# Patient Record
Sex: FEMALE | Race: WHITE | NOT HISPANIC OR LATINO | Employment: OTHER | URBAN - METROPOLITAN AREA
[De-identification: names, ages, dates, MRNs, and addresses within clinical notes are randomized per-mention and may not be internally consistent; named-entity substitution may affect disease eponyms.]

---

## 2021-04-26 ENCOUNTER — MEDICAL CORRESPONDENCE (OUTPATIENT)
Dept: HEALTH INFORMATION MANAGEMENT | Facility: CLINIC | Age: 71
End: 2021-04-26

## 2021-04-26 ENCOUNTER — TRANSFERRED RECORDS (OUTPATIENT)
Dept: HEALTH INFORMATION MANAGEMENT | Facility: CLINIC | Age: 71
End: 2021-04-26

## 2021-04-28 ENCOUNTER — TRANSCRIBE ORDERS (OUTPATIENT)
Dept: OTHER | Age: 71
End: 2021-04-28

## 2021-04-28 DIAGNOSIS — M62.81 GENERALIZED MUSCLE WEAKNESS: Primary | ICD-10-CM

## 2021-04-28 DIAGNOSIS — M33.20 POLYMYOSITIS (H): ICD-10-CM

## 2021-04-30 NOTE — TELEPHONE ENCOUNTER
RECORDS RECEIVED FROM: External   Date of Appt: 7/15/21   NOTES (FOR ALL VISITS) STATUS DETAILS   OFFICE NOTE from referring provider Care Everywhere Dr Rama Bradley @ [facility]:  4/27/21   OFFICE NOTE from other specialist Care Everywhere Dr Mackenzie De La Cruz @ [facility] Neurology:  3/10/21   DISCHARGE SUMMARY from hospital N/A    DISCHARGE REPORT from the ER N/A    OPERATIVE REPORT N/A    MEDICATION LIST Care Everywhere    IMAGING  (FOR ALL VISITS)     EMG Care Everywhere [facility]:  3/30/21   EEG N/A    LUMBAR PUNCTURE N/A    ALETHEA SCAN N/A    ULTRASOUND (CAROTID BILAT) *VASCULAR* N/A    MRI (HEAD, NECK, SPINE) Received [facility]:  MRI Thight Right 4/22/21     CT (HEAD, NECK, SPINE) N/A       Action 4/30/21 MV 1.42pm   Action Taken Imaging request faxed to [facility] for:  MRI Thigh R 4/22/21     Action 5/3/21 MV 6.39am   Action Taken Images received from [facility] and resolved in PACS

## 2021-05-03 ENCOUNTER — OFFICE VISIT (OUTPATIENT)
Dept: NEUROLOGY | Facility: CLINIC | Age: 71
End: 2021-05-03
Attending: INTERNAL MEDICINE
Payer: COMMERCIAL

## 2021-05-03 ENCOUNTER — OFFICE VISIT (OUTPATIENT)
Dept: NEUROLOGY | Facility: CLINIC | Age: 71
End: 2021-05-03
Payer: COMMERCIAL

## 2021-05-03 VITALS
DIASTOLIC BLOOD PRESSURE: 88 MMHG | OXYGEN SATURATION: 95 % | SYSTOLIC BLOOD PRESSURE: 132 MMHG | HEART RATE: 107 BPM | RESPIRATION RATE: 16 BRPM

## 2021-05-03 DIAGNOSIS — M33.20 POLYMYOSITIS (H): ICD-10-CM

## 2021-05-03 DIAGNOSIS — M33.20 POLYMYOSITIS (H): Primary | ICD-10-CM

## 2021-05-03 DIAGNOSIS — R74.8 HYPERCKEMIA: ICD-10-CM

## 2021-05-03 DIAGNOSIS — J96.10 CHRONIC RESPIRATORY FAILURE, UNSPECIFIED WHETHER WITH HYPOXIA OR HYPERCAPNIA (H): ICD-10-CM

## 2021-05-03 DIAGNOSIS — K29.70 GASTRITIS WITHOUT BLEEDING, UNSPECIFIED CHRONICITY, UNSPECIFIED GASTRITIS TYPE: ICD-10-CM

## 2021-05-03 DIAGNOSIS — Z29.89 NEED FOR PNEUMOCYSTIS PROPHYLAXIS: ICD-10-CM

## 2021-05-03 LAB
MISCELLANEOUS TEST: NORMAL
MISCELLANEOUS TEST: NORMAL

## 2021-05-03 PROCEDURE — 95887 MUSC TST DONE W/N TST NONEXT: CPT | Performed by: PSYCHIATRY & NEUROLOGY

## 2021-05-03 PROCEDURE — 83516 IMMUNOASSAY NONANTIBODY: CPT | Mod: 90 | Performed by: PATHOLOGY

## 2021-05-03 PROCEDURE — 95885 MUSC TST DONE W/NERV TST LIM: CPT | Mod: 59 | Performed by: PSYCHIATRY & NEUROLOGY

## 2021-05-03 PROCEDURE — 82657 ENZYME CELL ACTIVITY: CPT | Mod: 90 | Performed by: PATHOLOGY

## 2021-05-03 PROCEDURE — 86235 NUCLEAR ANTIGEN ANTIBODY: CPT | Mod: 90 | Performed by: PATHOLOGY

## 2021-05-03 PROCEDURE — 95886 MUSC TEST DONE W/N TEST COMP: CPT | Performed by: PSYCHIATRY & NEUROLOGY

## 2021-05-03 PROCEDURE — 36415 COLL VENOUS BLD VENIPUNCTURE: CPT | Performed by: PATHOLOGY

## 2021-05-03 PROCEDURE — 99204 OFFICE O/P NEW MOD 45 MIN: CPT | Mod: 25 | Performed by: PSYCHIATRY & NEUROLOGY

## 2021-05-03 PROCEDURE — 95910 NRV CNDJ TEST 7-8 STUDIES: CPT | Performed by: PSYCHIATRY & NEUROLOGY

## 2021-05-03 RX ORDER — CYCLOBENZAPRINE HCL 10 MG
5 TABLET ORAL
COMMUNITY
Start: 2020-07-13 | End: 2023-09-22

## 2021-05-03 RX ORDER — PRIMIDONE 50 MG/1
50 TABLET ORAL 2 TIMES DAILY
COMMUNITY
Start: 2021-04-05 | End: 2024-04-01

## 2021-05-03 RX ORDER — ALBUTEROL SULFATE 0.83 MG/ML
SOLUTION RESPIRATORY (INHALATION)
COMMUNITY
Start: 2020-05-28 | End: 2023-09-22

## 2021-05-03 RX ORDER — RIVAROXABAN 20 MG/1
TABLET, FILM COATED ORAL
COMMUNITY
Start: 2021-01-26 | End: 2023-09-22

## 2021-05-03 RX ORDER — FLUTICASONE PROPIONATE 50 MCG
1 SPRAY, SUSPENSION (ML) NASAL DAILY PRN
COMMUNITY
Start: 2020-01-10

## 2021-05-03 RX ORDER — ATENOLOL 50 MG/1
50 TABLET ORAL DAILY
COMMUNITY
Start: 2020-06-10 | End: 2023-09-22

## 2021-05-03 RX ORDER — ASPIRIN 81 MG/1
81 TABLET, CHEWABLE ORAL EVERY EVENING
COMMUNITY

## 2021-05-03 RX ORDER — PROPRANOLOL HCL 60 MG
60 CAPSULE, EXTENDED RELEASE 24HR ORAL
COMMUNITY
Start: 2020-09-18 | End: 2023-09-22

## 2021-05-03 RX ORDER — LORATADINE 10 MG/1
10 TABLET ORAL DAILY
COMMUNITY

## 2021-05-03 RX ORDER — PREDNISONE 20 MG/1
TABLET ORAL
COMMUNITY
Start: 2020-11-03 | End: 2023-09-22

## 2021-05-03 RX ORDER — IBUPROFEN 600 MG/1
600 TABLET, FILM COATED ORAL EVERY 8 HOURS PRN
COMMUNITY
Start: 2021-01-21

## 2021-05-03 RX ORDER — ATORVASTATIN CALCIUM 20 MG/1
20 TABLET, FILM COATED ORAL DAILY
COMMUNITY
Start: 2021-02-15 | End: 2023-09-22

## 2021-05-03 RX ORDER — AMLODIPINE BESYLATE 10 MG/1
10 TABLET ORAL DAILY
COMMUNITY
Start: 2021-04-05

## 2021-05-03 RX ORDER — IPRATROPIUM BROMIDE AND ALBUTEROL SULFATE 2.5; .5 MG/3ML; MG/3ML
3 SOLUTION RESPIRATORY (INHALATION) EVERY 4 HOURS PRN
COMMUNITY
Start: 2020-09-12

## 2021-05-03 RX ORDER — FLUOCINONIDE TOPICAL SOLUTION USP, 0.05% 0.5 MG/ML
SOLUTION TOPICAL DAILY PRN
COMMUNITY
Start: 2020-12-14

## 2021-05-03 NOTE — PROGRESS NOTES
[facility]  Electrodiagnostic Laboratory    Nerve Conduction & EMG Report          Patient:       Jenna Jhaveri  Patient ID:    [id number]  Gender:        Female  YOB: 1950  Age:           71 Years 3 Months        History & Examination:  71 year old woman with weakness in upper and lower limbs, progressive since fall . Evaluate for myopathy.     Techniques: Motor and sensory conduction studies were done with surface recording electrodes. EMG was done with a concentric needle electrode.      Results:  Nerve conduction studies:  1. Left median-D2, ulnar-D5, sural and SP sensory responses are normal.   2. Left peroneal-EDB and tibial-AH motor responses show normal DL and mildly reduced amplitudes.   3. Left median-APB and ulnar-ADM motor responses are normal.     Needle EM. Fibrillation potentials and positive sharp waves were seen in the left deltoid, triceps, biceps, PT, VL, TA, gastrocnemius, and thoracic paraspinal muscles. Pseudomyotonic discharges were also seen in the left PT muscle.   2. Motor unit potentials with short amplitudes and increased polyphasia were seen in the left biceps muscle.   3. Motor unit potentials with a mixture of normal and short amplitudes with increased polyphasia were seen in the left deltoid and triceps muscles.  4. Motor unit potentials with increased polyphasia were seen in the left PT, VL, and TA muscles.  5. Early recruitment was seen in the left deltoid, biceps, triceps, and PT muscles.     Interpretation:  This is an abnormal study. 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 Clinical correlation is recommended.       Deven Beckford MD  Department of Neurology        Sensory NCS      Nerve / Sites Rec. Site Onset Peak NP Amp Ref. PP Amp Dist Boo Ref. Temp     ms ms  V  V  V cm m/s m/s  C   L MEDIAN - Dig II Anti      Wrist Dig II 2.76 3.75 14.2 10.0 28.0  14 50.7 48.0 32.8   L ULNAR - Dig V Anti      Wrist Dig V 1.93 2.55 29.2 8.0 45.3 12.5 64.9 48.0 32.8   L SURAL - Lat Mall 60      Calf Ankle 2.29 2.97 6.1 5.0 5.8 14 61.1 38.0    L SUP PERONEAL      Lat Leg Sanches 2.76 3.65 11.3  5.8 12.5 45.3 38.0        Motor NCS      Nerve / Sites Rec. Site Lat Ref. Amp Ref. Rel Amp Dist Boo Ref. Dur. Area Temp.     ms ms mV mV % cm m/s m/s ms %  C   L MEDIAN - APB      Wrist APB 4.01 4.40 5.9 5.0 100 8   6.61 100 32.8      Elbow APB 7.86  5.1  87.2 20 51.9 48.0 6.82 87.9 32.8   L ULNAR - ADM      Wrist ADM 2.40 3.50 8.7 5.0 100 8   6.20 100 32.8      B.Elbow ADM 5.47  8.3  95 20 65.1 48.0 5.78 97.2 32.8      A.Elbow ADM 7.19  7.7  88.4 11 64.0 48.0 6.46 90 32.8   L DEEP PERONEAL - EDB 60      Ankle EDB 3.96 6.00 1.5 2.0 100 8   7.86 100 32.7      FibHead EDB 10.16  1.7  114 31 50.0 38.0 8.23 97.8 32.7      Pop Fos EDB 11.82  1.7  117 10 60.0 38.0 8.02 96.3 32.7   L TIBIAL - AH      Ankle AH 3.54 6.00 3.3 4.0 100 8   5.63 100        F  Wave      Nerve Min F Lat Max F Lat Mean FLat Temp.    ms ms ms  C   L TIBIAL 53.91 55.16 54.53 23.6       EMG Summary Table     Spontaneous Recruitment MUAP    IA Fib PSW Fasc H.F. Pattern Amp Dur. PPP   L. DELTOID Inc 3+ 3+ None None Early Mix N Inc   L. BICEPS Inc 3+ 3+ None None Early Small N Inc   L. TRICEPS Inc 3+ 3+ None None Early Mix N Inc   L. PRON TERES Inc 3+ None None Pseudomyotonic Early N N Inc   L. FIRST D INTEROSS N None None None None N N N N   L. VAST LATERALIS Inc 2+ 2+ None None N N N Inc   L. TIB ANTERIOR Inc 2+ 2+ None None N N N Inc   L. GASTROCN (MED) Inc 2+ 2+ None None N N N N   L. THOR PSP (M) Inc 2+ 2+ None None

## 2021-05-03 NOTE — PROGRESS NOTES
Service Date: 2021    Arsalan Blas MD  [facility]   [address]   [facility]  [id number]      RE:      Jenna Jhaveri  MRN:  [id number]  :   1950    Dear Doctors:      I had the pleasure to evaluate Ms. Jhaveri at the [facility] today.  She is a 71-year-old woman with progressive muscle weakness.  The story is as follows:  She notes as early as the spring of 2020 symptoms of pain and numbness in the buttocks, left more than right.  The pain and numbness were traveling from the left posterior hip and buttock all the way down to the knee.  She had similar but milder symptoms on the right buttock and thigh a few months later, sometimes radiating down to the calf.  Pain persisted despite taking naproxen.  She noticed slowing of her gait and difficulty walking stairs in the next few months.  Her weakness worsened significantly after 2020.  At that time, she was diagnosed with chronic pulmonary embolism causing her respiratory problems.  She was put on oxygen.  She was also using CPAP at night for at least 8-10 years for obstructive sleep apnea.  During the last week of 10/2020, she stepped in her 's truck and had no difficulty doing this but a week later, she went into her son's truck and could not flex her right hip to get in the truck. The right leg felt heavy and very weak and was giving out.  The weakness gradually worsened in the next 6 months. She now needs a walker to  Walk around. Her right leg is more affected than the left.  She also noticed trouble raising her arms overhead, grasping something from a shelf, indicating shoulder muscle weakness.  She does not have any new-onset hand weakness or clumsiness, although she has chronic essential tremor for which she used to be treated with primidone in the past.  Primidone was switched to propranolol in 2020 after the diagnosis of pulmonary embolism because it was felt that primidone would  "interact with the anticoagulation she was receiving, namely Xarelto.  She stopped the Xarelto after 6 months of treatment in 03/2021 and now, she is switched back to primidone.  Her hand tremor has not changed recently.   She denies dysphagia, dysarthria, sialorrhea, difficulty chewing, ptosis or double vision.  She does have dyspnea on exertion that was more notable since 09/2020 and at that time, she was started on oxygen.  She had pulmonary function tests last year indicating quite severe restriction with FVC of 48% and FEV1 of 45%. MIP was not measured.  Of note, she had a CT scan of the chest a few weeks ago that did not show any significant interstitial lung disease or infiltrates to explain those restrictive pulmonary function test abnormalities.  She also had an MRI of the shoulder on 04/22/2021 showing moderate volume loss with minor fatty infiltration throughout the deltoid. It was \"negative for myositis\".  There was minor insertional tendinosis, grade 1 fat infiltration of the rotator cuff musculature. Right thigh MRI done on the same day, without contrast, showed regions of T2 hyperintensity throughout the pelvic and thigh musculature bilaterally, more prominently involving the hip adductors, rather symmetric.  Myositis was not entirely excluded but \"felt to be less likely\".  There was also fatty infiltration of the pelvic musculature.     She had an EMG at [facility] a month ago 03/30/2021.  Interestingly, that EMG showed no evidence of myopathy.  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 Importantly, the patient was taking Lipitor until 3/2021; she stopped it then, but has not seen any improvement of her strength since.   She is also on phentermine, amlodipine, Advair Diskus, aspirin, loratadine and now back on primidone.     FAMILY HISTORY:  She has no " family history of muscle disease.  She has a cousin with multiple sclerosis.     SOCIAL HISTORY:  She does not smoke.  She drinks alcohol rarely, socially.  No illicit drug use reported.    PHYSICAL EXAMINATION:   /88   Pulse 107   Resp 16   SpO2 95%    NEURO: She is awake, alert, oriented x3.  She is able to provide a coherent history. She has no ptosis or ophthalmoparesis. Ductions and versions of the eyes are normal.  There is no weakness of orbicularis oculi or oris, uvula, jaw, palate or tongue.  There is no dysphonia or dysarthria. Neck flexion, however, is weak clearly at 4/5 and extension is also a little weak to the same extent.  She has 4-/5 strength for bilateral deltoids and biceps. Triceps is right 4, left 3- to 4-.  Wrist extensors, finger extensors, APB and hand  are bilaterally 5.  FDI is right 4, left 5, likely due to ulnar neuropathy.  Her hip flexion is very weak on the right, barely 2.  On the left, it is 3.  Knee extension/knee flexion bilaterally 5.  Foot dorsiflexion is asymmetric; 4 on the right, 5 on the left.  Great toe extension is similar.  Tone is normal.  Reflexes are absent at the ankles, 1+ at the knees, 2+ at brachioradialis, 2+ at the right triceps, 0 at the left and 0 at bilateral biceps.  Toes are neutral or downgoing bilaterally.  A sensory exam shows intact vibration at the left great toe, almost 9-10 seconds but a bit reduced at the right great toe at 5.  Vibration at the index fingers is normal.  Pinprick and light touch in the upper extremities are normal.  Pinprick is reduced on the right dorsum of the foot and great toe compared to the left.  Tone is normal.  She has a moderate postural and kinetic tremor of the hands.  I did not assess her gait.    LABORATORY DATA:  CK was markedly elevated at 2000.  Aldolase elevated at 35.  Sed rate and CRP were interestingly normal.  CELSO antibodies negative.  Liver function tests showed elevated ALT at 156.  AST was also  elevated but to a lesser extent at 76.  Hemoglobin A1c was 5.8%.  Lipid profile normal, except for mildly elevated triglycerides at 203.    We repeated an EMG today at the [facility]. It was markedly abnormal showing an irritable myopathy.    SUMMARY:  In summery, Ms. Jhaveri clearly has a progressive myopathy over the last 8-9 months. Repeat EMG today at the [facility] confirmed this and showed abundant spontaneous activity in multiple muscles. 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  We will get a polymyositis and dermatomyositis antibody panel and importantly, HMG-CoA Reductase, IgG antibodies.  The latter has been associated with statin exposure and could signify the presence of an autoimmune necrotizing myopathy that requires immunotherapy, not just stopping the statin.  It is very important to review those antibody results because they may allow us a definite diagnosis without having to do a muscle biopsy.    I also noted that her weakness is a little bit asymmetric and much worse on the right leg compared to the left which is a little atypical for polymyositis or necrotizing myopathy.  I wonder whether she has superimposed lumbar radiculopathies, especially at L5. This was not seen on EMG today but we will reassess in the future.     Of note, the patient had severely restrictive pulmonary function tests last year, without any striking findings on chest CT.  This behooves me to consider diaphragmatic weakness related to the muscle disease causing this problem.  This could be a result from polymyositis but another differential diagnosis is adult-onset Pompe disease, so I am going to check whole blood alpha glucosidase levels today.      I will let the patient know regarding the test results as soon as they are back, because if they confirm the presence of an autoimmune muscle disease, she will need treatment ASAP with multiple medications including steroids, IVIG and a steroid-sparing immunosuppressant drug like  methotrexate.      I am also little troubled by the development of pulmonary embolism without risk factors in an ambulatory person. This raises concerns about underlying malignancy.  In this situation, an abdominal/pelvic CT, mammogram and other age-appropriate cancer workup should be pursued.     Thank you for allowing me to participate in the care of Ms. Jhaveri. I will contact her once we have a definitive diagnosis to discuss treatment plan. TT spent on this encounter today 55 minutes- 30 face to face with patient. 10 in post-visit note dictation and editing, 15 in pre-visit chart review.     Sincerely,     cc:  MD Keron Dillon MD    ADDENDUM (5/11/2021): HMG CoA reductase IgG antibodies came back markedly positive (>200). 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 Polymyositis antibody panel pending. Patient contacted on 5/10/2021 by phone. Those results are diagnostic and muscle biopsy will not be necessary. Diagnosis is autoimmune necrotizing myopathy with HMGCR antibodies. This could be related to statin use which was stopped and should never be restarted. She will require aggressive immunotherapy. Many experts now recommend IVIG as first-line therapy for this disorder. I will prescribe IVIG 0.4 g/kg daily x5 and repeat the cycle every 28 days, x3. I will also start pt on high dose prednisone 60 mg daily, with plan to taper by 10 mg every 2 weeks. Will also prescribe Bactrim for PCP prophylaxis, PUD prophylaxis with omeprazole, and vitamin D. Down the way we will also start a steroid-sparing immunosuppressant drug like methotrexate or azathioprine. Side effects and precautions for IVIG and corticosteroids were extensively discussed with the patient and sent in a [employer] message. She should be reassessed in 1-2 months either virtually or in Clinic. She is in agreement and verbalized understanding to above recommendations and plan.         D: 05/03/2021    T: 2021   MT: [email or web address]    Name:     OSMANI SOLANO  MRN:      [id number]        Account:      [id number]   :      1950           Service Date: 2021       Document: [id number]

## 2021-05-03 NOTE — LETTER
5/3/2021       RE: Jenna Jhaveri  [address] [place] [id number]     Dear Colleague,    Thank you for referring your patient, Jenna Jhaveri, to the [facility] EMG CLINIC [place] at [facility]. Please see a copy of my visit note below.        [facility]  Electrodiagnostic Laboratory    Nerve Conduction & EMG Report          Patient:       Jenna Jhaveri  Patient ID:    [id number]  Gender:        Female  YOB: 1950  Age:           71 Years 3 Months        History & Examination:  71 year old woman with weakness in upper and lower limbs, progressive since fall . Evaluate for myopathy.     Techniques: Motor and sensory conduction studies were done with surface recording electrodes. EMG was done with a concentric needle electrode.      Results:  Nerve conduction studies:  1. Left median-D2, ulnar-D5, sural and SP sensory responses are normal.   2. Left peroneal-EDB and tibial-AH motor responses show normal DL and mildly reduced amplitudes.   3. Left median-APB and ulnar-ADM motor responses are normal.     Needle EM. Fibrillation potentials and positive sharp waves were seen in the left deltoid, triceps, biceps, PT, VL, TA, gastrocnemius, and thoracic paraspinal muscles. Pseudomyotonic discharges were also seen in the left PT muscle.   2. Motor unit potentials with short amplitudes and increased polyphasia were seen in the left biceps muscle.   3. Motor unit potentials with a mixture of normal and short amplitudes with increased polyphasia were seen in the left deltoid and triceps muscles.  4. Motor unit potentials with increased polyphasia were seen in the left PT, VL, and TA muscles.  5. Early recruitment was seen in the left deltoid, biceps, triceps, and PT muscles.     Interpretation:  This is an abnormal study. There is electrophysiologic evidence suggestive of a generalized irritable myopathy with widespread  muscle membrane instability. Clinical correlation is recommended.       Deven Beckford MD  Department of Neurology        Sensory NCS      Nerve / Sites Rec. Site Onset Peak NP Amp Ref. PP Amp Dist Boo Ref. Temp     ms ms  V  V  V cm m/s m/s  C   L MEDIAN - Dig II Anti      Wrist Dig II 2.76 3.75 14.2 10.0 28.0 14 50.7 48.0 32.8   L ULNAR - Dig V Anti      Wrist Dig V 1.93 2.55 29.2 8.0 45.3 12.5 64.9 48.0 32.8   L SURAL - Lat Mall 60      Calf Ankle 2.29 2.97 6.1 5.0 5.8 14 61.1 38.0    L SUP PERONEAL      Lat Leg Sanches 2.76 3.65 11.3  5.8 12.5 45.3 38.0        Motor NCS      Nerve / Sites Rec. Site Lat Ref. Amp Ref. Rel Amp Dist Boo Ref. Dur. Area Temp.     ms ms mV mV % cm m/s m/s ms %  C   L MEDIAN - APB      Wrist APB 4.01 4.40 5.9 5.0 100 8   6.61 100 32.8      Elbow APB 7.86  5.1  87.2 20 51.9 48.0 6.82 87.9 32.8   L ULNAR - ADM      Wrist ADM 2.40 3.50 8.7 5.0 100 8   6.20 100 32.8      B.Elbow ADM 5.47  8.3  95 20 65.1 48.0 5.78 97.2 32.8      A.Elbow ADM 7.19  7.7  88.4 11 64.0 48.0 6.46 90 32.8   L DEEP PERONEAL - EDB 60      Ankle EDB 3.96 6.00 1.5 2.0 100 8   7.86 100 32.7      FibHead EDB 10.16  1.7  114 31 50.0 38.0 8.23 97.8 32.7      Pop Fos EDB 11.82  1.7  117 10 60.0 38.0 8.02 96.3 32.7   L TIBIAL - AH      Ankle AH 3.54 6.00 3.3 4.0 100 8   5.63 100        F  Wave      Nerve Min F Lat Max F Lat Mean FLat Temp.    ms ms ms  C   L TIBIAL 53.91 55.16 54.53 23.6       EMG Summary Table     Spontaneous Recruitment MUAP    IA Fib PSW Fasc H.F. Pattern Amp Dur. PPP   L. DELTOID Inc 3+ 3+ None None Early Mix N Inc   L. BICEPS Inc 3+ 3+ None None Early Small N Inc   L. TRICEPS Inc 3+ 3+ None None Early Mix N Inc   L. PRON TERES Inc 3+ None None Pseudomyotonic Early N N Inc   L. FIRST D INTEROSS N None None None None N N N N   L. VAST LATERALIS Inc 2+ 2+ None None N N N Inc   L. TIB ANTERIOR Inc 2+ 2+ None None N N N Inc   L. GASTROCN (MED) Inc 2+ 2+ None None N N N N   L. THOR PSP (M) Inc 2+ 2+ None None                               Again, thank you for allowing me to participate in the care of your patient.      Sincerely,    Deven Beckford MD

## 2021-05-07 LAB
RESULT: ABNORMAL
RESULT: NORMAL
SEND OUTS MISC TEST CODE: ABNORMAL
SEND OUTS MISC TEST CODE: NORMAL
SEND OUTS MISC TEST SPECIMEN: ABNORMAL
SEND OUTS MISC TEST SPECIMEN: NORMAL
TEST NAME: ABNORMAL
TEST NAME: NORMAL

## 2021-05-10 ENCOUNTER — TELEPHONE (OUTPATIENT)
Dept: NEUROLOGY | Facility: CLINIC | Age: 71
End: 2021-05-10

## 2021-05-10 PROBLEM — M33.20 POLYMYOSITIS (H): Status: ACTIVE | Noted: 2021-05-10

## 2021-05-10 RX ORDER — HEPARIN SODIUM (PORCINE) LOCK FLUSH IV SOLN 100 UNIT/ML 100 UNIT/ML
5 SOLUTION INTRAVENOUS
Status: CANCELLED | OUTPATIENT
Start: 2021-05-17

## 2021-05-10 RX ORDER — ACETAMINOPHEN 325 MG/1
650 TABLET ORAL ONCE
Status: CANCELLED
Start: 2021-05-17

## 2021-05-10 RX ORDER — PREDNISONE 20 MG/1
TABLET ORAL
Qty: 90 TABLET | Refills: 2 | Status: SHIPPED | OUTPATIENT
Start: 2021-05-10 | End: 2021-08-30

## 2021-05-10 RX ORDER — DIPHENHYDRAMINE HCL 25 MG
50 CAPSULE ORAL ONCE
Status: CANCELLED | OUTPATIENT
Start: 2021-05-17

## 2021-05-10 RX ORDER — HEPARIN SODIUM,PORCINE 10 UNIT/ML
5 VIAL (ML) INTRAVENOUS
Status: CANCELLED | OUTPATIENT
Start: 2021-05-17

## 2021-05-10 RX ORDER — SULFAMETHOXAZOLE/TRIMETHOPRIM 800-160 MG
1 TABLET ORAL
Qty: 36 TABLET | Refills: 0 | Status: SHIPPED | OUTPATIENT
Start: 2021-05-11 | End: 2021-07-19

## 2021-05-10 NOTE — TELEPHONE ENCOUNTER
Per Dr. Valencia, patient to infuse IVIG as soon as possible.  Therapy plan and demographics faxed to [facility] (phone [phone], fax [phone]).  [place] called and updated on the plan and will follow up once scheduled for her infusions.    Fany Vincent RN

## 2021-05-13 NOTE — TELEPHONE ENCOUNTER
Called [facility] to get an update on patient's infusions.  Center had not received therapy plan and gave a new number to fax it to.  Plan faxed to [phone] as an urgent request.    Fany Vincent RN

## 2021-05-15 ENCOUNTER — HEALTH MAINTENANCE LETTER (OUTPATIENT)
Age: 71
End: 2021-05-15

## 2021-05-17 NOTE — TELEPHONE ENCOUNTER
[facility] Call Center    Phone Message    May a detailed message be left on voicemail: yes     Reason for Call: Other: Patria at [facility] returned missed call fron LEONARDA Soares.      Please call Patria back.    Action Taken: Message routed to:  [facility] ([facility] Neurology    Travel Screening: Not Applicable

## 2021-05-20 LAB
ANNOTATION COMMENT IMP: NORMAL
EJ AB SER QL: NEGATIVE
ENA JO1 AB TITR SER: 0 AU/ML (ref 0–40)
MDA5 (CADM 140) ABY: NEGATIVE
MI2 AB SER QL: NEGATIVE
NXP-2 (NUCLEAR MATRIX PROTEIN 2) ABY: NEGATIVE
OJ AB SER QL: NEGATIVE
P155/140 (TIF1-GAMMA) ANTIBODY: NEGATIVE
PL12 AB SER QL: NEGATIVE
PL7 AB SER QL: NEGATIVE
SAE1 (SUMO ACTIVATING ENZYME) ABY: NEGATIVE
SRP AB SERPL QL: NEGATIVE
TIF-1 GAMMA ANTIBODY: NEGATIVE

## 2021-07-15 ENCOUNTER — PRE VISIT (OUTPATIENT)
Dept: NEUROLOGY | Facility: CLINIC | Age: 71
End: 2021-07-15

## 2021-07-19 DIAGNOSIS — Z29.89 NEED FOR PNEUMOCYSTIS PROPHYLAXIS: ICD-10-CM

## 2021-07-19 RX ORDER — SULFAMETHOXAZOLE/TRIMETHOPRIM 800-160 MG
1 TABLET ORAL
Qty: 36 TABLET | Refills: 0 | Status: SHIPPED | OUTPATIENT
Start: 2021-07-20 | End: 2023-05-17

## 2021-07-27 DIAGNOSIS — Z29.89 NEED FOR PNEUMOCYSTIS PROPHYLAXIS: ICD-10-CM

## 2021-07-27 DIAGNOSIS — K29.70 GASTRITIS WITHOUT BLEEDING, UNSPECIFIED CHRONICITY, UNSPECIFIED GASTRITIS TYPE: ICD-10-CM

## 2021-07-27 RX ORDER — SULFAMETHOXAZOLE/TRIMETHOPRIM 800-160 MG
1 TABLET ORAL
Qty: 36 TABLET | Refills: 0 | OUTPATIENT
Start: 2021-07-27

## 2021-08-25 ASSESSMENT — ENCOUNTER SYMPTOMS
NAUSEA: 0
WEIGHT LOSS: 0
MEMORY LOSS: 0
BLOATING: 0
HALLUCINATIONS: 0
WEIGHT GAIN: 0
BACK PAIN: 1
INCREASED ENERGY: 1
JOINT SWELLING: 0
BLOOD IN STOOL: 0
BOWEL INCONTINENCE: 0
VOMITING: 0
HEARTBURN: 1
DIFFICULTY URINATING: 0
RECTAL PAIN: 0
WEAKNESS: 1
CHILLS: 0
POLYPHAGIA: 0
FATIGUE: 1
FEVER: 0
SPEECH CHANGE: 0
TINGLING: 1
ARTHRALGIAS: 0
ABDOMINAL PAIN: 0
TREMORS: 1
DIARRHEA: 1
NUMBNESS: 1
NIGHT SWEATS: 0
DISTURBANCES IN COORDINATION: 0
DIZZINESS: 1
HEMATURIA: 0
ALTERED TEMPERATURE REGULATION: 0
SEIZURES: 0
DYSURIA: 0
HEADACHES: 1
CONSTIPATION: 0
PARALYSIS: 0
POLYDIPSIA: 0
DECREASED APPETITE: 0
JAUNDICE: 0
NECK PAIN: 0
FLANK PAIN: 0
LOSS OF CONSCIOUSNESS: 0
MYALGIAS: 1

## 2021-08-26 ENCOUNTER — LAB (OUTPATIENT)
Dept: LAB | Facility: CLINIC | Age: 71
End: 2021-08-26
Payer: COMMERCIAL

## 2021-08-26 ENCOUNTER — OFFICE VISIT (OUTPATIENT)
Dept: NEUROLOGY | Facility: CLINIC | Age: 71
End: 2021-08-26
Payer: COMMERCIAL

## 2021-08-26 VITALS
OXYGEN SATURATION: 98 % | SYSTOLIC BLOOD PRESSURE: 150 MMHG | RESPIRATION RATE: 16 BRPM | HEART RATE: 81 BPM | DIASTOLIC BLOOD PRESSURE: 88 MMHG

## 2021-08-26 DIAGNOSIS — M81.8 STEROID-INDUCED OSTEOPOROSIS: ICD-10-CM

## 2021-08-26 DIAGNOSIS — G72.49 AUTOIMMUNE NECROTIZING MYOPATHY: ICD-10-CM

## 2021-08-26 DIAGNOSIS — G72.49 AUTOIMMUNE NECROTIZING MYOPATHY: Primary | ICD-10-CM

## 2021-08-26 DIAGNOSIS — Z79.60 LONG-TERM USE OF IMMUNOSUPPRESSANT MEDICATION: ICD-10-CM

## 2021-08-26 DIAGNOSIS — T38.0X5A STEROID-INDUCED OSTEOPOROSIS: ICD-10-CM

## 2021-08-26 LAB
ALT SERPL W P-5'-P-CCNC: 43 U/L (ref 0–50)
AST SERPL W P-5'-P-CCNC: 26 U/L (ref 0–45)
BASOPHILS # BLD AUTO: 0 10E3/UL (ref 0–0.2)
BASOPHILS NFR BLD AUTO: 0 %
CK SERPL-CCNC: 27 U/L (ref 30–225)
EOSINOPHIL # BLD AUTO: 0 10E3/UL (ref 0–0.7)
EOSINOPHIL NFR BLD AUTO: 0 %
ERYTHROCYTE [DISTWIDTH] IN BLOOD BY AUTOMATED COUNT: 15 % (ref 10–15)
HCT VFR BLD AUTO: 45 % (ref 35–47)
HGB BLD-MCNC: 14.3 G/DL (ref 11.7–15.7)
IMM GRANULOCYTES # BLD: 0 10E3/UL
IMM GRANULOCYTES NFR BLD: 0 %
LYMPHOCYTES # BLD AUTO: 1.5 10E3/UL (ref 0.8–5.3)
LYMPHOCYTES NFR BLD AUTO: 21 %
MCH RBC QN AUTO: 29.9 PG (ref 26.5–33)
MCHC RBC AUTO-ENTMCNC: 31.8 G/DL (ref 31.5–36.5)
MCV RBC AUTO: 94 FL (ref 78–100)
MONOCYTES # BLD AUTO: 0.2 10E3/UL (ref 0–1.3)
MONOCYTES NFR BLD AUTO: 3 %
NEUTROPHILS # BLD AUTO: 5.5 10E3/UL (ref 1.6–8.3)
NEUTROPHILS NFR BLD AUTO: 76 %
NRBC # BLD AUTO: 0 10E3/UL
NRBC BLD AUTO-RTO: 0 /100
PLATELET # BLD AUTO: 288 10E3/UL (ref 150–450)
RBC # BLD AUTO: 4.78 10E6/UL (ref 3.8–5.2)
WBC # BLD AUTO: 7.3 10E3/UL (ref 4–11)

## 2021-08-26 PROCEDURE — 99215 OFFICE O/P EST HI 40 MIN: CPT | Performed by: PSYCHIATRY & NEUROLOGY

## 2021-08-26 PROCEDURE — 85025 COMPLETE CBC W/AUTO DIFF WBC: CPT | Performed by: PATHOLOGY

## 2021-08-26 PROCEDURE — 94375 RESPIRATORY FLOW VOLUME LOOP: CPT | Performed by: INTERNAL MEDICINE

## 2021-08-26 PROCEDURE — 82550 ASSAY OF CK (CPK): CPT | Performed by: PATHOLOGY

## 2021-08-26 PROCEDURE — 84460 ALANINE AMINO (ALT) (SGPT): CPT | Performed by: PATHOLOGY

## 2021-08-26 PROCEDURE — 36415 COLL VENOUS BLD VENIPUNCTURE: CPT | Performed by: PATHOLOGY

## 2021-08-26 PROCEDURE — 84450 TRANSFERASE (AST) (SGOT): CPT | Performed by: PATHOLOGY

## 2021-08-26 RX ORDER — AZATHIOPRINE 50 MG/1
TABLET ORAL
Qty: 120 TABLET | Refills: 1 | Status: SHIPPED | OUTPATIENT
Start: 2021-08-26 | End: 2021-12-20

## 2021-08-26 RX ORDER — BACLOFEN 10 MG/1
TABLET ORAL
COMMUNITY
Start: 2021-08-05 | End: 2023-09-22

## 2021-08-26 RX ORDER — DULOXETINE 40 MG/1
CAPSULE, DELAYED RELEASE ORAL
COMMUNITY
Start: 2021-08-09 | End: 2023-09-22

## 2021-08-26 RX ORDER — HYDROCODONE BITARTRATE AND ACETAMINOPHEN 10; 325 MG/1; MG/1
TABLET ORAL
COMMUNITY
Start: 2021-08-09 | End: 2023-09-22

## 2021-08-26 RX ORDER — ALENDRONATE SODIUM 35 MG/1
35 TABLET ORAL
Qty: 12 TABLET | Refills: 1 | Status: SHIPPED | OUTPATIENT
Start: 2021-08-26 | End: 2023-09-22

## 2021-08-26 ASSESSMENT — PAIN SCALES - GENERAL: PAINLEVEL: NO PAIN (0)

## 2021-08-26 NOTE — LETTER
2021       RE: Jenna Jhaveri  [address] [facility] [address]     Dear Colleague,    Thank you for referring your patient, Jenna Jhaveri, to the [facility] NEUROLOGY CLINIC [place] at [facility]. Please see a copy of my visit note below.    Service Date: 2021    Arsalan Blas MD  [facility] - Internal Medicine  [address] [place] [address]    YEIMI Alanis, CNP  [facility] - Neurology   [address] [place] [address]    RE:     Jenna Jhaveri  MRN:  [id number]  :  1950    Dear Doctors:      I had the pleasure to see Jenna Jhaveri in followup at the [facility].  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  She had noticed symptoms in the spring of 2020 with pain and numbness in the buttocks, left more than right, but she developed slowing in her gait and difficulty walking stairs in the summer of , and weakness worsened significantly after 2020 when she was diagnosed with chronic pulmonary embolism and she had dyspnea and some orthopnea.  Weakness continued to inexorably deteriorate.  She had an EMG with us in 2021 that showed a marked irritable myopathy, in contrast to an EMG done in [place] in 2021 that was interestingly reported as no myopathic changes.  Her CK was elevated at 2000, and she had marked proximal weakness.  She was unable to get up from a chair without assistance and had trouble raising the arms overhead as well.  She was on statins in the past but stopped it earlier in  without any improvement.  HMG-CoA reductase IgG antibodies in serum were markedly elevated at over 200, polymyositis antibody panel was negative, and GAA enzyme activity in leukocytes was negative, ruling out  Pompe disease.  Once I saw those results, I immediately put her on IVIG 0.4 g/kg daily x5 and we repeated the cycle every 28 days for May, June and July.  I subsequently continued maintenance IVIG at 0.8 g/kg every 2 weeks, and she got her last infusion last week.  I also put her initially on prednisone 60 mg daily with a gradual taper to 0, but then on 07/28 she called me reporting persistent pains in her bilateral lower extremities.  The pain was starting at the back and buttocks and radiating down the legs, and because I was not sure that pain represented a lumbar spine problem or recurrence of her myopathy, I asked her to stay on prednisone 20 mg daily and the IVIG. Ms Mo saw her and increased her Cymbalta dose to 40 mg daily (from 20) and added baclofen in the evening. This helped and the pain is much better now. She is not on any other immunosuppressant yet.    Since the last time I saw her, she also got a mammogram that was normal, a repeat bone density scan that showed a T score of -2.1 on the hip and -2.2 on the lumbar spine, with 27% loss of bone density in the hip compared to 10 years ago and 18% loss at the lumbar spine.  CT chest, abdomen and pelvis showed no evidence of malignancy.  She feels much better now with the treatment, but she is still about 30% back to normal, not fully.  She still has some difficulty getting up from low-seated chairs, and her right leg still does not clear well when walking.  She has not had any falls.  She uses a walker at times.  She now, however, has no difficulty raising arms overhead or grasping heavy objects with her hands.  She does not have any trouble with fine hand movements, except for a tremor that has been there for a while.  The tremor was treated by Ms. Mo recently with extended-release propranolol.  She used to be on primidone until recently, but it was gradually discontinued.  For osteopenia she is on calcium and vitamin D, but she is not on any  antiresorptive treatment.  She is due to have repeat spirometry in 3 weeks.  Her chest CT done in the spring of 2021 did not show any significant interstitial lung disease or infiltrates to explain the restrictive pulmonary function tests.      Medications were reviewed and are as per Epic record.    BP (!) 150/88   Pulse 81   Resp 16   SpO2 98%      PFT Latest Ref Rng & Units 8/26/2021   FVC L 1.42   FEV1 L 1.10   FVC% % 52   FEV1% % 51     PHYSICAL EXAMINATION:  She has no dysphagia or other cranial nerve symptoms.  On exam, she is awake, alert, oriented x3.  Her neck flexion is still 4/5, a bit weak.  Extension is full.  There is no weakness of orbicularis oculi or oris.  There is no dysphonia or dysarthria.  She has marked improvement of her strength compared to the last visit.  Her deltoid strength is now 5 bilaterally and so is biceps.  Triceps is right 5, left 4+.  Wrist extensors, finger extensors are bilaterally 5.  FDI is right 4, left 5.  APB is bilaterally 4 to 4+.  Her hip flexion is now 4/5 with a marked improvement, was previously 2 on the right and 3 on the left.  Knee extension, knee flexion bilaterally 5, and foot dorsiflexion is now 5, previously 4 on the right.  Hip adduction is 5/5 bilaterally.  Hip abduction is right 4, left 5-.  She was able even to get up from a chair with arms crossed on the chest very slowly.    CK today was low (<27). CBC, AST, and ALT were normal.    In summary, Ms. Jhaveri has autoimmune necrotizing myopathy with high positive HMGCR IgG antibody titer.  This could relate to prior statin exposure.  She has improved substantially with immunotherapy, although she is not completely back to normal.  I told her that her recovery may be slow because she was untreated for 9-10 months when we first saw her. 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 Thus, I will taper her prednisone to 10 mg daily for 2  weeks, 10 mg every other day for two weeks, then stop it. I also believe we can reduce the frequency of her IVIG infusions to every 3 weeks for another two months, then every 4 weeks. I will also start her on nonsteroidal immunosuppressant drug like azathioprine.  Side effects of azathioprine were explained in detail.  She will start 50 mg b.i.d. and gradually titrate the dose to a goal of at least 200 mg daily after a month or two.  She will get CBC, AST and ALT checked today as well as at 2, 4, 8 and 12 weeks after starting the drug.  I also reviewed her bone density scan and she has osteoporosis.  Because she is on steroids, I think this needs to be treated more aggressively and I would recommend starting alendronate 35 mg weekly.  Side effects were explained including the fact that she should not recline for at least an hour after taking the drug due to the risk of reflux.  She will return to clinic for followup in 3 months or sooner if necessary. Her restrictive respiratory pattern could relate to diaphragmatic muscle weakness from the myopathy; overall values are stable from last visit.     [place] also asked me about the booster COVID-19 shots, and I told her that she likely qualifies because, based on the latest CDC recommendations, those apply for immunosuppressed patients including those on maintenance prednisone dose of 20 mg a day or more.  She should talk to her primary care doctor's office about scheduling it.     Total time spent on this encounter today 45 minutes including 25 with the patient, 10 on post-visit note dictation, editing and orders, and at least 10 in pre-visit chart review.    Sincerely,    Keron Valencia MD

## 2021-08-26 NOTE — PATIENT INSTRUCTIONS
You are doing much better and this is reassuring  Let's get labs today including CK\  Spirometry (breathing test) today if possible  Depending on your CK values I will contact you and let you know what we will do with the prednisone and IVIG doses  Start a new drug called azathioprine- this is a suppressant of the immune system. It can cause low white blood cell counts and platelets, and increase the risk of infection. Occasionally patients may develop liver injury, or a flu-like illness, or recurrence of shingles while on the drug.  Please let me know immediately if you develop any severe abdominal pain, vomiting, yellow color of eyes, dark red color of urine, skin rash, or high fever, while taking the azathioprine.    Please get blood tests 2,4,8 and 12 weeks after starting the new drug. I will give you a script today for those.  Follow up in 3 months  You will qualify for COVID19 shot now  You have some osteoporosis and I think we should start a medication for it (Fosamax). This should be taken once a week, sitting upright, on empty stomach. Do not recline for at least 1 hour after taking this drug.

## 2021-08-26 NOTE — PROGRESS NOTES
Service Date: 2021    Arsalan Blas MD  [facility] - Internal Medicine  [address] [place] [address]  [id number]    YEIMI Alanis, CNP  [facility] - Neurology   [address] [place]  [address]    RE:     Jenna Jhaveri  MRN:  [id number]  :  1950    Dear Doctors:      I had the pleasure to see Jenna Jhaveri in followup at the [facility].  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  She had noticed symptoms in the spring of 2020 with pain and numbness in the buttocks, left more than right, but she developed slowing in her gait and difficulty walking stairs in the summer of , and weakness worsened significantly after 2020 when she was diagnosed with chronic pulmonary embolism and she had dyspnea and some orthopnea.  Weakness continued to inexorably deteriorate.  She had an EMG with us in 2021 that showed a marked irritable myopathy, in contrast to an EMG done in [place] in 2021 that was interestingly reported as no myopathic changes.  Her CK was elevated at 2000, and she had marked proximal weakness.  She was unable to get up from a chair without assistance and had trouble raising the arms overhead as well.  She was on statins in the past but stopped it earlier in  without any improvement.  HMG-CoA reductase IgG antibodies in serum were markedly elevated at over 200, polymyositis antibody panel was negative, and GAA enzyme activity in leukocytes was negative, ruling out Pompe disease.  Once I saw those results, I immediately put her on IVIG 0.4 g/kg daily x5 and we repeated the cycle every 28 days for May,  and July.  I subsequently continued maintenance IVIG at 0.8 g/kg every 2 weeks, and she got her last infusion last week.  I also put her initially on prednisone 60 mg daily with  a gradual taper to 0, but then on 07/28 she called me reporting persistent pains in her bilateral lower extremities.  The pain was starting at the back and buttocks and radiating down the legs, and because I was not sure that pain represented a lumbar spine problem or recurrence of her myopathy, I asked her to stay on prednisone 20 mg daily and the IVIG. Ms Mo saw her and increased her Cymbalta dose to 40 mg daily (from 20) and added baclofen in the evening. This helped and the pain is much better now. She is not on any other immunosuppressant yet.    Since the last time I saw her, she also got a mammogram that was normal, a repeat bone density scan that showed a T score of -2.1 on the hip and -2.2 on the lumbar spine, with 27% loss of bone density in the hip compared to 10 years ago and 18% loss at the lumbar spine.  CT chest, abdomen and pelvis showed no evidence of malignancy.  She feels much better now with the treatment, but she is still about 30% back to normal, not fully.  She still has some difficulty getting up from low-seated chairs, and her right leg still does not clear well when walking.  She has not had any falls.  She uses a walker at times.  She now, however, has no difficulty raising arms overhead or grasping heavy objects with her hands.  She does not have any trouble with fine hand movements, except for a tremor that has been there for a while.  The tremor was treated by Ms. Mo recently with extended-release propranolol.  She used to be on primidone until recently, but it was gradually discontinued.  For osteopenia she is on calcium and vitamin D, but she is not on any antiresorptive treatment.  She is due to have repeat spirometry in 3 weeks.  Her chest CT done in the spring of 2021 did not show any significant interstitial lung disease or infiltrates to explain the restrictive pulmonary function tests.      Medications were reviewed and are as per Epic record.    BP (!) 150/88   Pulse  81   Resp 16   SpO2 98%      PFT Latest Ref Rng & Units 8/26/2021   FVC L 1.42   FEV1 L 1.10   FVC% % 52   FEV1% % 51     PHYSICAL EXAMINATION:  She has no dysphagia or other cranial nerve symptoms.  On exam, she is awake, alert, oriented x3.  Her neck flexion is still 4/5, a bit weak.  Extension is full.  There is no weakness of orbicularis oculi or oris.  There is no dysphonia or dysarthria.  She has marked improvement of her strength compared to the last visit.  Her deltoid strength is now 5 bilaterally and so is biceps.  Triceps is right 5, left 4+.  Wrist extensors, finger extensors are bilaterally 5.  FDI is right 4, left 5.  APB is bilaterally 4 to 4+.  Her hip flexion is now 4/5 with a marked improvement, was previously 2 on the right and 3 on the left.  Knee extension, knee flexion bilaterally 5, and foot dorsiflexion is now 5, previously 4 on the right.  Hip adduction is 5/5 bilaterally.  Hip abduction is right 4, left 5-.  She was able even to get up from a chair with arms crossed on the chest very slowly.    CK today was low (<27). CBC, AST, and ALT were normal.    In summary, Ms. Jhaveri has autoimmune necrotizing myopathy with high positive HMGCR IgG antibody titer.  This could relate to prior statin exposure.  She has improved substantially with immunotherapy, although she is not completely back to normal.  I told her that her recovery may be slow because she was untreated for 9-10 months when we first saw her. 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 Thus, I will taper her prednisone to 10 mg daily for 2 weeks, 10 mg every other day for two weeks, then stop it. I also believe we can reduce the frequency of her IVIG infusions to every 3 weeks for another two months, then every 4 weeks. I will also start her on nonsteroidal immunosuppressant drug like azathioprine.  Side effects of azathioprine were explained in detail.  She  will start 50 mg b.i.d. and gradually titrate the dose to a goal of at least 200 mg daily after a month or two.  She will get CBC, AST and ALT checked today as well as at 2, 4, 8 and 12 weeks after starting the drug.  I also reviewed her bone density scan and she has osteoporosis.  Because she is on steroids, I think this needs to be treated more aggressively and I would recommend starting alendronate 35 mg weekly.  Side effects were explained including the fact that she should not recline for at least an hour after taking the drug due to the risk of reflux.  She will return to clinic for followup in 3 months or sooner if necessary. Her restrictive respiratory pattern could relate to diaphragmatic muscle weakness from the myopathy; overall values are stable from last visit.     [place] also asked me about the booster COVID-19 shots, and I told her that she likely qualifies because, based on the latest CDC recommendations, those apply for immunosuppressed patients including those on maintenance prednisone dose of 20 mg a day or more.  She should talk to her primary care doctor's office about scheduling it.     Total time spent on this encounter today 45 minutes including 25 with the patient, 10 on post-visit note dictation, editing and orders, and at least 10 in pre-visit chart review.    Sincerely,    Keron Valencia MD        D: 2021   T: 2021   MT: [email or web address]    Name:     OSMANI SOLANO  MRN:      [id number]        Account:      [id number]   :      1950           Service Date: 2021       Document: [id number]

## 2021-08-27 LAB
EXPTIME-PRE: 5.66 SEC
FEF2575-%PRED-PRE: 49 %
FEF2575-PRE: 0.88 L/SEC
FEF2575-PRED: 1.8 L/SEC
FEFMAX-%PRED-PRE: 73 %
FEFMAX-PRE: 3.96 L/SEC
FEFMAX-PRED: 5.42 L/SEC
FEV1-%PRED-PRE: 51 %
FEV1-PRE: 1.1 L
FEV1FEV6-PRE: 77 %
FEV1FEV6-PRED: 79 %
FEV1FVC-PRE: 77 %
FEV1FVC-PRED: 78 %
FIFMAX-PRE: 3.14 L/SEC
FVC-%PRED-PRE: 52 %
FVC-PRE: 1.42 L
FVC-PRED: 2.72 L
MEP-PRE: 43 CMH2O
MIP-PRE: -50 CMH2O

## 2021-08-30 DIAGNOSIS — M33.20 POLYMYOSITIS (H): ICD-10-CM

## 2021-08-30 RX ORDER — PREDNISONE 10 MG/1
TABLET ORAL
Qty: 30 TABLET | Refills: 1 | Status: SHIPPED | OUTPATIENT
Start: 2021-08-30 | End: 2023-09-22

## 2021-08-30 NOTE — TELEPHONE ENCOUNTER
IVIG therapy plan dosing changed.  New orders faxed to [facility] (fax [phone]) and [place] updated via phone call. Also updated [place] on her prednisone taper (reduce her prednisone dose to 10 mg daily now for 2 weeks, then 10 mg every other day for 2 weeks, then stop.) [place] had the following questions for Dr. Valencia:  1. PFT results?  2. Should she stay on the bactrim and omeprazole?  3. Is Imuran ultimately going to take the place of IVIG?  She wants to know why she was started on it.    Routing to Dr. Watts, please advise.    Fany Vincent RN

## 2021-08-31 NOTE — TELEPHONE ENCOUNTER
Answers to her questions: 1) PFTs were abnormal- her vital capacity is about 52% of normal. It is slightly better than the previous measurement which was about 48% but the difference is not very significant. This result indicates a weak breathing muscle (diaphragm) which is likely a result of her muscle disease. 2) No, Bactrim and omeprazole should be stopped when she stops the steroids. 3) Yes, the reason Imuran was prescribed is to allow her disease to go into remission without having to give her IVIG infusions forever. Thanks

## 2021-09-04 ENCOUNTER — HEALTH MAINTENANCE LETTER (OUTPATIENT)
Age: 71
End: 2021-09-04

## 2021-09-14 ENCOUNTER — TRANSFERRED RECORDS (OUTPATIENT)
Dept: HEALTH INFORMATION MANAGEMENT | Facility: CLINIC | Age: 71
End: 2021-09-14

## 2021-09-28 ENCOUNTER — TRANSFERRED RECORDS (OUTPATIENT)
Dept: HEALTH INFORMATION MANAGEMENT | Facility: CLINIC | Age: 71
End: 2021-09-28

## 2021-10-12 ENCOUNTER — TELEPHONE (OUTPATIENT)
Dept: NEUROLOGY | Facility: CLINIC | Age: 71
End: 2021-10-12

## 2021-10-12 NOTE — TELEPHONE ENCOUNTER
[facility] Call Center    Phone Message    May a detailed message be left on voicemail: yes     Reason for Call: Other: Dr. Blas is requesting a call back to coordinate care for patient, please call Dr. Blas at [phone] to advise.     Action Taken: Message routed to:  [facility] ([facility]rology    Travel Screening: Not Applicable

## 2021-10-13 NOTE — TELEPHONE ENCOUNTER
Done- asked PCP to recheck [place]'s CBC and AST, ALT due to recently discovered thrombocytosis (plt count 612,000 on 9/28) for which no good explanation exists. If thrombocytosis persists she will need workup for infection or malignancy by PCP or Hem/Onc. Thanks

## 2021-10-15 ENCOUNTER — TRANSFERRED RECORDS (OUTPATIENT)
Dept: HEALTH INFORMATION MANAGEMENT | Facility: CLINIC | Age: 71
End: 2021-10-15
Payer: COMMERCIAL

## 2021-10-15 ENCOUNTER — MEDICAL CORRESPONDENCE (OUTPATIENT)
Dept: HEALTH INFORMATION MANAGEMENT | Facility: CLINIC | Age: 71
End: 2021-10-15
Payer: COMMERCIAL

## 2021-10-26 ENCOUNTER — TRANSFERRED RECORDS (OUTPATIENT)
Dept: HEALTH INFORMATION MANAGEMENT | Facility: CLINIC | Age: 71
End: 2021-10-26
Payer: COMMERCIAL

## 2021-11-17 ENCOUNTER — TRANSFERRED RECORDS (OUTPATIENT)
Dept: HEALTH INFORMATION MANAGEMENT | Facility: CLINIC | Age: 71
End: 2021-11-17
Payer: COMMERCIAL

## 2021-11-23 ENCOUNTER — TRANSFERRED RECORDS (OUTPATIENT)
Dept: HEALTH INFORMATION MANAGEMENT | Facility: CLINIC | Age: 71
End: 2021-11-23
Payer: COMMERCIAL

## 2021-11-26 ENCOUNTER — TRANSCRIBE ORDERS (OUTPATIENT)
Dept: OTHER | Age: 71
End: 2021-11-26
Payer: COMMERCIAL

## 2021-11-26 DIAGNOSIS — M79.2 NEUROPATHIC PAIN: ICD-10-CM

## 2021-11-26 DIAGNOSIS — M54.50 CHRONIC BILATERAL LOW BACK PAIN WITHOUT SCIATICA: Primary | ICD-10-CM

## 2021-11-26 DIAGNOSIS — G89.29 CHRONIC BILATERAL LOW BACK PAIN WITHOUT SCIATICA: Primary | ICD-10-CM

## 2021-11-26 ASSESSMENT — ENCOUNTER SYMPTOMS
DIZZINESS: 0
MUSCLE WEAKNESS: 0
HEMATURIA: 0
STIFFNESS: 0
JOINT SWELLING: 0
FLANK PAIN: 0
LOSS OF CONSCIOUSNESS: 0
ARTHRALGIAS: 0
NECK PAIN: 0
DIFFICULTY URINATING: 0
MEMORY LOSS: 0
TREMORS: 1
PARALYSIS: 0
NUMBNESS: 1
SEIZURES: 0
BACK PAIN: 1
DYSURIA: 0
HEADACHES: 0
SPEECH CHANGE: 0
MYALGIAS: 0
DISTURBANCES IN COORDINATION: 1
WEAKNESS: 0
TINGLING: 1
MUSCLE CRAMPS: 0

## 2021-11-29 ENCOUNTER — LAB (OUTPATIENT)
Dept: LAB | Facility: CLINIC | Age: 71
End: 2021-11-29
Payer: COMMERCIAL

## 2021-11-29 ENCOUNTER — OFFICE VISIT (OUTPATIENT)
Dept: NEUROLOGY | Facility: CLINIC | Age: 71
End: 2021-11-29
Payer: COMMERCIAL

## 2021-11-29 VITALS
SYSTOLIC BLOOD PRESSURE: 125 MMHG | HEART RATE: 80 BPM | OXYGEN SATURATION: 95 % | DIASTOLIC BLOOD PRESSURE: 85 MMHG | RESPIRATION RATE: 16 BRPM

## 2021-11-29 DIAGNOSIS — G72.49 AUTOIMMUNE NECROTIZING MYOPATHY: ICD-10-CM

## 2021-11-29 DIAGNOSIS — G72.49 AUTOIMMUNE NECROTIZING MYOPATHY: Primary | ICD-10-CM

## 2021-11-29 DIAGNOSIS — Z87.39 HISTORY OF SPINAL FUSION FOR SCOLIOSIS: ICD-10-CM

## 2021-11-29 DIAGNOSIS — Z98.1 HISTORY OF SPINAL FUSION FOR SCOLIOSIS: ICD-10-CM

## 2021-11-29 LAB — CK SERPL-CCNC: 31 U/L (ref 30–225)

## 2021-11-29 PROCEDURE — 99214 OFFICE O/P EST MOD 30 MIN: CPT | Performed by: PSYCHIATRY & NEUROLOGY

## 2021-11-29 PROCEDURE — 82550 ASSAY OF CK (CPK): CPT | Performed by: PATHOLOGY

## 2021-11-29 PROCEDURE — 36415 COLL VENOUS BLD VENIPUNCTURE: CPT | Performed by: PATHOLOGY

## 2021-11-29 RX ORDER — BACLOFEN 10 MG/1
10 TABLET ORAL 2 TIMES DAILY
COMMUNITY
Start: 2021-11-09 | End: 2023-09-22

## 2021-11-29 RX ORDER — PROPRANOLOL HYDROCHLORIDE 10 MG/1
10 TABLET ORAL
COMMUNITY
Start: 2021-11-09 | End: 2024-04-01

## 2021-11-29 RX ORDER — OMEPRAZOLE 40 MG/1
40 CAPSULE, DELAYED RELEASE ORAL DAILY
COMMUNITY
Start: 2021-10-15

## 2021-11-29 RX ORDER — AZATHIOPRINE 50 MG/1
50 TABLET ORAL 2 TIMES DAILY
COMMUNITY
End: 2023-09-18

## 2021-11-29 RX ORDER — DULOXETIN HYDROCHLORIDE 20 MG/1
CAPSULE, DELAYED RELEASE ORAL
COMMUNITY
Start: 2021-11-16 | End: 2023-09-22

## 2021-11-29 ASSESSMENT — PAIN SCALES - GENERAL: PAINLEVEL: SEVERE PAIN (6)

## 2021-11-29 NOTE — LETTER
2021       RE: Jenna Jhaveri  [address] [facility] [address]     Dear Colleague,    Thank you for referring your patient, Jenna Jhaveri, to the [facility] NEUROLOGY CLINIC [place] at [facility]. Please see a copy of my visit note below.    Service Date: 2021    MD Monet Zambrano, APRN, CNP  [facility]  [address] [place] [address]    RE:  Jenna Jhaveri  MRN: [id number]  : 1950    Dear Doctors:    I had the pleasure to see Jenna in followup at the [facility] for her HMG-CoA reductase IgA antibody positive autoimmune necrotizing myopathy.  She was diagnosed by us in 2021.  She was treated with IVIG initially and responded very well.  She was also treated initially with high dose of prednisone up to 50 mg daily that was gradually tapered.  She has stopped the prednisone now for more than 2 months.  We also started her on azathioprine in late 2021.  She is currently on 50 mg b.i.d.  The plan was to gradually escalate the AZA to 200 mg daily, but when she was on a dose of 150 mg daily, she was complaining of a lot of nausea and diarrhea and I asked her to taper it down to 100 daily.  She has had labs to monitor for azathioprine complications in the last few months, which were all normal including AST, ALT and CBC last done on 2021 that I personally reviewed.      Her strength has improved markedly. She continues to have some problems that are unrelated to the HMGCR IgG myopathy. She has chronic kyphoscoliosis and a remote history of spine fusion more than 20 years ago.  She had a recurrence of severe mid and lower back pain on 2021 leading to repeat x-ray that showed fractured fusion.  She was evaluated by one neurosurgeon and another spine specialists in [place], who declined to operate and she will be seen at [facility] in a couple of  weeks for another opinion.  She is experiencing quite severe pain in the left posterior hip and gluteal region towards the sacroiliac joint.  The pain is radiating to the proximal upper thigh, but not below there. Ms. Mo initially felt it was sciatica and recommended her to use local heat and Tylenol, but this is not enough.  The patient has tried ibuprofen in the past, which used to help. Her doctor recently increased her omeprazole dose from 20 mg to 40 mg daily.  She has not had recent prominent GI complaints after going down to 2 pills of azathioprine a day.  Her next IVIG infusion of 0.8 g/kg is due 12/09/2021. She is now on every 4 weeks IVIG.     She also has osteoporosis and I started her on alendronate 35 mg weekly on 8/2021.    She denies dysphagia, head drop, ptosis, orthopnea.  She has mild dyspnea on exertion, unchanged from last visit.  Her pulmonary function tests done on 08/26/2021 showed quite significant restriction with FVC of 50% and FEV1 similar.  I have discussed this with her pulmonologist, Dr. Maciel, on the phone and it is felt that a lot of this is related to kyphoscoliosis.  She is using CPAP at night.  We did not recommend making any changes to it.     Medications were reviewed and are as per Epic record.      /85   Pulse 80   Resp 16   SpO2 95%     NEUROLOGIC EXAMINATION:  On exam, she is in good spirits. There is no ptosis or ophthalmoparesis, no weakness of orbicularis oculi or oris.  No dysphonia or dysarthria.  Neck flexion and extension strength are full.  Strength is full in the upper extremities proximally and distally.  In the legs, hip flexion is 4 on the right 4+ left.    Hip adduction and abduction are fairly strong.  Knee extension, knee flexion, foot dorsiflexion are also 5.    CK today was low (31).    In summary, I think that Jenna is doing very well with regard to her necrotizing myopathy. Strength has improved a lot and CK is normal to low. At this  point I would recommend holding the IVIG after the 2021 infusion.  I will then see her in followup in 2 months.  She is off steroids too.  We will continue the azathioprine.  I was hoping that we could increase the dose a little bit because 50 mg b.i.d. will probably not provide adequate immunosuppression in a person of >150 lbs weight. She will increase to 150 mg daily after a week to 10 days (see below why).  Labs, including CBC, AST and ALT have so far been satisfactory in the last 3 months.    She has musculoskeletal pain in the left posterior hip, which could be due to sacroiliac joint involvement or less likely sciatica.  She also has recently identified fractured spine fusion for which she will get a second surgical opinion soon.  I asked her to try ibuprofen 600 mg b.i.d. to t.i.d. as needed for severe pain for about 5-6 days.  I discouraged her from prolonged oral ibuprofen used because this can increase cardiovascular risk, risk of renal failure, etc.  She understands those recommendations.  After she has finished her ibuprofen course, she can increase the azathioprine dose as above.     I will see her in followup in 2 months or sooner if needed. The next visit can be virtually or in person per patient preference.    Billing MDM level 4, moderate, based on 1) Problems assessed: One or more chronic illness with exacerbation, progression or side effects of treatment (necrotizing myopathy, some side effects reported from azathioprine) and 2) Risk: prescription drug management- on IVIG and azathioprine requiring monitoring for toxicity.    Sincerely,     Keron Valencia MD        D: 2021   T: 2021   MT: FRANKLYN    Name:     OSMANI SOLANO  MRN:      [id number]        Account:      [id number]   :      1950           Service Date: 2021       Document: [id number]

## 2021-11-29 NOTE — PATIENT INSTRUCTIONS
1) Re: pain in your hip/gluteal area: It may be due to the broken fusion or possibly something unrelated like sacroiliac joint pain, bursitis, etc. Either way ibuprofen may help. You can try 600 mg of ibuprofen two to three times a day, no more than 5-6 days please.  After you finish your ibuprofen course please consider increasing your azathioprine to 2 pills in the evening and 1 in the morning.  Blood draw today. If CK is normal, no further action needed. If it is very high we may need to restart the IVIG.    Follow up 2 months (virtual is ok). Thank you

## 2021-11-29 NOTE — PROGRESS NOTES
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  She was diagnosed by us in 2021.  She was treated with IVIG initially and responded very well.  She was also treated initially with high dose of prednisone up to 50 mg daily that was gradually tapered.  She has stopped the prednisone now for more than 2 months.  We also started her on azathioprine in late 2021.  She is currently on 50 mg b.i.d.  The plan was to gradually escalate the AZA to 200 mg daily, but when she was on a dose of 150 mg daily, she was complaining of a lot of nausea and diarrhea and I asked her to taper it down to 100 daily.  She has had labs to monitor for azathioprine complications in the last few months, which were all normal including AST, ALT and CBC last done on 2021 that I personally reviewed.      Her strength has improved markedly. She continues to have some problems that are unrelated to the HMGCR IgG myopathy. She has chronic kyphoscoliosis and a remote history of spine fusion more than 20 years ago.  She had a recurrence of severe mid and lower back pain on 2021 leading to repeat x-ray that showed fractured fusion.  She was evaluated by one neurosurgeon and another spine specialists in [place], who declined to operate and she will be seen at [facility] in a couple of weeks for another opinion.  She is experiencing quite severe pain in the left posterior hip and gluteal region towards the sacroiliac joint.  The pain is radiating to the proximal upper thigh, but not below there. Ms. Mo initially felt it was sciatica and recommended her to use local heat and Tylenol, but this is  not enough.  The patient has tried ibuprofen in the past, which used to help. Her doctor recently increased her omeprazole dose from 20 mg to 40 mg daily.  She has not had recent prominent GI complaints after going down to 2 pills of azathioprine a day.  Her next IVIG infusion of 0.8 g/kg is due 12/09/2021. She is now on every 4 weeks IVIG.     She also has osteoporosis and I started her on alendronate 35 mg weekly on 8/2021.    She denies dysphagia, head drop, ptosis, orthopnea.  She has mild dyspnea on exertion, unchanged from last visit.  Her pulmonary function tests done on 08/26/2021 showed quite significant restriction with FVC of 50% and FEV1 similar.  I have discussed this with her pulmonologist, Dr. Maciel, on the phone and it is felt that a lot of this is related to kyphoscoliosis.  She is using CPAP at night.  We did not recommend making any changes to it.     Medications were reviewed and are as per Epic record.      /85   Pulse 80   Resp 16   SpO2 95%     NEUROLOGIC EXAMINATION:  On exam, she is in good spirits. There is no ptosis or ophthalmoparesis, no weakness of orbicularis oculi or oris.  No dysphonia or dysarthria.  Neck flexion and extension strength are full.  Strength is full in the upper extremities proximally and distally.  In the legs, hip flexion is 4 on the right 4+ left.    Hip adduction and abduction are fairly strong.  Knee extension, knee flexion, foot dorsiflexion are also 5.    CK today was low (31).    In summary, I think that Jenna is doing very well with regard to her necrotizing myopathy. Strength has improved a lot and CK is normal to low. At this point I would recommend holding the IVIG after the 12/09/2021 infusion.  I will then see her in followup in 2 months.  She is off steroids too.  We will continue the azathioprine.  I was hoping that we could increase the dose a little bit because 50 mg b.i.d. will probably not provide adequate immunosuppression in a  person of >150 lbs weight. She will increase to 150 mg daily after a week to 10 days (see below why).  Labs, including CBC, AST and ALT have so far been satisfactory in the last 3 months.    She has musculoskeletal pain in the left posterior hip, which could be due to sacroiliac joint involvement or less likely sciatica.  She also has recently identified fractured spine fusion for which she will get a second surgical opinion soon.  I asked her to try ibuprofen 600 mg b.i.d. to t.i.d. as needed for severe pain for about 5-6 days.  I discouraged her from prolonged oral ibuprofen used because this can increase cardiovascular risk, risk of renal failure, etc.  She understands those recommendations.  After she has finished her ibuprofen course, she can increase the azathioprine dose as above.     I will see her in followup in 2 months or sooner if needed. The next visit can be virtually or in person per patient preference.    Billing MDM level 4, moderate, based on 1) Problems assessed: One or more chronic illness with exacerbation, progression or side effects of treatment (necrotizing myopathy, some side effects reported from azathioprine) and 2) Risk: prescription drug management- on IVIG and azathioprine requiring monitoring for toxicity.    Sincerely,     Keron Valencia MD        D: 2021   T: 2021   MT: FRANKLYN    Name:     OSMANI SOLANO  MRN:      [id number]        Account:      [id number]   :      1950           Service Date: 2021       Document: [id number]

## 2021-12-14 ENCOUNTER — TELEPHONE (OUTPATIENT)
Dept: NEUROLOGY | Facility: CLINIC | Age: 71
End: 2021-12-14
Payer: COMMERCIAL

## 2021-12-14 NOTE — TELEPHONE ENCOUNTER
[facility] Call Center    Phone Message    May a detailed message be left on voicemail: yes     Reason for Call: Tori from [facility] Infusion department called. They need clarification on pt's IVIG order on 11/29/21. Phone [phone]  fax [phone]    Action Taken: Other: csc    Travel Screening: Not Applicable

## 2021-12-15 NOTE — TELEPHONE ENCOUNTER
Spoke with pharmacist at [facility] and explained that Virginia will be stopping IVIG after her 12/8 infusion.  Will follow up with the [facility] after Virginia's next office visit in two months.    Fany Vincent RN

## 2021-12-20 DIAGNOSIS — G72.49 AUTOIMMUNE NECROTIZING MYOPATHY: ICD-10-CM

## 2021-12-20 RX ORDER — AZATHIOPRINE 50 MG/1
TABLET ORAL
Qty: 90 TABLET | Refills: 2 | Status: SHIPPED | OUTPATIENT
Start: 2021-12-20 | End: 2022-01-12

## 2022-01-12 DIAGNOSIS — G72.49 AUTOIMMUNE NECROTIZING MYOPATHY: ICD-10-CM

## 2022-01-12 RX ORDER — AZATHIOPRINE 50 MG/1
TABLET ORAL
Qty: 120 TABLET | Refills: 2 | Status: SHIPPED | OUTPATIENT
Start: 2022-01-12 | End: 2022-04-14

## 2022-01-26 ENCOUNTER — VIRTUAL VISIT (OUTPATIENT)
Dept: NEUROLOGY | Facility: CLINIC | Age: 72
End: 2022-01-26
Payer: COMMERCIAL

## 2022-01-26 ENCOUNTER — TELEPHONE (OUTPATIENT)
Dept: NEUROLOGY | Facility: CLINIC | Age: 72
End: 2022-01-26

## 2022-01-26 DIAGNOSIS — R13.10 DYSPHAGIA, UNSPECIFIED TYPE: ICD-10-CM

## 2022-01-26 DIAGNOSIS — M41.9 KYPHOSCOLIOSIS: ICD-10-CM

## 2022-01-26 DIAGNOSIS — M84.48XD PATHOLOGICAL FRACTURE OF VERTEBRA WITH ROUTINE HEALING, UNSPECIFIED PATHOLOGICAL CAUSE, SUBSEQUENT ENCOUNTER: ICD-10-CM

## 2022-01-26 DIAGNOSIS — N39.41 URGE INCONTINENCE OF URINE: ICD-10-CM

## 2022-01-26 DIAGNOSIS — G72.49 AUTOIMMUNE NECROTIZING MYOPATHY: Primary | ICD-10-CM

## 2022-01-26 PROCEDURE — 99214 OFFICE O/P EST MOD 30 MIN: CPT | Mod: 95 | Performed by: PSYCHIATRY & NEUROLOGY

## 2022-01-26 NOTE — PATIENT INSTRUCTIONS
Labs this week please, add a CK  Please talk to your Urologist  Please get the MRIs of the spine soon  If your CK is high this means that the muscle disease has relapsed. We will have to treat it again with IVIG before you do any back surgery  Will order speech therapy evaluation for your swallowing problem    Follow up 2 months

## 2022-01-26 NOTE — PROGRESS NOTES
Service Date: 2022    [place] [address]    RE:      Jenna Jhaveri  MRN:  [id number]  :   1950    Dear Dr. Bals:    I had the pleasure to evaluate Jenna Jhaveri via a billable video visit today.  Jenna is a 72-year-old woman with HMG-CoA reductase antibody-positive necrotizing myopathy diagnosed by us in 2021. She had a very good response to IVIG treatment, plus high doses of prednisone and azathioprine. Prednisone has been stopped.  Her last IVIG infusion was 2021.  In my opinion, her disease entered remission as her strength has improved markedly, and her CK normalized. In fact, the last CK we have on record was 31, which is low.  She is now on azathioprine 200 mg daily in 2 divided doses.  She last had labs checked in 2021 on a lower azathioprine dose.  AST, ALT and CBC at that time on  were normal.  She is due to get repeat blood tests in the next week.      What has been more problematic recently is Ira back.  She has a history of kyphoscoliosis, status post spinal fusion more than 20 years ago.  She was evaluated at the [facility] for worsening back pain radiating to the left posterior hip and gluteal region.  She was found to have a vertebral body fracture and an unstable fusion and will require repeat surgery.  However, because of weakness of her axial muscles, it was felt that surgery would not be ideal upfront, therefore she was placed on a spine brace for a total of 8 weeks.  She will be seen by the Spine surgeon again in mid 2022.  Concerningly enough, she reported to me a few weeks ago new onset urine incontinence with loss of control of bladder; she is not getting any warning that the bladder is full.  Urine leaks immediately when she stands.  [facility], but she has not done that yet. She had seen a urologist in [facility]  in November, but this was before she developed the new bladder symptoms.  A UA a few months ago was normal.      She also reports new dysphagia.  She did not have this symptom in November. She chokes, and often with liquids,  not only with solids.  It is frequent and a bit alarming.  She has not noticed any change in her voice or speech.  No ptosis or diplopia. Overall she finds herself leaning forward when walking, which may have to do with her vertebral fracture and her fusion.  She does not notice any clear loss of arm strength, although she is a little clumsy with hand movements due to tremor.    In summary, Osmani has autoimmune necrotizing myopathy. Her report of new dysphagia is concerning to me, and I will request a speech therapy evaluation locally, and repeat a CK.  If the CK is rising, this would signal that her disease has relapsed, and she should be treated again with IVIG.  In that case, any thoracic or lumbar spine surgery would need to be postponed.  She will need to repeat labs by the end of the week, including CBC, AST and ALT, due to being on azathioprine and also a CK as outlined above.  I  I am concerned with her worsening back pain and the vertebral fractures exactly because she is reporting incontinence.  I urged her again to get an MRI of the thoracic and lumbar spine as soon as possible and report the results to her Spine surgeon as well.  If there is cord or cauda equina compression, then surgery cannot wait.  I also asked her to talk to her urologist about the incontinence as well.  She will return to clinic for followup in 2 months or sooner if needed.    Total time spent on this encounter today was 30 minutes, including 15 on video call, 10 on post visit note dictation, editing and orders and 5 on pre-visit chart review.    Sincerely,    Keron Valencia MD        D: 01/26/2022   T: 01/26/2022   MT: nilay    Name:     OSMANI SOLANO  MRN:      [id number]        Account:       [id number]   :      1950           Service Date: 2022       Document: [id number]

## 2022-01-26 NOTE — PROGRESS NOTES
Virginia is a 72 year old who is being evaluated via a billable video visit.      How would you like to obtain your AVS? MyChart  If the video visit is dropped, the invitation should be resent by: Send to e-mail at: [email or web address]  Will anyone else be joining your video visit? No      Video Start Time: 10.00 am  Video-Visit Details    Type of service:  Video Visit    Video End Time:10.15 am    Originating Location (pt. Location): Home    Distant Location (provider location):  [facility]     Platform used for Video Visit: [facility]    Chief Complaint   Patient presents with     RECHECK     VIDEO VISIT RETURN      Panchito Mason

## 2022-01-26 NOTE — TELEPHONE ENCOUNTER
Lab order faxed to Eduardo Lawler (phone [phone], fax [phone]).  Speech therapy referral mailed to Wilber Vincent RN

## 2022-01-26 NOTE — LETTER
2022       RE: Jenna Jhaveri  [address] [address]     Dear Colleague,    Thank you for referring your patient, Jenna Jhaveri, to the [facility] [place] [facility]. Please see a copy of my visit note below.        Service Date: 2022    Arsalan Blas MD   [place] [id number]    RE:      Jenna Jhaveri  MRN:  [id number]  :   1950    Dear Dr. Blas:    I had the pleasure to evaluate Jenna Jhaveri via a billable video visit today.  Jenna is a 72-year-old woman with HMG-CoA reductase antibody-positive necrotizing myopathy diagnosed by us in 2021. She had a very good response to IVIG treatment, plus high doses of prednisone and azathioprine. Prednisone has been stopped.  Her last IVIG infusion was 2021.  In my opinion, her disease entered remission as her strength has improved markedly, and her CK normalized. In fact, the last CK we have on record was 31, which is low.  She is now on azathioprine 200 mg daily in 2 divided doses.  She last had labs checked in 2021 on a lower azathioprine dose.  AST, ALT and CBC at that time on  were normal.  She is due to get repeat blood tests in the next week.      What has been more problematic recently is Binhs back.  She has a history of kyphoscoliosis, status post spinal fusion more than 20 years ago.  She was evaluated at the [facility] for worsening back pain radiating to the left posterior hip and gluteal region.  She was found to have a vertebral body fracture and an unstable fusion and will require repeat surgery.  However, because of weakness of her axial muscles, it was felt that surgery would not be ideal upfront, therefore she was placed on a spine brace for a total of 8 weeks.  She will be seen by the Spine surgeon again in mid 2022.  Concerningly enough, she  reported to me a few weeks ago new onset urine incontinence with loss of control of bladder; she is not getting any warning that the bladder is full.  Urine leaks immediately when she stands.  I asked her to get ASAP MRI of the thoracic and lumbar spine, but she has not done that yet. She had seen a urologist in [facility] in November, but this was before she developed the new bladder symptoms.  A UA a few months ago was normal.      She also reports new dysphagia.  She did not have this symptom in November. She chokes, and often with liquids,  not only with solids.  It is frequent and a bit alarming.  She has not noticed any change in her voice or speech.  No ptosis or diplopia. Overall she finds herself leaning forward when walking, which may have to do with her vertebral fracture and her fusion.  She does not notice any clear loss of arm strength, although she is a little clumsy with hand movements due to tremor.    In summary, Jenna has autoimmune necrotizing myopathy. Her report of new dysphagia is concerning to me, and I will request a speech therapy evaluation locally, and repeat a CK.  If the CK is rising, this would signal that her disease has relapsed, and she should be treated again with IVIG.  In that case, any thoracic or lumbar spine surgery would need to be postponed.  She will need to repeat labs by the end of the week, including CBC, AST and ALT, due to being on azathioprine and also a CK as outlined above.  I  I am concerned with her worsening back pain and the vertebral fractures exactly because she is reporting incontinence.  I urged her again to get an MRI of the thoracic and lumbar spine as soon as possible and report the results to her Spine surgeon as well.  If there is cord or cauda equina compression, then surgery cannot wait.  I also asked her to talk to her urologist about the incontinence as well.  She will return to clinic for followup in 2 months or sooner if needed.    Total time  spent on this encounter today was 30 minutes, including 15 on video call, 10 on post visit note dictation, editing and orders and 5 on pre-visit chart review.    Sincerely,    Keron Valencia MD        D: 2022   T: 2022   MT: nilay    Name:     OSMANI SOLANO  MRN:      [id number]        Account:      [id number]   :      1950           Service Date: 2022       Document: [id number]

## 2022-01-28 ENCOUNTER — TRANSFERRED RECORDS (OUTPATIENT)
Dept: HEALTH INFORMATION MANAGEMENT | Facility: CLINIC | Age: 72
End: 2022-01-28
Payer: COMMERCIAL

## 2022-02-11 DIAGNOSIS — R13.10 DYSPHAGIA, UNSPECIFIED TYPE: Primary | ICD-10-CM

## 2022-02-14 ENCOUNTER — TELEPHONE (OUTPATIENT)
Dept: NEUROLOGY | Facility: CLINIC | Age: 72
End: 2022-02-14
Payer: COMMERCIAL

## 2022-02-14 NOTE — TELEPHONE ENCOUNTER
[facility] Call Center    Phone Message    May a detailed message be left on voicemail: yes     Reason for Call: Order(s): Other:   Reason for requested: Video Swallow    Date needed: asap  Provider name: Dr. Valencia    FAX# send order to: [phone]    Thank you.      Action Taken: Message routed to:  [facility] ([facility]    Travel Screening: Not Applicable

## 2022-02-17 ENCOUNTER — TRANSFERRED RECORDS (OUTPATIENT)
Dept: HEALTH INFORMATION MANAGEMENT | Facility: CLINIC | Age: 72
End: 2022-02-17
Payer: COMMERCIAL

## 2022-02-21 DIAGNOSIS — R13.10 DYSPHAGIA, UNSPECIFIED TYPE: Primary | ICD-10-CM

## 2022-03-30 ENCOUNTER — VIRTUAL VISIT (OUTPATIENT)
Dept: NEUROLOGY | Facility: CLINIC | Age: 72
End: 2022-03-30
Payer: COMMERCIAL

## 2022-03-30 DIAGNOSIS — G72.49 AUTOIMMUNE NECROTIZING MYOPATHY: Primary | ICD-10-CM

## 2022-03-30 DIAGNOSIS — M41.9 KYPHOSCOLIOSIS: ICD-10-CM

## 2022-03-30 DIAGNOSIS — G25.0 ESSENTIAL TREMOR: ICD-10-CM

## 2022-03-30 PROCEDURE — 99214 OFFICE O/P EST MOD 30 MIN: CPT | Mod: 95 | Performed by: PSYCHIATRY & NEUROLOGY

## 2022-03-30 RX ORDER — HYDROCODONE BITARTRATE AND ACETAMINOPHEN 5; 325 MG/1; MG/1
TABLET ORAL
COMMUNITY
End: 2023-09-22

## 2022-03-30 NOTE — LETTER
3/30/2022       RE: Jenna Jhaveri  [address] [address]     Dear Colleague,    Thank you for referring your patient, Jenna Jhaveri, to the [facility] [place] at [facility]. Please see a copy of my visit note below.      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  She was diagnosed by us in 2021.  She was treated with IVIG initially and responded very well. IVIG course was completed in 2021. She was also treated initially with high dose of prednisone up to 50 mg daily that was gradually tapered.  She has stopped the prednisone now. We also started her on azathioprine in late 2021.  She is currently on 100 mg b.i.d.  She has had labs to monitor for azathioprine complications in the last few months, which were all normal including AST, ALT (2022) and CBC last done on 2022, that I personally reviewed.  Last CK was done on 2022 and was low (44), indicating that her muscle disease is likely in remission.      Her strength is stable from the last visit. She can get up from a chair and walk around without assistance, and has no limitations raising arms overhead. However, she is limited walking because of chronic kyphoscoliosis and a remote history of spine fusion more than 20 years ago.  She had a recurrence of severe mid and lower back pain on 2021 leading to repeat x-ray that showed fractured fusion.  She was evaluated by one neurosurgeon and another spine specialists in [place]. She still experiences quiet severe pain in the right hip, groin and thigh that is sharp/electric  at time. Local heat and Tylenol were not enough and she was placed on hydrocodone/APAP that helps partially. Repeat MRIs of the thoracic and lumbar spine were ordered to assess for cauda equina/conus or thoracic cord compression but unfortunately visualization was poor due to metal artefact. CT myelogram was attempted but was unsuccessful due to bone-on-bone. She also developed urge incontinence of urination; she was referred to Urology but still has not seen them. The plan is for her to get a surgical revision of the fusion with removal of hardware in the next months. She had mild dysphagia in her last visit and we ordered a video swallow study, done 2/23/2022; fortunately it was normal.      She also has osteoporosis and I started her on alendronate 35 mg weekly on 8/2021.     She has mild dyspnea on exertion, unchanged from last visit.  Her pulmonary function tests done on 08/26/2021 showed quite significant restriction with FVC of 50% and FEV1 similar.  I have discussed this with her pulmonologist, Dr. Maciel, on the phone and it is felt that a lot of this is related to kyphoscoliosis.  She is using CPAP at night.  We did not recommend making any changes to it.     Virginia also reports bilateral hand tremor, right>left that causes difficulties with fine movements, writing, pouring coffee from a mug, etc. She is on propranolol. She has a new local Neurologist who is managing the problem.      Medications were reviewed and are as per [facility] record.       /85   Pulse 80   Resp 16   SpO2 95%      In summary, I think that Jenna is doing well with regard to her necrotizing myopathy. Strength has improved since treatment was started and CK  is normal to low. At this point I would recommend continuing on azathioprine monotherapy at 100 mg bid. We will check labs again in 3 months.    I concur with the plan for spine surgery and revision of the fractured fusion. She has a lot of neuropathic and back pain with  incomplete relief with hydrocodone. Higher doses of hydrocodone or stronger opioids may not be safe for her in the long term due to her restrictive respiratory dysfunction that likely is kyphoscoliosis related and requires oxygen during the day and PAP at night. Unfortunately preoperative imaging is limited for the reasons mentioned above.     I encouraged her to see Urology for her incontinence. I will see her in followup in 4 months or sooner if needed.     Essential tremor is outside my area of expertise. I would recommend following with her local neurologist for this issue. If management is not very successful I am happy to refer her for a second opinion with our Movement Disorder experts in the future.      Billing MDM level 4, moderate, based on 1) Problems assessed: Two stable chronic illnesses (autoimmune necrotizing myopathy, fractured spinal fusion, essential tremor) and 2) Risk: prescription drug management- on aazathioprine requiring monitoring for toxicity.       Keron Valencia MD

## 2022-03-30 NOTE — PROGRESS NOTES
Virginia is a 72 year old who is being evaluated via a billable video visit.      How would you like to obtain your AVS? MyChart  If the video visit is dropped, the invitation should be resent by: Send to e-mail at: [email or web address]  Will anyone else be joining your video visit? No      Video Start Time: 9.35 am  Video-Visit Details    Type of service:  Video Visit    Video End Time:9.48 am    Originating Location (pt. 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  She was diagnosed by us in 2021.  She was treated with IVIG initially and responded very well. IVIG course was completed in 2021. She was also treated initially with high dose of prednisone up to 50 mg daily that was gradually tapered.  She has stopped the prednisone now. We also started her on azathioprine in late 2021.  She is currently on 100 mg b.i.d.  She has had labs to monitor for azathioprine complications in the last few months, which were all normal including AST, ALT (2022) and CBC last done on 2022, that I personally reviewed.  Last CK was done on 2022 and was low (44), indicating that her muscle disease is likely in remission.      Her strength is stable from the last visit. She can get up from a chair and walk around without assistance, and has no limitations raising arms overhead. However, she is limited walking because of chronic kyphoscoliosis and a remote history of spine fusion more than 20 years ago.  She had a recurrence of severe mid and lower  back pain on 11/07/2021 leading to repeat x-ray that showed fractured fusion.  She was evaluated by one neurosurgeon and another spine specialists in [place]. She still experiences quiet severe pain in the right hip, groin and thigh that is sharp/electric at time. Local heat and Tylenol were not enough and she was placed on hydrocodone/APAP that helps partially. Repeat MRIs of the thoracic and lumbar spine were ordered to assess for cauda equina/conus or thoracic cord compression but unfortunately visualization was poor due to metal artefact. CT myelogram was attempted but was unsuccessful due to bone-on-bone. She also developed urge incontinence of urination; she was referred to Urology but still has not seen them. The plan is for her to get a surgical revision of the fusion with removal of hardware in the next months. She had mild dysphagia in her last visit and we ordered a video swallow study, done 2/23/2022; fortunately it was normal.      She also has osteoporosis and I started her on alendronate 35 mg weekly on 8/2021.     She has mild dyspnea on exertion, unchanged from last visit.  Her pulmonary function tests done on 08/26/2021 showed quite significant restriction with FVC of 50% and FEV1 similar.  I have discussed this with her pulmonologist, Dr. Maceil, on the phone and it is felt that a lot of this is related to kyphoscoliosis.  She is using CPAP at night.  We did not recommend making any changes to it.     Virginia also reports bilateral hand tremor, right>left that causes difficulties with fine movements, writing, pouring coffee from a mug, etc. She is on propranolol. She has a new local Neurologist who is managing the problem.      Medications were reviewed and are as per [facility] record.       /85   Pulse 80   Resp 16   SpO2 95%      In summary, I think that Jenna is doing well with regard to her necrotizing myopathy. Strength has improved since treatment was started and CK  is normal to  low. At this point I would recommend continuing on azathioprine monotherapy at 100 mg bid. We will check labs again in 3 months.    I concur with the plan for spine surgery and revision of the fractured fusion. She has a lot of neuropathic and back pain with incomplete relief with hydrocodone. Higher doses of hydrocodone or stronger opioids may not be safe for her in the long term due to her restrictive respiratory dysfunction that likely is kyphoscoliosis related and requires oxygen during the day and PAP at night. Unfortunately preoperative imaging is limited for the reasons mentioned above.     I encouraged her to see Urology for her incontinence. I will see her in followup in 4 months or sooner if needed.     Essential tremor is outside my area of expertise. I would recommend following with her local neurologist for this issue. If management is not very successful I am happy to refer her for a second opinion with our Movement Disorder experts in the future.      Billing MDM level 4, moderate, based on 1) Problems assessed: Two stable chronic illnesses (autoimmune necrotizing myopathy, fractured spinal fusion, essential tremor) and 2) Risk: prescription drug management- on aazathioprine requiring monitoring for toxicity.     Sincerely,      Keron Valencia MD

## 2022-04-13 DIAGNOSIS — G72.49 AUTOIMMUNE NECROTIZING MYOPATHY: ICD-10-CM

## 2022-04-14 RX ORDER — AZATHIOPRINE 50 MG/1
TABLET ORAL
Qty: 120 TABLET | Refills: 0 | Status: SHIPPED | OUTPATIENT
Start: 2022-04-14 | End: 2022-05-11

## 2022-05-11 DIAGNOSIS — G72.49 AUTOIMMUNE NECROTIZING MYOPATHY: ICD-10-CM

## 2022-05-11 RX ORDER — AZATHIOPRINE 50 MG/1
TABLET ORAL
Qty: 120 TABLET | Refills: 0 | Status: SHIPPED | OUTPATIENT
Start: 2022-05-11 | End: 2022-08-11

## 2022-06-11 ENCOUNTER — HEALTH MAINTENANCE LETTER (OUTPATIENT)
Age: 72
End: 2022-06-11

## 2022-06-28 ENCOUNTER — TRANSFERRED RECORDS (OUTPATIENT)
Dept: NEUROLOGY | Facility: CLINIC | Age: 72
End: 2022-06-28

## 2022-06-29 ENCOUNTER — TRANSFERRED RECORDS (OUTPATIENT)
Dept: NEUROLOGY | Facility: CLINIC | Age: 72
End: 2022-06-29

## 2022-07-06 LAB
CREATININE (EXTERNAL): 0.47 MG/DL (ref 0.57–1.11)
GFR ESTIMATED (EXTERNAL): >60 ML/MIN/1.73M(2)
GLUCOSE (EXTERNAL): 169 MG/DL (ref 70–100)
POTASSIUM (EXTERNAL): 4.1 MMOL/L (ref 3.5–5.1)

## 2022-07-07 LAB
CREATININE (EXTERNAL): 0.36 MG/DL (ref 0.57–1.11)
GFR ESTIMATED (EXTERNAL): >60 ML/MIN/1.73M(2)
GLUCOSE (EXTERNAL): 144 MG/DL (ref 70–100)
POTASSIUM (EXTERNAL): 4 MMOL/L (ref 3.5–5.1)
TRIGLYCERIDES (EXTERNAL): 179 MG/DL (ref 30–150)

## 2022-07-08 LAB
CREATININE (EXTERNAL): 0.45 MG/DL (ref 0.57–1.11)
GFR ESTIMATED (EXTERNAL): >60 ML/MIN/1.73M(2)
GLUCOSE (EXTERNAL): 104 MG/DL (ref 70–100)
POTASSIUM (EXTERNAL): 4.2 MMOL/L (ref 3.5–5.1)

## 2022-07-09 LAB
CREATININE (EXTERNAL): 0.44 MG/DL (ref 0.57–1.11)
GFR ESTIMATED (EXTERNAL): >60 ML/MIN/1.73M(2)
GLUCOSE (EXTERNAL): 141 MG/DL (ref 70–100)
POTASSIUM (EXTERNAL): 4.2 MMOL/L (ref 3.5–5.1)

## 2022-07-10 LAB
CREATININE (EXTERNAL): 0.44 MG/DL (ref 0.57–1.11)
GFR ESTIMATED (EXTERNAL): >60 ML/MIN/1.73M(2)
GLUCOSE (EXTERNAL): 107 MG/DL (ref 70–100)
POTASSIUM (EXTERNAL): 4.2 MMOL/L (ref 3.5–5.1)

## 2022-07-11 LAB
CREATININE (EXTERNAL): 0.45 MG/DL (ref 0.57–1.11)
GFR ESTIMATED (EXTERNAL): >60 ML/MIN/1.73M(2)
GLUCOSE (EXTERNAL): 102 MG/DL (ref 70–100)
POTASSIUM (EXTERNAL): 4.3 MMOL/L (ref 3.5–5.1)

## 2022-08-10 DIAGNOSIS — G72.49 AUTOIMMUNE NECROTIZING MYOPATHY: ICD-10-CM

## 2022-08-11 RX ORDER — AZATHIOPRINE 50 MG/1
TABLET ORAL
Qty: 120 TABLET | Refills: 2 | Status: SHIPPED | OUTPATIENT
Start: 2022-08-11 | End: 2022-11-11

## 2022-10-16 ENCOUNTER — HEALTH MAINTENANCE LETTER (OUTPATIENT)
Age: 72
End: 2022-10-16

## 2022-10-21 ENCOUNTER — TELEPHONE (OUTPATIENT)
Dept: NEUROLOGY | Facility: CLINIC | Age: 72
End: 2022-10-21

## 2022-10-21 DIAGNOSIS — G83.4 CAUDA EQUINA COMPRESSION (H): ICD-10-CM

## 2022-10-21 DIAGNOSIS — N39.46 MIXED INCONTINENCE: Primary | ICD-10-CM

## 2022-10-21 NOTE — TELEPHONE ENCOUNTER
Left message with Dr Saavedra's nurse regarding letter received. Dr Valencia will place a Urology referral-does not do bladder EMG's.  We can do regular EMG but would not be able to schedule in a timely fashion-Dr Saavedra may want to refer for EMG elsewhere. Dr Valencia does not believe the issues mentioned are related to muscle disease.  Contact number provided.

## 2022-10-25 NOTE — TELEPHONE ENCOUNTER
MEDICAL RECORDS REQUEST   [facility]  Urology Clinic  [address] [place] [address]  PHONE: [phone]  Fax: [phone]        FUTURE VISIT INFORMATION                                                   Jenna Jhaveri, : 1950 scheduled for future visit at [facility]    APPOINTMENT INFORMATION:    Date: 2022    Provider:  Charlotte Chu PA-C    Reason for Visit/Diagnosis: incontience    REFERRAL INFORMATION:    Referring provider:  Keron Valencia MD in [facility] NEUROLOGY    RECORDS REQUESTED FOR VISIT                                                     NOTES  STATUS/DETAILS   OFFICE NOTE from other specialist  yes, 2022 -- Staci Donahue MD @ [facility]   MEDICATION LIST  yes   LABS     URINALYSIS (UA)  yes     PRE-VISIT CHECKLIST      Record collection complete Yes   Appointment appropriately scheduled           (right time/right provider) Yes   Joint diagnostic appointment coordinated correctly          (ensure right order & amount of time) Yes   MyChart activation Yes   Questionnaire complete If no, please explain PENDING

## 2022-11-04 ENCOUNTER — PRE VISIT (OUTPATIENT)
Dept: UROLOGY | Facility: CLINIC | Age: 72
End: 2022-11-04

## 2022-11-10 DIAGNOSIS — G72.49 AUTOIMMUNE NECROTIZING MYOPATHY: ICD-10-CM

## 2022-11-11 RX ORDER — AZATHIOPRINE 50 MG/1
TABLET ORAL
Qty: 120 TABLET | Refills: 0 | Status: SHIPPED | OUTPATIENT
Start: 2022-11-11 | End: 2022-12-09

## 2022-11-14 ENCOUNTER — TELEPHONE (OUTPATIENT)
Dept: NEUROLOGY | Facility: CLINIC | Age: 72
End: 2022-11-14

## 2022-11-14 NOTE — TELEPHONE ENCOUNTER
[facility] Call Center    Phone Message    May a detailed message be left on voicemail: yes     Reason for Call:  Patient called to reschedule appt from 11/14 due to weather, patient asking if she should get labs done before her appointment in March.    Action Taken: Message routed to:  [facility] matthew neuro    Travel Screening: Not Applicable

## 2022-11-29 ENCOUNTER — PRE VISIT (OUTPATIENT)
Dept: UROLOGY | Facility: CLINIC | Age: 72
End: 2022-11-29

## 2022-11-29 NOTE — TELEPHONE ENCOUNTER
MEDICAL RECORDS REQUEST   [facility]  Urology Clinic  [address] [place] [address]  PHONE: [phone]  Fax: [phone]                                                    FUTURE VISIT INFORMATION                                                   Jenna Jhaveri, : 1950 scheduled for future visit at [facility]     APPOINTMENT INFORMATION:    Date: 2022    Provider:  Ivonne Shaikh MD    Reason for Visit/Diagnosis: incontience     REFERRAL INFORMATION:    Referring provider:  Keron Valencia MD in [facility]     RECORDS REQUESTED FOR VISIT                                                      NOTES   STATUS/DETAILS   OFFICE NOTE from other specialist   yes, 2022 -- Staci Donahue MD @ [facility]   MEDICATION LIST   yes   LABS       URINALYSIS (UA)   yes      PRE-VISIT CHECKLIST        Record collection complete Yes   Appointment appropriately scheduled           (right time/right provider) Yes   Joint diagnostic appointment coordinated correctly          (ensure right order & amount of time) Yes   MyChart activation Yes   Questionnaire complete If no, please explain PENDING          
no

## 2022-12-07 ENCOUNTER — PRE VISIT (OUTPATIENT)
Dept: UROLOGY | Facility: CLINIC | Age: 72
End: 2022-12-07

## 2022-12-07 NOTE — TELEPHONE ENCOUNTER
Reason for visit: Incontinence     Relevant information: neurogenic bladder, history of spinal surgery    Records/imaging/labs/orders: all records available    Pt called: no need for a call    At Rooming: collect a urine/pvr      Crystal Quiñones CMA  12/7/2022  9:23 AM

## 2022-12-08 ENCOUNTER — OFFICE VISIT (OUTPATIENT)
Dept: NEUROLOGY | Facility: CLINIC | Age: 72
End: 2022-12-08
Payer: COMMERCIAL

## 2022-12-08 ENCOUNTER — OFFICE VISIT (OUTPATIENT)
Dept: UROLOGY | Facility: CLINIC | Age: 72
End: 2022-12-08
Payer: COMMERCIAL

## 2022-12-08 ENCOUNTER — PRE VISIT (OUTPATIENT)
Dept: UROLOGY | Facility: CLINIC | Age: 72
End: 2022-12-08

## 2022-12-08 VITALS
HEIGHT: 64 IN | WEIGHT: 194 LBS | BODY MASS INDEX: 33.12 KG/M2 | DIASTOLIC BLOOD PRESSURE: 89 MMHG | SYSTOLIC BLOOD PRESSURE: 147 MMHG | HEART RATE: 105 BPM

## 2022-12-08 DIAGNOSIS — G83.4 CAUDA EQUINA COMPRESSION (H): ICD-10-CM

## 2022-12-08 DIAGNOSIS — Z87.828 HISTORY OF SPINAL CORD INJURY: ICD-10-CM

## 2022-12-08 DIAGNOSIS — M54.16 BILATERAL LUMBAR RADICULOPATHY: ICD-10-CM

## 2022-12-08 DIAGNOSIS — R94.131 MYOPATHIC CHANGES PRESENT ON ELECTROMYOGRAM (EMG): ICD-10-CM

## 2022-12-08 DIAGNOSIS — R15.9 BOWEL AND BLADDER INCONTINENCE: Primary | ICD-10-CM

## 2022-12-08 DIAGNOSIS — G72.49 AUTOIMMUNE NECROTIZING MYOPATHY: Primary | ICD-10-CM

## 2022-12-08 DIAGNOSIS — Z79.60 LONG-TERM USE OF IMMUNOSUPPRESSANT MEDICATION: ICD-10-CM

## 2022-12-08 DIAGNOSIS — R32 BOWEL AND BLADDER INCONTINENCE: Primary | ICD-10-CM

## 2022-12-08 DIAGNOSIS — Z98.890 S/P SPINAL SURGERY: ICD-10-CM

## 2022-12-08 DIAGNOSIS — N31.9 NEUROGENIC BLADDER: ICD-10-CM

## 2022-12-08 DIAGNOSIS — N39.46 MIXED INCONTINENCE: ICD-10-CM

## 2022-12-08 PROCEDURE — 95886 MUSC TEST DONE W/N TEST COMP: CPT | Performed by: PSYCHIATRY & NEUROLOGY

## 2022-12-08 PROCEDURE — 99203 OFFICE O/P NEW LOW 30 MIN: CPT | Mod: GC | Performed by: UROLOGY

## 2022-12-08 PROCEDURE — 95910 NRV CNDJ TEST 7-8 STUDIES: CPT | Performed by: PSYCHIATRY & NEUROLOGY

## 2022-12-08 PROCEDURE — 95885 MUSC TST DONE W/NERV TST LIM: CPT | Mod: 59 | Performed by: PSYCHIATRY & NEUROLOGY

## 2022-12-08 RX ORDER — GABAPENTIN 300 MG/1
300 CAPSULE ORAL 3 TIMES DAILY
COMMUNITY

## 2022-12-08 RX ORDER — BISACODYL 10 MG
10 SUPPOSITORY, RECTAL RECTAL DAILY PRN
COMMUNITY
End: 2023-09-22

## 2022-12-08 RX ORDER — ZINC SULFATE 50(220)MG
220 CAPSULE ORAL DAILY
COMMUNITY

## 2022-12-08 RX ORDER — ALBUTEROL SULFATE 90 UG/1
2 AEROSOL, METERED RESPIRATORY (INHALATION) EVERY 6 HOURS PRN
COMMUNITY

## 2022-12-08 RX ORDER — IPRATROPIUM BROMIDE 42 UG/1
2 SPRAY, METERED NASAL 3 TIMES DAILY PRN
Status: ON HOLD | COMMUNITY
End: 2023-10-09

## 2022-12-08 RX ORDER — ACETAMINOPHEN 500 MG
500-1000 TABLET ORAL EVERY 8 HOURS PRN
COMMUNITY
Start: 2022-05-31

## 2022-12-08 RX ORDER — FUROSEMIDE 20 MG
20 TABLET ORAL DAILY PRN
COMMUNITY

## 2022-12-08 ASSESSMENT — PAIN SCALES - GENERAL: PAINLEVEL: MODERATE PAIN (4)

## 2022-12-08 NOTE — LETTER
12/8/2022       RE: Jenna Jhaveri  [address] [address]     Dear Colleague,    Thank you for referring your patient, Jenna Jhaveri, to the [facility] UROLOGY CLINIC [place] at [facility]. Please see a copy of my visit note below.    December 8, 2022    Referring Provider: Keron Valencia MD  [id number]  [place] [address]    Primary Care Provider: Arsalan Blas    Assessment & Plan     Bowel and bladder incontinence  Mixed incontinence  Cauda equina compression (H)  Neurogenic bladder  History of spinal cord injury  Spinal cord injury s/p spinal surgery 5/2022 complicated by neurogenic bladder with complete loss of bowel and bladder sensation and incontinence. No sign of urinary retention at this time by PVR. She is about 7 months out from surgery, and would expect it to take years for nerve healing to see if there will be any long term improvement in function. Plan for urodynamic testing. Neurology to complete EMG later today per patient. Recommended stool bulking with fiber supplement OR morning enema rather than rectal suppository to help with constant fecal incontinence.    Follow up with urodynamic testing.     Galina Merritt MD   [facility] Resident, G[facility]      Addendum:    The patient was seen and evaluated with the resident.  The plan was formulated in conjunction with me and I agree with the note with changes made as necessary.    At this time we discussed that for her urinary incontinence in setting of recent back surgery that the only test I can offer to assess for bladder function is urodynamics testing.  We discussed how it is done and she is agreeable to returning to do this    For the bowel incontinence I discussed that I cannot do testing for this but suggested if the daily suppository is not working she could either try to bulk stools with fiber to see if it  helps OR given the lack of sensation try a daily enema that would clean out the stool in her colon for the day so that at least she wouldn't have to deal with as much fecal incontinence during the day    RTC for VUDS, sooner if needed    20 minutes were spent today on the date of the encounter in reviewing the EMR including her operative note and hospitalization from May, direct patient care including discussion of OTC treatments for her fecal incontinence, coordination of care, and documentation.    Ivonne Shaikh MD MPH  (she/her/hers)   of Urology  [facility]    HPI:  Jenna Jhaveri is a 72 year old female who presents for evaluation of her pelvic floor symptoms accompanied by her daughter.  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, scoliosis s/p multiple spinal surgeries, HTN, HLD, VICKI, restrictive lung disease complicated by chronic hypoxemic respiratory failure on home O2, psoriasis presenting for acute loss of urinary and bowel sensation and incontinence which started post-operatively s/p her most recent spinal surgery on 5/17/22 at [facility] (operative note from this date reviewed in CareEverywhere-osteotomy pedicle subtraction L4 instrumentation replacement T12-S1 pelvic fixation, posterior spine fusion L3-L5, instrumentation and bone graft that had a dural tear that required neurosurgery assistance for repair of complex CSF fistula with inclusion of a laminectomy and fat graft and placement of a subarachnoid drain).     She says that she has a history of scoliosis s/p spinal surgery 1997 and 1998. Then this year, she had broken bars and vertebra that rubbed off protective covering over spine. Surgeon told her that her nerves were being rubbed off. Prior to surgery, she would have urinary incontinence in morning only, but had no issues the rest of the day, and no fecal incontinence unless she had terrible diarrhea. After surgery, she  says she has no sensation of having to urinate or have bowel movement. Has lost sensation in her perineum and  buttock. Can still feel rectal pain. Oozes stool all day. There were times when her bladder wasn't emptying and had a catheter when she was in the nursing home post-operatively.     She says she has had a UTI from May to August.    Using layered diapers. Wants to go back to using regular panties. Has been trying rectal suppository in the morning, but has been having trouble placing them herself.  She has bowel and bladder incontinence without sensation    Has EMG scheduled later this day with neurology.     Lies down most of day. Mobility with walker. Lives with  and daughter.    Per the patient she is here today to assess her bladder function given the lack of sensation and urinary incontinence.      Health maintenance screening:  Colonoscopy: reports last one in 2019 or . Completes them q5 years    No past medical history on file.    No past surgical history on file.    Social History     Socioeconomic History     Marital status:      Spouse name: Not on file     Number of children: Not on file     Years of education: Not on file     Highest education level: Not on file   Occupational History     Not on file   Tobacco Use     Smoking status: Former     Types: Cigarettes     Quit date:      Years since quittin.9     Smokeless tobacco: Never   Substance and Sexual Activity     Alcohol use: Not on file     Drug use: Not on file     Sexual activity: Not on file   Other Topics Concern     Not on file   Social History Narrative     Not on file     Social Determinants of Health     Financial Resource Strain: Not on file   Food Insecurity: Not on file   Transportation Needs: Not on file   Physical Activity: Not on file   Stress: Not on file   Social Connections: Not on file   Intimate Partner Violence: Not on file   Housing Stability: Not on file       No family history on file.    ROS    Negative except as above.    Allergies   Allergen Reactions     Potassium Shortness Of Breath and Palpitations     IV Potassium     Statins [Hmg-Coa-R Inhibitors] Other (See Comments)     Statin induced myopathy     Latex Hives     NO REACTION BUT SCREENING SCORE OF 6       Current Outpatient Medications   Medication     albuterol (PROVENTIL) (2.5 MG/3ML) 0.083% neb solution     alendronate (FOSAMAX) 35 MG tablet     amLODIPine (NORVASC) 10 MG tablet     aspirin (ASA) 81 MG chewable tablet     atenolol (TENORMIN) 50 MG tablet     atorvastatin (LIPITOR) 20 MG tablet     azaTHIOprine (IMURAN) 50 MG tablet     azaTHIOprine (IMURAN) 50 MG tablet     baclofen (LIORESAL) 10 MG tablet     baclofen (LIORESAL) 10 MG tablet     Calcium-Vitamin D-Vitamin K 500-200-40 MG-UNT-MCG CHEW     cholecalciferol 50 MCG (2000 UT) tablet     cyclobenzaprine (FLEXERIL) 10 MG tablet     DULoxetine (CYMBALTA) 20 MG capsule     DULoxetine HCl 40 MG CPEP     fluocinonide (LIDEX) 0.05 % external solution     fluticasone (FLONASE) 50 MCG/ACT nasal spray     fluticasone-salmeterol (ADVAIR) 250-50 MCG/DOSE inhaler     HYDROcodone-acetaminophen (NORCO)  MG per tablet     HYDROcodone-acetaminophen (NORCO) 5-325 MG tablet     ibuprofen (ADVIL/MOTRIN) 600 MG tablet     Immune Globulin, Human, 30 GM/300ML SOLN     ipratropium - albuterol 0.5 mg/2.5 mg/3 mL (DUONEB) 0.5-2.5 (3) MG/3ML neb solution     loratadine (CLARITIN) 10 MG tablet     omeprazole (PRILOSEC) 20 MG DR capsule     omeprazole (PRILOSEC) 40 MG DR capsule     predniSONE (DELTASONE) 10 MG tablet     predniSONE (DELTASONE) 20 MG tablet     primidone (MYSOLINE) 50 MG tablet     Probiotic Product (PROBIOTIC-10 PO)     propranolol (INDERAL) 10 MG tablet     propranolol ER (INDERAL LA) 60 MG 24 hr capsule     sulfamethoxazole-trimethoprim (BACTRIM DS) 800-160 MG tablet     XARELTO ANTICOAGULANT 20 MG TABS tablet     No current facility-administered medications for this visit.   BP (!)  "147/89   Pulse 105   Ht 1.613 m (5' 3.5\")   Wt 88 kg (194 lb)   BMI 33.83 kg/m        Physical Exam  Constitutional:       General: She is not in acute distress.     Appearance: She is not ill-appearing.   HENT:      Head: Normocephalic.      Nose: No congestion or rhinorrhea.   Eyes:      Conjunctiva/sclera: Conjunctivae normal.   Pulmonary:      Effort: Pulmonary effort is normal.      Comments: On home O2  Skin:     General: Skin is dry.   Neurological:      Mental Status: She is alert.   Psychiatric:         Mood and Affect: Mood normal.         Behavior: Behavior normal.         Thought Content: Thought content normal.         Judgment: Judgment normal.       PVR: 59 ml    CC  Patient Care Team:  Arsalan Blas MD as PCP - General  Fili Dey MD as MD (Neurology)  Fili Dey MD as Assigned Neuroscience Provider  Charlotte Chu PA-C as Physician Assistant (Urology)  FILI DEY      "

## 2022-12-08 NOTE — PATIENT INSTRUCTIONS
Consider daily fiber supplement to bulk the stools or an enema to clean out the bowels to see if this helps the fecal incontinence    Please return for urodynamics testing (testing bladder function) and a follow-up with  to discuss results on the same day.    It was a pleasure meeting with you today.  Thank you for allowing me and my team the privilege of caring for you today.  YOU are the reason we are here, and I truly hope we provided you with the excellent service you deserve.  Please let us know if there is anything else we can do for you so that we can be sure you are leaving completely satisfied with your care experience.

## 2022-12-08 NOTE — PROGRESS NOTES
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 She underwent complex lumbar spine revision surgery due to broken previous fusion in 2022. She had a long postoperative course with sepsis, DVT, etc. She now has ongoing bilateral leg weakness, right>left and her urge incontinence of urination,which she had preoperatively to a mild degree, has gotten much worse to the point she needs depends. Examination shows hip flexion of 3- on the right, and 4- on left. Knee extension is fair on both sides but knee flexion is weak on the right (4-). Foot dorsiflexion is intact bilaterally. Ankle reflexes are absent. She also has mild weakness of neck flexion, deltoids and triceps bilaterally (4) but biceps strength is normal.   Query: lumbosacral radiculopathies/cauda equina syndrome, vs recurrent myopathy.     Techniques:    Motor and sensory conduction studies were done with surface recording electrodes. EMG was done with a concentric needle electrode.     Results:    Right fibular (TA) motor NCS was normal. Bilateral fibular (EDB) motor NCSs showed normal distal latencies and attenuated CMAP amplitudes, right>left. On the right, there was no conduction block with proximal stimulation, and conduction velocities were normal. Left tibial (AH) motor NCS showed normal distal latency, attenuated CMAP  amplitudes without segmental changes, and normal conduction velocity. Right tibial (AH) motor NCS showed no response. Bilateral sural and superficial peroneal antidromic sensory NCSs were normal. Left tibial F-wave latencies were prolonged.     Needle EMG showed 3+ fibs/PSWs at the right short head of biceps femoris and left medial gastrocnemius, 2+ at the right medial gastrocnemius and tibialis posterior, and 1+ at the right TA and gluteus medius. All other muscles sampled showed no abnormal spontaneous activity. Of note, the right medial gastrocnemius showed markedly decreased insertional activity suggestive of end stage muscle, and no voluntary MUPs could be recruited.   Severely reduced recruitment of large and/or long duration MUPs was noted at the right biceps femoris and tibialis posterior. Moderately reduced recruitment of large, long duration MUPs was noted at the right gluteus medius and left medial gastrocnemius. Mildly reduced recruitment of large, and polyphasic MUPs was noted at the right TA. All other muscles sampled showed normal recruitment patterns, but there were a few muscles with large, long duration, and occasionally polyphasic MUPs (right vastus lateralis, left vastus medius), others with excessively polyphasic MUPs of normal amplitude (right triceps and biceps) and others with small, short duration, polyphasic MUPs (right iliopsoas, possibly left TA). EMG of the right deltoid was normal.      Interpretation:    Abnormal study. There is electrodiagnostic evidence of 1) Asymmetric bilateral lumbosacral radiculopathies at L5 and S1. There is active denervation in right L5 and bilateral S1 levels, and the changes overall are more severe on the right (especially S1), in keeping with the clinical findings. There is also evidence of milder chronic bilateral L4 radiculopathies, based on motor unit remodeling seen in bilateral quadriceps muscles. 2) Mild myopathic changes in few proximal muscles, such  as the iliopsoas, biceps, and triceps, without irritability. The lack of membrane irritability in those muscles argues against active inflammatory/necrotizing myopathy. It is possible that those changes reflect deconditioning with disuse (type 2) atrophy of muscle. There is no electrodiagnostic evidence of a sensory polyneuropathy from this study. Clinical correlation is recommended.     ___________________________  Keron Valencia MD        Nerve Conduction Studies  Motor Sites      Latency Amplitude Neg. Amp Diff Segment Distance Velocity Neg. Dur Neg Area Diff Temperature Comment   Site (ms) Norm (mV) Norm %  cm m/s Norm ms %  C    Right Dp Branch Fibular (TA) Motor   Fib Head 3.7  < 6.0 2.1 -      8.5  29.2    Pop Fossa 5.7  < 5.7 1.66 - -21.0 Pop Fossa-Fib Head 10 50 - 8.5 -22.0 29.1    Left Fibular (EDB) Motor   Ankle 3.5  < 6.0 1.06  > 2.0  Ankle-EDB 8   5.7  31.3    Right Fibular (EDB) Motor   Ankle 3.8  < 6.0 0.76  > 2.0  Ankle-EDB 8   6.1  29.7    Bel Fib Head 12.2 - 0.47 - -38.2 Bel Fib Head-Ankle 38 45  > 38 4.9 -58.8 29.6    Pop Fossa 13.9 - 0.50 - 6.4 Pop Fossa-Bel Fib Head 8 47  > 38 5.4 0.76 29.6    Left Tibial (AHB) Motor   Ankle 3.3  < 6.5 2.8  > 4.4  Ankle-AHB 8   5.2  31.7    Knee 12.8 - 1.42 - -49.3 Knee-Ankle 42 44  > 38 5.8 -38.8 31.7    Right Tibial (AHB) Motor   Ankle NR  < 6.5 NR  > 4.4  Ankle-AHB 8   NR  29      Sensory Sites      Onset Lat Peak Lat Amp (O-P) Amp (P-P) Segment Distance Velocity Temperature Comment   Site ms ms  V Norm  V  cm m/s Norm  C    Left Superficial Fibular Sensory   14 cm-Ankle 2.6 3.6 4  > 3 3 14 cm-Ankle 12.5 48  > 38 31    Right Superficial Fibular Sensory   14 cm-Ankle 2.3 3.0 4  > 3 4 14 cm-Ankle 12.5 54  > 38 28.5    Left Sural Sensory   Calf-Lat Mall 2.6 3.4 9  > 5 12 Calf-Lat Mall 14 54  > 38 31    Right Sural Sensory   Calf-Lat Mall 2.2 2.9 11  > 5 11 Calf-Lat Mall 14 64  > 38 29.7      F Wave Studies     Min-F Max-F Dispersion Persistence Mean-F  F-Norm L-R Mean-F L-R Mean-F Norm F/M Ratio F-M Lat (ms)   Left Tibial (Abd Hallucis)  31.6  C   59.38 69.22 9.84 77.78 62.61 <61  <5.7 1.16 54.69       Electromyography     Side Muscle Ins Act Fibs/PSW Fasc HF Amp Dur Poly Recrt Int Pat   Right Tib Anterior Nml 1+ Nml 0 1+ Nml 2+ Della Nml   Right Gastroc MH Decr 2+ Nml 0    NO MUPs Nml   Right Tib Posterior Nml 2+ Nml 0 1+ 2+ 0 SevRed Nml   Right Vastus Lat Nml None Nml 0 1+ 1+ 2+ Nml Nml   Right Iliopsoas Nml None Nml 0 1- 1- 2+ Nml Nml   Left Tib Anterior Nml None Nml 0 Nml 1- 2+ Nml Nml   Left Gastroc MH Nml 3+ Nml 0 1+ 2+ 0 ModRed Nml   Left Vastus Med Nml None Nml 0 1+ 1+ 0 Nml Nml   Right Biceps Fem LH Nml 3+ Nml 0 Nml 1+ 0 SevRed Nml   Right Gluteus Med Nml 1+ Nml 0 1+ 2+ 0 ModRed Nml   Right Triceps Nml None Nml 0 Nml Nml 1+ Nml Nml   Right Deltoid Nml None Nml 0 Nml Nml 0 Nml Nml   Right Biceps Nml None Nml 0 Nml Nml 1+ Nml Nml         NCS Waveforms:    Motor                  Sensory                   Ultrasound Images:

## 2022-12-08 NOTE — PROGRESS NOTES
December 8, 2022    Referring Provider: Keron Valencia MD  [id number]  [place] [address]    Primary Care Provider: Arsalan Blas    Assessment & Plan     Bowel and bladder incontinence  Mixed incontinence  Cauda equina compression (H)  Neurogenic bladder  History of spinal cord injury  Spinal cord injury s/p spinal surgery 5/2022 complicated by neurogenic bladder with complete loss of bowel and bladder sensation and incontinence. No sign of urinary retention at this time by PVR. She is about 7 months out from surgery, and would expect it to take years for nerve healing to see if there will be any long term improvement in function. Plan for urodynamic testing. Neurology to complete EMG later today per patient. Recommended stool bulking with fiber supplement OR morning enema rather than rectal suppository to help with constant fecal incontinence.    Follow up with urodynamic testing.     Galina Merritt MD   [facility] Resident, G2      Addendum:    The patient was seen and evaluated with the resident.  The plan was formulated in conjunction with me and I agree with the note with changes made as necessary.    At this time we discussed that for her urinary incontinence in setting of recent back surgery that the only test I can offer to assess for bladder function is urodynamics testing.  We discussed how it is done and she is agreeable to returning to do this    For the bowel incontinence I discussed that I cannot do testing for this but suggested if the daily suppository is not working she could either try to bulk stools with fiber to see if it helps OR given the lack of sensation try a daily enema that would clean out the stool in her colon for the day so that at least she wouldn't have to deal with as much fecal incontinence during the day    RTC for VUDS, sooner if needed    20 minutes were spent today on the date of the encounter in reviewing the EMR  including her operative note and hospitalization from May, direct patient care including discussion of OTC treatments for her fecal incontinence, coordination of care, and documentation.    Ivonne Shaikh MD MPH  (she/her/hers)   of Urology  [facility]    HPI:  Jenna Jhaveri is a 72 year old female who presents for evaluation of her pelvic floor symptoms accompanied by her daughter.  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, scoliosis s/p multiple spinal surgeries, HTN, HLD, VICKI, restrictive lung disease complicated by chronic hypoxemic respiratory failure on home O2, psoriasis presenting for acute loss of urinary and bowel sensation and incontinence which started post-operatively s/p her most recent spinal surgery on 5/17/22 at [facility] (operative note from this date reviewed in CareEverywhere-osteotomy pedicle subtraction L4 instrumentation replacement T12-S1 pelvic fixation, posterior spine fusion L3-L5, instrumentation and bone graft that had a dural tear that required neurosurgery assistance for repair of complex CSF fistula with inclusion of a laminectomy and fat graft and placement of a subarachnoid drain).     She says that she has a history of scoliosis s/p spinal surgery 1997 and 1998. Then this year, she had broken bars and vertebra that rubbed off protective covering over spine. Surgeon told her that her nerves were being rubbed off. Prior to surgery, she would have urinary incontinence in morning only, but had no issues the rest of the day, and no fecal incontinence unless she had terrible diarrhea. After surgery, she says she has no sensation of having to urinate or have bowel movement. Has lost sensation in her perineum and  buttock. Can still feel rectal pain. Oozes stool all day. There were times when her bladder wasn't emptying and had a catheter when she was in the nursing home post-operatively.     She says she has had a  UTI from May to August.    Using layered diapers. Wants to go back to using regular panties. Has been trying rectal suppository in the morning, but has been having trouble placing them herself.  She has bowel and bladder incontinence without sensation    Has EMG scheduled later this day with neurology.     Lies down most of day. Mobility with walker. Lives with  and daughter.    Per the patient she is here today to assess her bladder function given the lack of sensation and urinary incontinence.      Health maintenance screening:  Colonoscopy: reports last one in 2019 or . Completes them q5 years    No past medical history on file.    No past surgical history on file.    Social History     Socioeconomic History     Marital status:      Spouse name: Not on file     Number of children: Not on file     Years of education: Not on file     Highest education level: Not on file   Occupational History     Not on file   Tobacco Use     Smoking status: Former     Types: Cigarettes     Quit date:      Years since quittin.9     Smokeless tobacco: Never   Substance and Sexual Activity     Alcohol use: Not on file     Drug use: Not on file     Sexual activity: Not on file   Other Topics Concern     Not on file   Social History Narrative     Not on file     Social Determinants of Health     Financial Resource Strain: Not on file   Food Insecurity: Not on file   Transportation Needs: Not on file   Physical Activity: Not on file   Stress: Not on file   Social Connections: Not on file   Intimate Partner Violence: Not on file   Housing Stability: Not on file       No family history on file.    ROS   Negative except as above.    Allergies   Allergen Reactions     Potassium Shortness Of Breath and Palpitations     IV Potassium     Statins [Hmg-Coa-R Inhibitors] Other (See Comments)     Statin induced myopathy     Latex Hives     NO REACTION BUT SCREENING SCORE OF 6       Current Outpatient Medications  "  Medication     albuterol (PROVENTIL) (2.5 MG/3ML) 0.083% neb solution     alendronate (FOSAMAX) 35 MG tablet     amLODIPine (NORVASC) 10 MG tablet     aspirin (ASA) 81 MG chewable tablet     atenolol (TENORMIN) 50 MG tablet     atorvastatin (LIPITOR) 20 MG tablet     azaTHIOprine (IMURAN) 50 MG tablet     azaTHIOprine (IMURAN) 50 MG tablet     baclofen (LIORESAL) 10 MG tablet     baclofen (LIORESAL) 10 MG tablet     Calcium-Vitamin D-Vitamin K 500-200-40 MG-UNT-MCG CHEW     cholecalciferol 50 MCG (2000 UT) tablet     cyclobenzaprine (FLEXERIL) 10 MG tablet     DULoxetine (CYMBALTA) 20 MG capsule     DULoxetine HCl 40 MG CPEP     fluocinonide (LIDEX) 0.05 % external solution     fluticasone (FLONASE) 50 MCG/ACT nasal spray     fluticasone-salmeterol (ADVAIR) 250-50 MCG/DOSE inhaler     HYDROcodone-acetaminophen (NORCO)  MG per tablet     HYDROcodone-acetaminophen (NORCO) 5-325 MG tablet     ibuprofen (ADVIL/MOTRIN) 600 MG tablet     Immune Globulin, Human, 30 GM/300ML SOLN     ipratropium - albuterol 0.5 mg/2.5 mg/3 mL (DUONEB) 0.5-2.5 (3) MG/3ML neb solution     loratadine (CLARITIN) 10 MG tablet     omeprazole (PRILOSEC) 20 MG DR capsule     omeprazole (PRILOSEC) 40 MG DR capsule     predniSONE (DELTASONE) 10 MG tablet     predniSONE (DELTASONE) 20 MG tablet     primidone (MYSOLINE) 50 MG tablet     Probiotic Product (PROBIOTIC-10 PO)     propranolol (INDERAL) 10 MG tablet     propranolol ER (INDERAL LA) 60 MG 24 hr capsule     sulfamethoxazole-trimethoprim (BACTRIM DS) 800-160 MG tablet     XARELTO ANTICOAGULANT 20 MG TABS tablet     No current facility-administered medications for this visit.   BP (!) 147/89   Pulse 105   Ht 1.613 m (5' 3.5\")   Wt 88 kg (194 lb)   BMI 33.83 kg/m        Physical Exam  Constitutional:       General: She is not in acute distress.     Appearance: She is not ill-appearing.   HENT:      Head: Normocephalic.      Nose: No congestion or rhinorrhea.   Eyes:      " Conjunctiva/sclera: Conjunctivae normal.   Pulmonary:      Effort: Pulmonary effort is normal.      Comments: On home O2  Skin:     General: Skin is dry.   Neurological:      Mental Status: She is alert.   Psychiatric:         Mood and Affect: Mood normal.         Behavior: Behavior normal.         Thought Content: Thought content normal.         Judgment: Judgment normal.       PVR: 59 ml    CC  Patient Care Team:  Arsalan Blas MD as PCP - General  Fili Dey MD as MD (Neurology)  Fili Dey MD as Assigned Neuroscience Provider  Charlotte Chu PA-C as Physician Assistant (Urology)  FILI DEY

## 2022-12-08 NOTE — LETTER
2022       RE: Jenna Jhaveri  [address] [place] [id number]     Dear Colleague,    Thank you for referring your patient, Jenna Jhaveri, to the [facility] EMG [facility] [place] at [facility]. Please see a copy of my visit note below.                        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 She underwent complex lumbar spine revision surgery due to broken previous fusion in 2022. She had a long postoperative course with sepsis, DVT, etc. She now has ongoing bilateral leg weakness, right>left and her urge incontinence of urination,which she had preoperatively to a mild degree, has gotten much worse to the point she needs depends. Examination shows hip flexion of 3- on the right, and 4- on left. Knee extension is fair on both sides but knee flexion is weak on the right (4-). Foot dorsiflexion is intact bilaterally. Ankle reflexes are absent. She also has mild weakness of neck flexion, deltoids and triceps bilaterally (4) but biceps strength is normal.   Query: lumbosacral radiculopathies/cauda equina syndrome, vs recurrent myopathy.     Techniques:    Motor and sensory conduction studies were done with surface recording electrodes. EMG was done with a concentric needle electrode.     Results:    Right fibular (TA) motor  NCS was normal. Bilateral fibular (EDB) motor NCSs showed normal distal latencies and attenuated CMAP amplitudes, right>left. On the right, there was no conduction block with proximal stimulation, and conduction velocities were normal. Left tibial (AH) motor NCS showed normal distal latency, attenuated CMAP amplitudes without segmental changes, and normal conduction velocity. Right tibial (AH) motor NCS showed no response. Bilateral sural and superficial peroneal antidromic sensory NCSs were normal. Left tibial F-wave latencies were prolonged.     Needle EMG showed 3+ fibs/PSWs at the right short head of biceps femoris and left medial gastrocnemius, 2+ at the right medial gastrocnemius and tibialis posterior, and 1+ at the right TA and gluteus medius. All other muscles sampled showed no abnormal spontaneous activity. Of note, the right medial gastrocnemius showed markedly decreased insertional activity suggestive of end stage muscle, and no voluntary MUPs could be recruited.   Severely reduced recruitment of large and/or long duration MUPs was noted at the right biceps femoris and tibialis posterior. Moderately reduced recruitment of large, long duration MUPs was noted at the right gluteus medius and left medial gastrocnemius. Mildly reduced recruitment of large, and polyphasic MUPs was noted at the right TA. All other muscles sampled showed normal recruitment patterns, but there were a few muscles with large, long duration, and occasionally polyphasic MUPs (right vastus lateralis, left vastus medius), others with excessively polyphasic MUPs of normal amplitude (right triceps and biceps) and others with small, short duration, polyphasic MUPs (right iliopsoas, possibly left TA). EMG of the right deltoid was normal.      Interpretation:    Abnormal study. There is electrodiagnostic evidence of 1) Asymmetric bilateral lumbosacral radiculopathies at L5 and S1. There is active denervation in right L5 and bilateral S1  levels, and the changes overall are more severe on the right (especially S1), in keeping with the clinical findings. There is also evidence of milder chronic bilateral L4 radiculopathies, based on motor unit remodeling seen in bilateral quadriceps muscles. 2) Mild myopathic changes in few proximal muscles, such as the iliopsoas, biceps, and triceps, without irritability. The lack of membrane irritability in those muscles argues against active inflammatory/necrotizing myopathy. It is possible that those changes reflect deconditioning with disuse (type 2) atrophy of muscle. There is no electrodiagnostic evidence of a sensory polyneuropathy from this study. Clinical correlation is recommended.     ___________________________  Keron Valencia MD        Nerve Conduction Studies  Motor Sites      Latency Amplitude Neg. Amp Diff Segment Distance Velocity Neg. Dur Neg Area Diff Temperature Comment   Site (ms) Norm (mV) Norm %  cm m/s Norm ms %  C    Right Dp Branch Fibular (TA) Motor   Fib Head 3.7  < 6.0 2.1 -      8.5  29.2    Pop Fossa 5.7  < 5.7 1.66 - -21.0 Pop Fossa-Fib Head 10 50 - 8.5 -22.0 29.1    Left Fibular (EDB) Motor   Ankle 3.5  < 6.0 1.06  > 2.0  Ankle-EDB 8   5.7  31.3    Right Fibular (EDB) Motor   Ankle 3.8  < 6.0 0.76  > 2.0  Ankle-EDB 8   6.1  29.7    Bel Fib Head 12.2 - 0.47 - -38.2 Bel Fib Head-Ankle 38 45  > 38 4.9 -58.8 29.6    Pop Fossa 13.9 - 0.50 - 6.4 Pop Fossa-Bel Fib Head 8 47  > 38 5.4 0.76 29.6    Left Tibial (AHB) Motor   Ankle 3.3  < 6.5 2.8  > 4.4  Ankle-AHB 8   5.2  31.7    Knee 12.8 - 1.42 - -49.3 Knee-Ankle 42 44  > 38 5.8 -38.8 31.7    Right Tibial (AHB) Motor   Ankle NR  < 6.5 NR  > 4.4  Ankle-AHB 8   NR  29      Sensory Sites      Onset Lat Peak Lat Amp (O-P) Amp (P-P) Segment Distance Velocity Temperature Comment   Site ms ms  V Norm  V  cm m/s Norm  C    Left Superficial Fibular Sensory   14 cm-Ankle 2.6 3.6 4  > 3 3 14 cm-Ankle 12.5 48  > 38 31    Right Superficial  Fibular Sensory   14 cm-Ankle 2.3 3.0 4  > 3 4 14 cm-Ankle 12.5 54  > 38 28.5    Left Sural Sensory   Calf-Lat Mall 2.6 3.4 9  > 5 12 Calf-Lat Mall 14 54  > 38 31    Right Sural Sensory   Calf-Lat Mall 2.2 2.9 11  > 5 11 Calf-Lat Mall 14 64  > 38 29.7      F Wave Studies     Min-F Max-F Dispersion Persistence Mean-F F-Norm L-R Mean-F L-R Mean-F Norm F/M Ratio F-M Lat (ms)   Left Tibial (Abd Hallucis)  31.6  C   59.38 69.22 9.84 77.78 62.61 <61  <5.7 1.16 54.69       Electromyography     Side Muscle Ins Act Fibs/PSW Fasc HF Amp Dur Poly Recrt Int Pat   Right Tib Anterior Nml 1+ Nml 0 1+ Nml 2+ Della Nml   Right Gastroc MH Decr 2+ Nml 0    NO MUPs Nml   Right Tib Posterior Nml 2+ Nml 0 1+ 2+ 0 SevRed Nml   Right Vastus Lat Nml None Nml 0 1+ 1+ 2+ Nml Nml   Right Iliopsoas Nml None Nml 0 1- 1- 2+ Nml Nml   Left Tib Anterior Nml None Nml 0 Nml 1- 2+ Nml Nml   Left Gastroc MH Nml 3+ Nml 0 1+ 2+ 0 ModRed Nml   Left Vastus Med Nml None Nml 0 1+ 1+ 0 Nml Nml   Right Biceps Fem LH Nml 3+ Nml 0 Nml 1+ 0 SevRed Nml   Right Gluteus Med Nml 1+ Nml 0 1+ 2+ 0 ModRed Nml   Right Triceps Nml None Nml 0 Nml Nml 1+ Nml Nml   Right Deltoid Nml None Nml 0 Nml Nml 0 Nml Nml   Right Biceps Nml None Nml 0 Nml Nml 1+ Nml Nml         NCS Waveforms:    Motor                  Sensory                   Ultrasound Images:        Sincerely,    Keron Valencia MD

## 2022-12-09 DIAGNOSIS — G72.49 AUTOIMMUNE NECROTIZING MYOPATHY: ICD-10-CM

## 2022-12-09 RX ORDER — AZATHIOPRINE 50 MG/1
TABLET ORAL
Qty: 120 TABLET | Refills: 1 | Status: SHIPPED | OUTPATIENT
Start: 2022-12-09 | End: 2023-02-13

## 2023-01-06 ENCOUNTER — PRE VISIT (OUTPATIENT)
Dept: UROLOGY | Facility: CLINIC | Age: 73
End: 2023-01-06

## 2023-01-25 ENCOUNTER — TELEPHONE (OUTPATIENT)
Dept: UROLOGY | Facility: CLINIC | Age: 73
End: 2023-01-25

## 2023-01-25 ENCOUNTER — TELEPHONE (OUTPATIENT)
Dept: UROLOGY | Facility: CLINIC | Age: 73
End: 2023-01-25
Payer: COMMERCIAL

## 2023-01-25 NOTE — TELEPHONE ENCOUNTER
Voicemail left for patient today.  Per Dr. Shaikh and Charlotte Chu PA-C, would be best to postpone Urodynamics testing until at least a month after spinal surgery.  Appointment for Urodynamics and follow up with Dr. Shaikh on 2/2 have been cancelled.  Patient is to call back with any questions or concerns.      Jessica Cleary, CMA

## 2023-01-25 NOTE — TELEPHONE ENCOUNTER
[facility] Call Center    Phone Message    May a detailed message be left on voicemail: yes     Reason for Call: Patient called to reschedule her urodynamics test for after she has surgery so anytime March 8th or after. She wants to see Dr. Shaikh right after the procedure to go over the findings. Writer wasn't able to see any appts for Dr. Shaikh until May. Please reach out to schedule and advise. Thank you.    Action Taken: Message routed to:  [facility] ([facility]): Urology    Travel Screening: Not Applicable

## 2023-02-11 DIAGNOSIS — G72.49 AUTOIMMUNE NECROTIZING MYOPATHY: ICD-10-CM

## 2023-02-13 RX ORDER — AZATHIOPRINE 50 MG/1
TABLET ORAL
Qty: 120 TABLET | Refills: 0 | Status: SHIPPED | OUTPATIENT
Start: 2023-02-13 | End: 2023-03-13

## 2023-03-06 ENCOUNTER — TELEPHONE (OUTPATIENT)
Dept: UROLOGY | Facility: CLINIC | Age: 73
End: 2023-03-06
Payer: COMMERCIAL

## 2023-03-06 DIAGNOSIS — R32 BOWEL AND BLADDER INCONTINENCE: Primary | ICD-10-CM

## 2023-03-06 DIAGNOSIS — N39.46 MIXED INCONTINENCE: ICD-10-CM

## 2023-03-06 DIAGNOSIS — R15.9 BOWEL AND BLADDER INCONTINENCE: Primary | ICD-10-CM

## 2023-03-06 NOTE — TELEPHONE ENCOUNTER
Detailed voicemail left for Tobias at [facility].  Patient is scheduled for Urodynamics testing with Charlotte Chu PA-C on 3/16.  Patient has a hx of UTI's, and currently is bowel and bladder incontinent and is unable to leave a urine sample.  I would like to have her [facility] nurse collect a catheterized urine specimen from patient by this Friday at the very latest to rule out UTI.  I asked Tobias to please call back and let me know how to get this arranged.  Phone number to the Urology department provided.    Jessica Cleary, [facility]

## 2023-03-06 NOTE — TELEPHONE ENCOUNTER
Tobias called back. Writer spoke to Tobias and informed to obtain urine collection by Friday for UDS next week. Writer faxed order to [phone], they will fax back results to us at [phone]

## 2023-03-10 ENCOUNTER — TRANSFERRED RECORDS (OUTPATIENT)
Dept: HEALTH INFORMATION MANAGEMENT | Facility: CLINIC | Age: 73
End: 2023-03-10
Payer: COMMERCIAL

## 2023-03-13 ENCOUNTER — TELEPHONE (OUTPATIENT)
Dept: UROLOGY | Facility: CLINIC | Age: 73
End: 2023-03-13
Payer: COMMERCIAL

## 2023-03-13 DIAGNOSIS — G72.49 AUTOIMMUNE NECROTIZING MYOPATHY: ICD-10-CM

## 2023-03-13 DIAGNOSIS — N39.0 URINARY TRACT INFECTION: Primary | ICD-10-CM

## 2023-03-13 RX ORDER — SULFAMETHOXAZOLE/TRIMETHOPRIM 800-160 MG
1 TABLET ORAL 2 TIMES DAILY
Qty: 14 TABLET | Refills: 0 | Status: SHIPPED | OUTPATIENT
Start: 2023-03-13 | End: 2023-03-20

## 2023-03-13 RX ORDER — AZATHIOPRINE 50 MG/1
TABLET ORAL
Qty: 120 TABLET | Refills: 0 | Status: SHIPPED | OUTPATIENT
Start: 2023-03-13 | End: 2023-04-14

## 2023-03-13 NOTE — TELEPHONE ENCOUNTER
Spoke with patient today.  Patients recent urine culture came back positive for UTI.  Per Kendy Camara CNP ok to Rx Bactrim DS BID x 7 days.  Orders placed and sent to pharmacy requested.    Jessica Cleary, CMA

## 2023-03-15 DIAGNOSIS — G72.49 AUTOIMMUNE NECROTIZING MYOPATHY: Primary | ICD-10-CM

## 2023-03-16 ENCOUNTER — TELEPHONE (OUTPATIENT)
Dept: UROLOGY | Facility: CLINIC | Age: 73
End: 2023-03-16

## 2023-03-16 NOTE — TELEPHONE ENCOUNTER
[facility] Call Center    Phone Message    May a detailed message be left on voicemail: yes     Reason for Call: Patient called to reschedule her 3/16/23 UDS and follow up with Dr. Shaikh due to weather. Writer able to reschedule UDS, but not able to find an appt with Dr. Shaikh until June. Patient wondering if a video visit could be done for results sooner than June. Please reach out. Thank you    Action Taken: Message routed to:  [facility] URO    Travel Screening: Not Applicable

## 2023-03-20 ENCOUNTER — OFFICE VISIT (OUTPATIENT)
Dept: NEUROLOGY | Facility: CLINIC | Age: 73
End: 2023-03-20
Payer: COMMERCIAL

## 2023-03-20 ENCOUNTER — LAB (OUTPATIENT)
Dept: LAB | Facility: CLINIC | Age: 73
End: 2023-03-20
Payer: COMMERCIAL

## 2023-03-20 VITALS
OXYGEN SATURATION: 95 % | DIASTOLIC BLOOD PRESSURE: 81 MMHG | RESPIRATION RATE: 16 BRPM | HEART RATE: 88 BPM | SYSTOLIC BLOOD PRESSURE: 124 MMHG

## 2023-03-20 DIAGNOSIS — Z79.60 LONG-TERM USE OF IMMUNOSUPPRESSANT MEDICATION: ICD-10-CM

## 2023-03-20 DIAGNOSIS — R29.898 LEFT ARM WEAKNESS: ICD-10-CM

## 2023-03-20 DIAGNOSIS — G83.4 CAUDA EQUINA SYNDROME (H): ICD-10-CM

## 2023-03-20 DIAGNOSIS — G72.49 AUTOIMMUNE NECROTIZING MYOPATHY: Primary | ICD-10-CM

## 2023-03-20 DIAGNOSIS — G72.49 AUTOIMMUNE NECROTIZING MYOPATHY: ICD-10-CM

## 2023-03-20 LAB
ALT SERPL W P-5'-P-CCNC: 14 U/L (ref 10–35)
AST SERPL W P-5'-P-CCNC: 19 U/L (ref 10–35)
BASOPHILS # BLD AUTO: 0 10E3/UL (ref 0–0.2)
BASOPHILS NFR BLD AUTO: 1 %
CK SERPL-CCNC: 29 U/L (ref 26–192)
EOSINOPHIL # BLD AUTO: 0.3 10E3/UL (ref 0–0.7)
EOSINOPHIL NFR BLD AUTO: 5 %
ERYTHROCYTE [DISTWIDTH] IN BLOOD BY AUTOMATED COUNT: 16.4 % (ref 10–15)
HCT VFR BLD AUTO: 37.2 % (ref 35–47)
HGB BLD-MCNC: 12.6 G/DL (ref 11.7–15.7)
IMM GRANULOCYTES # BLD: 0 10E3/UL
IMM GRANULOCYTES NFR BLD: 0 %
LYMPHOCYTES # BLD AUTO: 1 10E3/UL (ref 0.8–5.3)
LYMPHOCYTES NFR BLD AUTO: 19 %
MCH RBC QN AUTO: 35.5 PG (ref 26.5–33)
MCHC RBC AUTO-ENTMCNC: 33.9 G/DL (ref 31.5–36.5)
MCV RBC AUTO: 105 FL (ref 78–100)
MONOCYTES # BLD AUTO: 0.5 10E3/UL (ref 0–1.3)
MONOCYTES NFR BLD AUTO: 9 %
NEUTROPHILS # BLD AUTO: 3.3 10E3/UL (ref 1.6–8.3)
NEUTROPHILS NFR BLD AUTO: 66 %
NRBC # BLD AUTO: 0 10E3/UL
NRBC BLD AUTO-RTO: 0 /100
PLATELET # BLD AUTO: 276 10E3/UL (ref 150–450)
RBC # BLD AUTO: 3.55 10E6/UL (ref 3.8–5.2)
WBC # BLD AUTO: 5 10E3/UL (ref 4–11)

## 2023-03-20 PROCEDURE — 85025 COMPLETE CBC W/AUTO DIFF WBC: CPT | Performed by: PATHOLOGY

## 2023-03-20 PROCEDURE — 84460 ALANINE AMINO (ALT) (SGPT): CPT | Performed by: PATHOLOGY

## 2023-03-20 PROCEDURE — 36415 COLL VENOUS BLD VENIPUNCTURE: CPT | Performed by: PATHOLOGY

## 2023-03-20 PROCEDURE — 82550 ASSAY OF CK (CPK): CPT | Performed by: PATHOLOGY

## 2023-03-20 PROCEDURE — 99214 OFFICE O/P EST MOD 30 MIN: CPT | Performed by: PSYCHIATRY & NEUROLOGY

## 2023-03-20 PROCEDURE — 84450 TRANSFERASE (AST) (SGOT): CPT | Performed by: PATHOLOGY

## 2023-03-20 RX ORDER — METHOCARBAMOL 500 MG/1
500 TABLET, FILM COATED ORAL 3 TIMES DAILY PRN
COMMUNITY
Start: 2023-03-15

## 2023-03-20 RX ORDER — OXYCODONE HYDROCHLORIDE 5 MG/1
5-10 TABLET ORAL
COMMUNITY
Start: 2023-02-15 | End: 2023-09-22

## 2023-03-20 RX ORDER — KETOCONAZOLE 20 MG/ML
SHAMPOO TOPICAL
COMMUNITY
Start: 2023-01-11 | End: 2023-09-22

## 2023-03-20 ASSESSMENT — PAIN SCALES - GENERAL: PAINLEVEL: SEVERE PAIN (6)

## 2023-03-20 NOTE — LETTER
3/20/2023       RE: Jenna Jhaveri  [address] [address]     Dear Colleague,    Thank you for referring your patient, Jenna Jhaveri, to the [facility]UROLOG[facility] [place] at [facility]. Please see a copy of my visit note below.    Arsalan Blas MD should be cc'ed    [place], March 20, 2023    Dear Dr. Blas,    I had the pleasure to see [place] Jhaveri in follow-up at the [facility] neuromuscular clinic today.  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  She was treated in the past with IVIG and high-dose corticosteroids.  Since August 2021 she is on azathioprine, and now she is given monotherapy at 100 mg twice daily.  Last CBC was done on February 2023 and showed expected macrocytosis with .  However, white cell count was normal at 5.5 and platelet count was normal.  Last CK on record was in December 2022 and it was low at 22.     In addition to the myopathy, Virginia has a complex history of lumbar spine injury.  She had a previous fusion many years ago for scoliosis, which was complicated by pseudarthrosis and flatback syndrome. There was possibly bone fracture at the site of fusion at L2, which required revision surgery done last year in 5/17/2022. The surgery was an elective  OSTEOTOMY PEDICLE SUBTRACTION L4, INSTRUMENTATION REPLACEMENT T12-S1,PELVIC FIXATION, POSTERIOR SPINE FUSION L3-L5, INSTRUMENTATION AND BONE GRAFT, DURAL REPAIR AND DRAIN PLACEMENT. During the procedure a big dural tear was discovered with exposure of the arachnoid and cauda equina roots. Unfortunately, after the revision she was found with bilateral right more than left leg pain and weakness.  She still has numbness in the entire right leg from the buttock to the thigh and calf, and pain that feels like a toothache.  She is taking gabapentin and Cymbalta for the  pain, with incomplete relief.  She recently started a supplement for nerve pain that I was not aware of, which was recommended by her daughter.  I asked the patient to investigate the name of the supplement and let me know.  In February 2023 she had to undergo repeat surgery, for reinstrumentation at right L5-S1.     Compared to the last time I saw her in the EMG lab in December 2022, Virginia does not complain of any proximal arm weakness.  She has no trouble raising the arms overhead.  Her  strength remains normal.  She has no dysphagia dysarthria sialorrhea.  Due to her kyphoscoliosis, she had developed a restrictive respiratory dysfunction many years ago, and she is chronically on oxygen.  Her leg strength has not changed significantly compared to December 2022.     They report of the EMG I did on December 8, 2022 is attached below:    INTERPRETATION: Abnormal study. There is electrodiagnostic evidence of 1) Asymmetric bilateral lumbosacral radiculopathies at L5 and S1. There is active denervation in right L5 and bilateral S1 levels, and the changes overall are more severe on the right (especially S1), in keeping with the clinical findings. There is also evidence of milder chronic bilateral L4 radiculopathies, based on motor unit remodeling seen in bilateral quadriceps muscles. 2) Mild myopathic changes in few proximal muscles, such as the iliopsoas, biceps, and triceps, without irritability. The lack of membrane irritability in those muscles argues against active inflammatory/necrotizing myopathy. It is possible that those changes reflect deconditioning with disuse (type 2) atrophy of muscle. There is no electrodiagnostic evidence of a sensory polyneuropathy from this study. Clinical correlation is recommended.      /81   Pulse 88   Resp 16   SpO2 95%     On exam today, Jenna is in good spirits.  She has no facial weakness or ptosis or ophthalmoparesis.  Neck flexion strength is normal.  Her  strength is 5/5 for bilateral deltoids, biceps, wrist extensors, and hand .  Triceps are 5 on the right but 4 - on the left, notably asymmetric.  There is also mild weakness of left wrist flexors at 4/5, whereas they are normal in the right.  FDI and APB strength is 4 bilaterally.  Strength for hip flexion is 3 - on the right and 4+ on the left.  Knee extension is bilaterally 5.  Knee flexion is right 4 left 5, and foot dorsiflexion is right 4 left 5.  Reflex are absent at the ankles, trace to 1+ at the knees, and brisk 2-3+ and symmetric at the upper extremities.    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  We will recheck CK today as well as CBC, AST, ALT, given chronic azathioprine use.  If CK is normal or low, we will taper azathioprine to 150 mg daily for the next 3 months, then 100 mg daily.  She has been evaluated by dermatology already and she should continue annual screening.    The second problem is severe lumbosacral radiculopathies, right more than left, which is likely due to cauda equina nerve injury that occurred either shortly prior to or during her revision surgery last year.  She has loss of perineal sensation and incontinence, for which she will see urology in March 12.  I told the patient that nerve regrowth may occur after this type of events, but the degree of recovery is unpredictable, and it may take a very long time.  She should continue her current pain management and PT.  I do not have any additional recommendations to provide for that problem.    Thirdly, she has asymmetric triceps and wrist flexor weakness, affecting only the left side.  This is not likely to be myopathy related, but could represent left C7 radiculopathy.  Unfortunately, I did not test her left upper extremity in the last EMG.  This problem does require further investigation and I will order another EMG, and try to do it on April  12 when she will come again to [place], for her urology appointment.    I will see her in follow-up in 6 months or sooner if needed.  Billing MDM level 4 (moderate) due to: #1 problems assessed: 2 or more stable conditions (autoimmune necrotizing myopathy, cauda equina nerve injury, possible left C7 radiculopathy) and #2 amount/complexity: ordering 3 unique tests today, and #3 risk: Prescription drug management.    Sincerely,    Keron Valencia MD, FAAN

## 2023-03-20 NOTE — PATIENT INSTRUCTIONS
Labs today. If they look good we will start cutting down on the azathioprine dose.    Follow up 6 months    We will have to give it quite a lot of time for the nerve damage in your legs to recover.     We will try to get an EMG of the LEFT arm, scheduled on 4/12 when you come for Urology appt

## 2023-03-20 NOTE — PROGRESS NOTES
Arsalan Blas MD should be cc'ed    [place], March 20, 2023    Dear Dr. Blas,    I had the pleasure to see Virginia Jhaveri in follow-up at the [facility] neuromuscular clinic today.  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  She was treated in the past with IVIG and high-dose corticosteroids.  Since August 2021 she is on azathioprine, and now she is given monotherapy at 100 mg twice daily.  Last CBC was done on February 2023 and showed expected macrocytosis with .  However, white cell count was normal at 5.5 and platelet count was normal.  Last CK on record was in December 2022 and it was low at 22.     In addition to the myopathy, Virginia has a complex history of lumbar spine injury.  She had a previous fusion many years ago for scoliosis, which was complicated by pseudarthrosis and flatback syndrome. There was possibly bone fracture at the site of fusion at L2, which required revision surgery done last year in 5/17/2022. The surgery was an elective  OSTEOTOMY PEDICLE SUBTRACTION L4, INSTRUMENTATION REPLACEMENT T12-S1,PELVIC FIXATION, POSTERIOR SPINE FUSION L3-L5, INSTRUMENTATION AND BONE GRAFT, DURAL REPAIR AND DRAIN PLACEMENT. During the procedure a big dural tear was discovered with exposure of the arachnoid and cauda equina roots. Unfortunately, after the revision she was found with bilateral right more than left leg pain and weakness.  She still has numbness in the entire right leg from the buttock to the thigh and calf, and pain that feels like a toothache.  She is taking gabapentin and Cymbalta for the pain, with incomplete relief.  She recently started a supplement for nerve pain that I was not aware of, which was recommended by her daughter.  I asked the patient to investigate the name of the supplement and let me know.  In February 2023 she had to undergo repeat surgery, for reinstrumentation at right L5-S1.      Compared to the last time I saw her in the EMG lab in December 2022, Virginia does not complain of any proximal arm weakness.  She has no trouble raising the arms overhead.  Her  strength remains normal.  She has no dysphagia dysarthria sialorrhea.  Due to her kyphoscoliosis, she had developed a restrictive respiratory dysfunction many years ago, and she is chronically on oxygen.  Her leg strength has not changed significantly compared to December 2022.     They report of the EMG I did on December 8, 2022 is attached below:    INTERPRETATION: Abnormal study. There is electrodiagnostic evidence of 1) Asymmetric bilateral lumbosacral radiculopathies at L5 and S1. There is active denervation in right L5 and bilateral S1 levels, and the changes overall are more severe on the right (especially S1), in keeping with the clinical findings. There is also evidence of milder chronic bilateral L4 radiculopathies, based on motor unit remodeling seen in bilateral quadriceps muscles. 2) Mild myopathic changes in few proximal muscles, such as the iliopsoas, biceps, and triceps, without irritability. The lack of membrane irritability in those muscles argues against active inflammatory/necrotizing myopathy. It is possible that those changes reflect deconditioning with disuse (type 2) atrophy of muscle. There is no electrodiagnostic evidence of a sensory polyneuropathy from this study. Clinical correlation is recommended.      /81   Pulse 88   Resp 16   SpO2 95%     On exam today, Jenna is in good spirits.  She has no facial weakness or ptosis or ophthalmoparesis.  Neck flexion strength is normal.  Her strength is 5/5 for bilateral deltoids, biceps, wrist extensors, and hand .  Triceps are 5 on the right but 4 - on the left, notably asymmetric.  There is also mild weakness of left wrist flexors at 4/5, whereas they are normal in the right.  FDI and APB strength is 4 bilaterally.  Strength for hip flexion is 3 -  on the right and 4+ on the left.  Knee extension is bilaterally 5.  Knee flexion is right 4 left 5, and foot dorsiflexion is right 4 left 5.  Reflex are absent at the ankles, trace to 1+ at the knees, and brisk 2-3+ and symmetric at the upper extremities.    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  We will recheck CK today as well as CBC, AST, ALT, given chronic azathioprine use.  If CK is normal or low, we will taper azathioprine to 150 mg daily for the next 3 months, then 100 mg daily.  She has been evaluated by dermatology already and she should continue annual screening.    The second problem is severe lumbosacral radiculopathies, right more than left, which is likely due to cauda equina nerve injury that occurred either shortly prior to or during her revision surgery last year.  She has loss of perineal sensation and incontinence, for which she will see urology in March 12.  I told the patient that nerve regrowth may occur after this type of events, but the degree of recovery is unpredictable, and it may take a very long time.  She should continue her current pain management and PT.  I do not have any additional recommendations to provide for that problem.    Thirdly, she has asymmetric triceps and wrist flexor weakness, affecting only the left side.  This is not likely to be myopathy related, but could represent left C7 radiculopathy.  Unfortunately, I did not test her left upper extremity in the last EMG.  This problem does require further investigation and I will order another EMG, and try to do it on April 12 when she will come again to [place], for her urology appointment.    I will see her in follow-up in 6 months or sooner if needed.  Billing MDM level 4 (moderate) due to: #1 problems assessed: 2 or more stable conditions (autoimmune necrotizing myopathy, cauda equina nerve injury, possible left C7 radiculopathy)  and #2 amount/complexity: ordering 3 unique tests today, and #3 risk: Prescription drug management.    Sincerely,    Keron Valencia MD, FAAN

## 2023-03-31 ENCOUNTER — TELEPHONE (OUTPATIENT)
Dept: UROLOGY | Facility: CLINIC | Age: 73
End: 2023-03-31
Payer: COMMERCIAL

## 2023-03-31 DIAGNOSIS — N39.46 MIXED INCONTINENCE: Primary | ICD-10-CM

## 2023-03-31 NOTE — TELEPHONE ENCOUNTER
----- Message from Jessica Cleary CMA sent at 3/31/2023 10:28 AM CDT -----  Regarding: Fax orders  Girish Soares!    I am working from home today.  Patient will need UA/UC orders faxed to [facility] at [phone], attention to Tobias.  Would you be able to fax orders and my note from today?  Thank you very much!!!    [place]

## 2023-03-31 NOTE — TELEPHONE ENCOUNTER
Detailed voicemail left for Tobias at [facility].  Patient is scheduled for Urodynamics testing with Charlotte Chu PA-C on 4/12.  Patient has a hx of UTI's, and currently is bowel and bladder incontinent and is unable to leave a urine sample.  I would like to have her home health nurse collect a catheterized urine specimen from patient next week by Wednesday at the very latest to rule out UTI.  Orders placed and will have clinic staff fax orders to [phone], attention to Tobias.  Asking Tobias to fax results back to us at [phone] when available.  Will watch for results.    Jessica Cleary, CMA

## 2023-04-04 ENCOUNTER — TELEPHONE (OUTPATIENT)
Dept: UROLOGY | Facility: CLINIC | Age: 73
End: 2023-04-04
Payer: COMMERCIAL

## 2023-04-04 NOTE — TELEPHONE ENCOUNTER
Writer called and left VM with info below:    Labs would be best to be done ASAP due to UDS being next week and if positive we need time to treat.

## 2023-04-04 NOTE — TELEPHONE ENCOUNTER
[facility] Call Center    Phone Message    May a detailed message be left on voicemail: yes     Reason for Call: Other: Manjit who is a Home Care Nurse with [facility] calling in regards to patient having UA/UC lab order. States she thought the draw was placed for 04/10 or 04/11 but states patient was thinking it was suppose to be this week and Manjit states that's fine she can do it on 04/06 but wants to know if that's ok with provider. Please contact Manjit at [phone] in regards to this message. She states the order can be left over the phone if she doesn't answer. Thank you     Action Taken: Message routed to:  [facility] Urology    Travel Screening: Not Applicable

## 2023-04-10 ENCOUNTER — TELEPHONE (OUTPATIENT)
Dept: UROLOGY | Facility: CLINIC | Age: 73
End: 2023-04-10
Payer: COMMERCIAL

## 2023-04-10 DIAGNOSIS — N39.0 URINARY TRACT INFECTION: Primary | ICD-10-CM

## 2023-04-10 RX ORDER — DOXYCYCLINE 100 MG/1
100 CAPSULE ORAL 2 TIMES DAILY
Qty: 14 CAPSULE | Refills: 0 | Status: SHIPPED | OUTPATIENT
Start: 2023-04-10 | End: 2023-04-17

## 2023-04-10 NOTE — TELEPHONE ENCOUNTER
Lab records received from [facility]. Faxed to stat scanning and placed in folder.    [facility] Visit Facilitator- Surgical Specialties

## 2023-04-10 NOTE — PROGRESS NOTES
Spoke with patient today.  Patients recent urine culture was positive for UTI.  Per Kendy Camara CNP, OK to Rx Doxycycline 100 mg BID x 7 days.  Orders placed and sent to pharmacy requested.  OK to proceed with Urodynamics testing on 4/12, as long as patient is taking the prescribed antibiotics.    Jessica Cleary, CMA

## 2023-04-12 ENCOUNTER — OFFICE VISIT (OUTPATIENT)
Dept: NEUROLOGY | Facility: CLINIC | Age: 73
End: 2023-04-12
Payer: COMMERCIAL

## 2023-04-12 ENCOUNTER — ALLIED HEALTH/NURSE VISIT (OUTPATIENT)
Dept: UROLOGY | Facility: CLINIC | Age: 73
End: 2023-04-12
Payer: COMMERCIAL

## 2023-04-12 ENCOUNTER — ANCILLARY PROCEDURE (OUTPATIENT)
Dept: RADIOLOGY | Facility: AMBULATORY SURGERY CENTER | Age: 73
End: 2023-04-12
Attending: PHYSICIAN ASSISTANT
Payer: COMMERCIAL

## 2023-04-12 VITALS
WEIGHT: 194 LBS | HEART RATE: 105 BPM | BODY MASS INDEX: 33.12 KG/M2 | SYSTOLIC BLOOD PRESSURE: 133 MMHG | DIASTOLIC BLOOD PRESSURE: 85 MMHG | HEIGHT: 64 IN

## 2023-04-12 DIAGNOSIS — N31.9 NEUROGENIC BLADDER: ICD-10-CM

## 2023-04-12 DIAGNOSIS — R29.898 LEFT ARM WEAKNESS: ICD-10-CM

## 2023-04-12 DIAGNOSIS — R39.89 URINARY PROBLEM: ICD-10-CM

## 2023-04-12 DIAGNOSIS — N39.46 MIXED INCONTINENCE: Primary | ICD-10-CM

## 2023-04-12 DIAGNOSIS — Z98.890 S/P SPINAL SURGERY: ICD-10-CM

## 2023-04-12 PROCEDURE — 51600 INJECTION FOR BLADDER X-RAY: CPT | Performed by: PHYSICIAN ASSISTANT

## 2023-04-12 PROCEDURE — 51728 CYSTOMETROGRAM W/VP: CPT | Performed by: PHYSICIAN ASSISTANT

## 2023-04-12 PROCEDURE — 51784 ANAL/URINARY MUSCLE STUDY: CPT | Performed by: PHYSICIAN ASSISTANT

## 2023-04-12 PROCEDURE — 95886 MUSC TEST DONE W/N TEST COMP: CPT | Performed by: PSYCHIATRY & NEUROLOGY

## 2023-04-12 PROCEDURE — 74455 X-RAY URETHRA/BLADDER: CPT | Performed by: PHYSICIAN ASSISTANT

## 2023-04-12 PROCEDURE — 95909 NRV CNDJ TST 5-6 STUDIES: CPT | Performed by: PSYCHIATRY & NEUROLOGY

## 2023-04-12 PROCEDURE — 51797 INTRAABDOMINAL PRESSURE TEST: CPT | Performed by: PHYSICIAN ASSISTANT

## 2023-04-12 PROCEDURE — 51741 ELECTRO-UROFLOWMETRY FIRST: CPT | Performed by: PHYSICIAN ASSISTANT

## 2023-04-12 RX ORDER — IOPAMIDOL 510 MG/ML
100 INJECTION, SOLUTION INTRAVASCULAR ONCE
Status: COMPLETED | OUTPATIENT
Start: 2023-04-12 | End: 2023-04-12

## 2023-04-12 RX ORDER — ALENDRONATE SODIUM 70 MG/1
70 TABLET ORAL WEEKLY
COMMUNITY
Start: 2023-03-28

## 2023-04-12 RX ADMIN — IOPAMIDOL 100 ML: 510 INJECTION, SOLUTION INTRAVASCULAR at 11:31

## 2023-04-12 ASSESSMENT — PAIN SCALES - GENERAL: PAINLEVEL: NO PAIN (0)

## 2023-04-12 NOTE — PROGRESS NOTES
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 She was recently found to have weakness of wrist flexion and elbow extension, and is referred for further evaluation of these findings.     Techniques:  Motor conduction studies were done with surface recording electrodes. Sensory conduction studies were performed with surface electrodes, unless indicated otherwise by (n), designating the use of subdermal recording electrodes. Temperature was monitored and recorded throughout the study. Upper extremities were maintained at a temperature of 32 degrees Centigrade or higher.  EMG was done with a concentric needle electrode.     Results:  Left median, ulnar, and radial sensory conduction studies were normal. Left median and ulnar motor conduction studies were normal. Electromyography was normal.    Interpretation:  This is a normal study. In particular, there is no electrophysiologic evidence of cervical radiculopathy, polyneuropathy, or currently active myopathy in the left upper limb.      _____________________________  Kyle Syed MD  Board Certified in Clinical Neurophysiology and Neuromuscular Medicine        Nerve Conduction Studies  Motor Sites      Latency Amplitude Neg. Amp Diff Segment Distance Velocity Neg. Dur Neg Area Diff Temperature Comment   Site (ms) Norm (mV) Norm %  cm m/s Norm ms %  C    Left Median (APB) Motor   Wrist 4.1  < 4.4 6.6  > 5.0  Wrist-APB 8   5.2  32.2    Elbow 8.0 - 5.6  > 5.0 -15.2 Elbow-Wrist  21.5 55  > 48 5.3 -14.0 32.2    Left Ulnar (ADM) Motor   Wrist 2.4  < 3.5 7.8  > 5.0  Wrist-ADM 8   5.0  31.9    Bel Elbow 5.5 - 7.0 - -10.3 Bel Elbow-Wrist 18 58  > 48 5.4 -4.7 31.9    Abv Elbow 7.5 - 6.7 - -4.3 Abv Elbow-Bel Elbow 10 50  > 48 5.5 0 31.9      Sensory Sites      Onset Lat Peak Lat Amp (O-P) Amp (P-P) Segment Distance Velocity Temperature Comment   Site ms ms  V Norm  V  cm m/s Norm  C    Left Median Sensory   Wrist-Dig II 2.9 4.0 19  > 10 21 Wrist-Dig II 14 48  > 48 31.9    Left Radial Sensory   Forearm-Wrist 1.53 2.2 17  > 15 29 Forearm-Wrist 10 65 - 32    Left Ulnar Sensory   Wrist-Dig V 2.2 2.8 21  > 8 42 Wrist-Dig V 12.5 57  > 48 32        Electromyography     Side Muscle Ins Act Fibs/PSW Fasc HF Amp Dur Poly Recrt Int Pat   Left FCR Nml None Nml 0 Nml Nml 0 Nml Nml   Left Triceps Nml None Nml 0 Nml Nml 0 Nml Nml   Left Triceps  LatHd Nml None Nml 0 Nml Nml 0 Nml Nml   Left Triceps LongHd Nml None Nml 0 Nml Nml 0 Nml Nml   Left Deltoid Nml None Nml 0 Nml Nml 0 Nml Nml   Left Biceps Nml None Nml 0 Nml Nml 0 Nml Nml   Left EDC Nml None Nml 0 Nml Nml 0 Nml Nml   Left FDI Nml None Nml 0 Nml Nml 0 Nml Nml   Left C7 Parasp Nml None Nml 0              NCS Waveforms:    Motor         Sensory

## 2023-04-12 NOTE — PROGRESS NOTES
PREPROCEDURE DIAGNOSES:    1. Mixed urinary incontinence  2. History of spinal cord injury and cauda equina compression  3. Bowel incontinence    POSTPROCEDURE DIAGNOSES:  -Maximum cystometric capacity ~350 mL with absent filling sensations.  -Good bladder compliance without detrusor overactivity. There is a very minimal and gradual increase in detrusor pressure at volumes >200 mL. She leaks when PDet reaches ~6 cm H2O around 350 mL with near continuous urinary incontinence after this point.  -Multiple stress leaks at Pabd pressures ranging from 51-71 cm H2O starting at bladder volumes ~200 mL. Low ALPP suggestive for ISD.  -No appreciable detrusor contraction during voiding. She voids primarily by Valsalva effort. Voids 79 mL with slow (Qmax 8.4 ml/s), interrupted flow, some increased EMG activity, and incomplete bladder emptying ( mL).  -Difficult to evaluate for bladder outlet obstruction given no detrusor contraction during voiding, but would seem most likely that incomplete emptying is secondary to acontractile detrusor.  -Fluoroscopy reveals a mild/moderately trabeculated bladder wall without diverticuli or VUR. The bladder neck is open during filling and voiding.     PROCEDURE:    1. Sterile urethral catheterization for measurement of residual urine volume.  2. Complex filling cystometrogram with measurement of bladder and rectal pressures.  3. Complex voiding cystometrogram with measurement of bladder and rectal pressures.  4. Electromyography of the pelvic floor during urodynamics.  5. Fluoroscopic imaging of the bladder during urodynamics, at least 3 views.    6. Interpretation of urodynamics and flouroscopic imaging.      INDICATIONS FOR PROCEDURE:  Ms. Jenna Jahveri is a pleasant 73 year old female with a history of spinal cord injury and cauda equina compression complicated by neurogenic bladder and bowel with fecal and urinary incontinence. Baseline video urodynamic assessment is requested  today by Dr. Shaikh to better characterize Ms. Jenna Jhaveri's voiding dysfunction.      VOIDING DIARY:  Did not complete.    DESCRIPTION OF PROCEDURE:  Risks, benefits, and alternatives to urodynamics were discussed with the patient and she wished to proceed.  Urodynamics are planned to better assess the primary etiology for Ms. Jhaveri's urologic dysfunction.  The patient does not currently take anticholinergic or beta 3 agonist medications for her bladder.  After informed consent was obtained, the patient was taken to the procedure room where uroflowmetry was performed. Findings below.     PRE-STUDY UROFLOWMETRY:  Not performed as patient had no urge to void.  Postvoid residual by catheter: 20 mL.  Pretest urine dipstick was not performed per clinician discretion. Patient is currently taking appropriate antibiotics for a recent positive urine culture and denies UTI symptoms today.    Next a 7F double-lumen urodynamics catheter was inserted into the bladder under sterile technique via urethra.  A 7F abdominal manometry catheter was placed in the vagina.  EMG pads were placed on both sides of the anal verge.  The bladder was filled with 100 mL of Isovue-250 at 50 mL/minute and serial pressures were recorded.  With coughing there was an appropriate rise in vesical and abdominal pressures with no change in detrusor pressure, confirming good study catheter placement.    DURING THE FILLING PHASE:  First sensation: 79 mL.  First Desire: not reported  Strong Desire: not reported  Maximum Capacity: ~350 mL.    Uninhibited detrusor contractions: no obvious DO.  Compliance: good. There is a very minimal and gradual increase in detrusor pressure at volumes >200 mL. She leaks when PDet reaches ~6 cm H2O around 350 mL with near continuous urinary incontinence after this point.  Continence: multiple stress leaks at Pabd pressures ranging from 51-71 cm H2O starting at bladder volumes ~200 mL. Low ALPP suggestive for ISD.  EMG:  mostly concordant during filling.    DURING THE VOIDING PHASE:  Maximum detrusor contraction with void: no appreciable detrusor contraction. She primarily Valsalva voids.  Voided volume: 79 mL.  Maximum flow rate: 8.4 mL/sec.  Average flow rate: 2.1 mL/sec.  Postvoid Residual: 275 mL.  EMG activity: mildly increased.  Character of voiding curve: intermittent.  BOOI: -14.6 (suggesting no obstruction - see key below)  [obstructed (MASSEY index [BOOI] ? 40); equivocal (no definite   obstruction; BOOI 20-40); and no obstruction (BOOI ? 20)]    FLUOROSCOPIC IMAGING OF THE BLADDER DURING URODYNAMICS:  Please note, image numbers on UDS tracings correlate with iSite series numbers on PACS images. Fluoroscopy during today's procedure demonstrated a mild/moderately trabeculated bladder wall without diverticulae or cellules.  No vesicoureteral reflux was observed.  The bladder neck was open during filling. At the conclusion of today's study, all catheters were removed, and the patient was straight catheterized for residual volume.     ASSESSMENT/PLAN:  Ms. Jenna Jhaveri is a pleasant 73 year old female with neurogenic bladder who demonstrated the following findings today on urodynamic evaluation:    -Maximum cystometric capacity ~350 mL with absent filling sensations.  -Good bladder compliance without detrusor overactivity. There is a very minimal and gradual increase in detrusor pressure at volumes >200 mL. She leaks when PDet reaches ~6 cm H2O around 350 mL with near continuous urinary incontinence after this point.  -Multiple stress leaks at Pabd pressures ranging from 51-71 cm H2O starting at bladder volumes ~200 mL. Low ALPP suggestive for ISD.  -No appreciable detrusor contraction during voiding. She voids primarily by Valsalva effort. Voids 79 mL with slow (Qmax 8.4 ml/s), interrupted flow, some increased EMG activity, and incomplete bladder emptying ( mL).  -Difficult to evaluate for bladder outlet obstruction  given no detrusor contraction during voiding, but would seem most likely that incomplete emptying is secondary to acontractile detrusor.  -Fluoroscopy reveals a mild/moderately trabeculated bladder wall without diverticuli or VUR. The bladder neck is open during filling and voiding.     The patient will follow up as scheduled with Dr. Shaikh to further discuss today's study results and make plans for how best to proceed.      - The patient is currently taking Doxycycline for a recent positive urine culture. Therefore, additional antibiotic prophylaxis was not administered today. The risk of UTI with VUDS is low at ~2.5-3%.      Thank you for allowing me to participate in the care of Ms. Jenna Jhaveri and please don't hesitate to contact me with any questions or concerns.      Charlotte Chu PA-C  Urology Physician Assistant

## 2023-04-12 NOTE — NURSING NOTE
"  Chief Complaint   Patient presents with     Urodynamics Study     Urinary incontinence       Blood pressure 133/85, pulse 105, height 1.613 m (5' 3.5\"), weight 88 kg (194 lb). Body mass index is 33.83 kg/m .    Patient Active Problem List   Diagnosis     Polymyositis (H)       Allergies   Allergen Reactions     Potassium Shortness Of Breath and Palpitations     IV Potassium     Statins [Hmg-Coa-R Inhibitors] Other (See Comments)     Statin induced myopathy     Penicillin G Swelling     Latex Hives     NO REACTION BUT SCREENING SCORE OF 6     Other Drug Allergy (See Comments) Other (See Comments)     Statin induced myopathy       Current Outpatient Medications   Medication Sig Dispense Refill     acetaminophen (TYLENOL) 500 MG tablet Take 500 mg by mouth every 6 hours       albuterol (PROAIR HFA/PROVENTIL HFA/VENTOLIN HFA) 108 (90 Base) MCG/ACT inhaler Inhale 2 puffs into the lungs every 6 hours as needed for shortness of breath / dyspnea or wheezing       albuterol (PROVENTIL) (2.5 MG/3ML) 0.083% neb solution USE 1 VIAL IN NEBULIZER EVERY 4 HOURS AS NEEDED FOR SHORTNESS OF BREATH COUGH OR WITH EXERCISE       alendronate (FOSAMAX) 35 MG tablet Take 1 tablet (35 mg) by mouth every 7 days 12 tablet 1     alendronate (FOSAMAX) 70 MG tablet Take 70 mg by mouth once a week       amLODIPine (NORVASC) 10 MG tablet Take 10 mg by mouth       aspirin (ASA) 81 MG chewable tablet Take 81 mg by mouth       azaTHIOprine (IMURAN) 50 MG tablet TAKE 2 TABLETS BY MOUTH IN THE MORNING AND IN THE EVENING 120 tablet 0     azaTHIOprine (IMURAN) 50 MG tablet Take 50 mg by mouth 2 times daily       bisacodyl (DULCOLAX) 10 MG suppository Place 10 mg rectally daily as needed for constipation       Calcium-Vitamin D-Vitamin K 500-200-40 MG-UNT-MCG CHEW 650 Ca - 500 D - 40 K       cholecalciferol 50 MCG (2000 UT) tablet Take 2,000 Units by mouth        doxycycline hyclate (VIBRAMYCIN) 100 MG capsule Take 1 capsule (100 mg) by mouth 2 times " daily for 7 days 14 capsule 0     DULoxetine (CYMBALTA) 20 MG capsule        DULoxetine HCl 40 MG CPEP        fluocinonide (LIDEX) 0.05 % external solution        fluticasone (FLONASE) 50 MCG/ACT nasal spray Spray 1 spray in nostril       fluticasone-salmeterol (ADVAIR) 250-50 MCG/DOSE inhaler        gabapentin (NEURONTIN) 300 MG capsule Take 300 mg by mouth 3 times daily       ipratropium (ATROVENT) 0.06 % nasal spray Spray 2 sprays into both nostrils 4 times daily       ipratropium - albuterol 0.5 mg/2.5 mg/3 mL (DUONEB) 0.5-2.5 (3) MG/3ML neb solution Inhale 3 mLs into the lungs        ketoconazole (NIZORAL) 2 % external shampoo        loratadine (CLARITIN) 10 MG tablet Take 10 mg by mouth       melatonin 3 MG CAPS        methocarbamol (ROBAXIN) 500 MG tablet Take 500 mg by mouth       Omega-3 Fatty Acids (FISH OIL OMEGA-3 PO)        omeprazole (PRILOSEC) 20 MG DR capsule Take 1 capsule by mouth once daily 90 capsule 0     omeprazole (PRILOSEC) 40 MG DR capsule Take 40 mg by mouth       Probiotic Product (PROBIOTIC-10 PO) Take by mouth daily probio - 5       zinc sulfate (ZINCATE) 220 (50 Zn) MG capsule Take 220 mg by mouth daily       atenolol (TENORMIN) 50 MG tablet Take 50 mg by mouth daily (Patient not taking: Reported on 1/26/2022)       atorvastatin (LIPITOR) 20 MG tablet Take 20 mg by mouth daily (Patient not taking: Reported on 1/26/2022)       baclofen (LIORESAL) 10 MG tablet Take 10 mg by mouth 2 times daily around 3 pm and at bedtime. (Patient not taking: Reported on 3/20/2023)       baclofen (LIORESAL) 10 MG tablet 1/2 TAB (5MG) AT 6PM (Patient not taking: Reported on 3/20/2023)       cyclobenzaprine (FLEXERIL) 10 MG tablet Take 5 mg by mouth (Patient not taking: Reported on 8/26/2021)       furosemide (LASIX) 20 MG tablet Take 20 mg by mouth daily (Patient not taking: Reported on 3/20/2023)       HYDROcodone-acetaminophen (NORCO)  MG per tablet 3 X DAILY (Patient not taking: Reported on  2022)       HYDROcodone-acetaminophen (NORCO) 5-325 MG tablet  (Patient not taking: Reported on 3/20/2023)       ibuprofen (ADVIL/MOTRIN) 600 MG tablet limit use to 2 x per week (Patient not taking: Reported on 3/20/2023)       Immune Globulin, Human, 30 GM/300ML SOLN every 28 days (Patient not taking: Reported on 3/20/2023)       oxyCODONE (ROXICODONE) 5 MG tablet Take 5-10 mg by mouth (Patient not taking: Reported on 3/20/2023)       predniSONE (DELTASONE) 10 MG tablet Take 1 10 mg tablet daily for two weeks. Then take 1 10 mg tablet every other day for two weeks. Then stop. (Patient not taking: Reported on 2022) 30 tablet 1     predniSONE (DELTASONE) 20 MG tablet TAKE 1 TABLET BY MOUTH TWICE DAILY WITH MEALS FOR 5 DAYS (Patient not taking: No sig reported)       primidone (MYSOLINE) 50 MG tablet Take 50 mg by mouth       propranolol (INDERAL) 10 MG tablet Take 10 mg by mouth three times daily as needed (tremor).       propranolol ER (INDERAL LA) 60 MG 24 hr capsule Take 60 mg by mouth        sulfamethoxazole-trimethoprim (BACTRIM DS) 800-160 MG tablet Take 1 tablet by mouth three times a week (Patient not taking: Reported on 3/20/2023) 36 tablet 0     XARELTO ANTICOAGULANT 20 MG TABS tablet  (Patient not taking: Reported on 2021)         Social History     Tobacco Use     Smoking status: Former     Types: Cigarettes     Quit date:      Years since quittin.2     Smokeless tobacco: Never       Invasive Procedure Safety Checklist:    Procedure: Urodynamics    Action: Complete sections and checkboxes as appropriate.    Pre-procedure:    1. Patient ID Verified with 2 identifiers (Hiwot and  or MRN) : YES    2. Procedure and site verified with patient/designee (when able) : YES    3. Accurate consent documentation in medical record : YES    4. H&P (or appropriate assessment) documented in medical record : N/A    H&P must be up to 30 days prior to procedure an updated within 24 hours of  Procedure as applicable.     5. Relevant diagnostic and radiology test results appropriately labeled and displayed as applicable : YES    6. Blood products, implants, devices, and/or special equipment available for the procedure as applicable : YES    7. Procedure site(s) marked with provider initials [Exclusions: none] : NO    8. Marking not required. Reason : Yes  Procedure does not require site marking    Time Out:     Time-Out performed immediately prior to starting procedure, including verbal and active participation of all team members addressing: YES    1. Correct patient identity.  2. Confirmed that the correct side and site are marked.  3. An accurate procedure to be done.  4. Agreement on the procedure to be done.  5. Correct patient position.  6. Relevant images and results are properly labeled and appropriately displayed.  7. The need to administer antibiotics or fluids for irrigation purposes during the procedure as applicable.  8. Safety precautions based on patient history or medication use.    During Procedure: Verification of correct person, site, and procedure occurs any time the responsibility for care of the patient is transferred to another member of the care team.              The following medication was given:     MEDICATION:  Isovue-250  ROUTE:  Provider Administered  SITE: Provider Administered  DOSE: 100 mL  LOT #: [id number]  : [place]  EXPIRATION DATE: 9/30/2025  NDC#: 3186-2823-31  Was there drug waste? No            Jessica Cleary CMA  4/12/2023  9:18 AM

## 2023-04-13 DIAGNOSIS — G72.49 AUTOIMMUNE NECROTIZING MYOPATHY: ICD-10-CM

## 2023-04-14 RX ORDER — AZATHIOPRINE 50 MG/1
TABLET ORAL
Qty: 90 TABLET | Refills: 2 | Status: SHIPPED | OUTPATIENT
Start: 2023-04-14 | End: 2023-07-17

## 2023-04-25 ENCOUNTER — VIRTUAL VISIT (OUTPATIENT)
Dept: UROLOGY | Facility: CLINIC | Age: 73
End: 2023-04-25
Payer: COMMERCIAL

## 2023-04-25 VITALS — WEIGHT: 194 LBS | HEIGHT: 64 IN | BODY MASS INDEX: 33.12 KG/M2

## 2023-04-25 DIAGNOSIS — R15.9 INCONTINENCE OF FECES, UNSPECIFIED FECAL INCONTINENCE TYPE: ICD-10-CM

## 2023-04-25 DIAGNOSIS — Z98.890 S/P SPINAL SURGERY: ICD-10-CM

## 2023-04-25 DIAGNOSIS — N36.42 INTRINSIC SPHINCTER DEFICIENCY (ISD): ICD-10-CM

## 2023-04-25 DIAGNOSIS — N31.9 NEUROGENIC BLADDER: Primary | ICD-10-CM

## 2023-04-25 DIAGNOSIS — Z87.828 HISTORY OF SPINAL CORD INJURY: ICD-10-CM

## 2023-04-25 PROCEDURE — 99214 OFFICE O/P EST MOD 30 MIN: CPT | Mod: VID | Performed by: UROLOGY

## 2023-04-25 ASSESSMENT — PAIN SCALES - GENERAL: PAINLEVEL: MILD PAIN (2)

## 2023-04-25 NOTE — PATIENT INSTRUCTIONS
Websites with free information:    [employer] patient website: www.voicesforpfd.org    [employer] Program: www.totalcontrolprogram.com    [employer] is a cash pay only item that you can order from the [employer] directly.    Please go to [email or web address] and fill out an inquiry.    Jackie phone number: [phone]      1) Renate in [place] for pessary fitting  2) Pelvic floor therapy  3) Surgery if the above 2 dont work    Please see one of the dedicated pelvic floor physical therapist. If you have not heard from the scheduling office within 2 business days, please call [phone] for [employer]    It was a pleasure meeting with you today.  Thank you for allowing me and my team the privilege of caring for you today.  YOU are the reason we are here, and I truly hope we provided you with the excellent service you deserve.  Please let us know if there is anything else we can do for you so that we can be sure you are leaving completely satisfied with your care experience..

## 2023-04-25 NOTE — PROGRESS NOTES
"Pt will meet you in [place]    Pt has had rectum pain that wakes her up at nt.    Virginia is a 73 year old who is being evaluated via a billable video visit.      How would you like to obtain your AVS? [facility] [employer]  If the video visit is dropped, the invitation should be resent by: Text to cell phone: [phone]  Will anyone else be joining your video visit? No      Video-Visit Details    Type of service:  Video Visit   Video Start Time: 1:20 PM  Video End Time:1:39 PM    Originating Location (pt. Location): Home    Distant Location (provider location):  On-site  Platform used for Video Visit: [facility]    April 25, 2023    Jenna was seen today for other.    Diagnoses and all orders for this visit:    Neurogenic bladder  -     Physical Therapy Referral; Future    S/P spinal surgery  -     Physical Therapy Referral; Future    History of spinal cord injury  -     Physical Therapy Referral; Future    Incontinence of feces, unspecified fecal incontinence type  -     Physical Therapy Referral; Future       We discussed some different options today including pelvic floor physical therapy, pessary, or considering surgery for urethral bulking.  She wishes to try non operative options first.     She was also given the information about purewick to consider for night time    RTC to see Renate for pessary fitting    25 minutes were spent today on the day of the encounter in reviewing the EMR including interpretation of the VUDS, direct patient care.  coordination of care and documentation    Ivonne Shaikh MD MPH  (she/her/hers)   of Urology  [facility]      Subjective    Rectal pain and continues to have issues with bowel movements.    She denies any changes in health since last visit    Ht 1.613 m (5' 3.5\")   Wt 88 kg (194 lb)   BMI 33.83 kg/m    GENERAL: healthy, alert and no distress  EYES: Eyes grossly normal to inspection, conjunctivae and sclerae normal  HENT: normal cephalic/atraumatic.  " External ears, nose and mouth without ulcers or lesions.  RESP: no audible wheeze, cough, or visible cyanosis.  No visible retractions or increased work of breathing.  Able to speak fully in complete sentences.  NEURO: Cranial nerves grossly intact, mentation intact and speech normal  PSYCH: mentation appears normal, affect normal/bright, judgement and insight intact, normal speech and appearance well-groomed    VUDS reviewed.  On my interpretation her capacity is 350mL with good compliance and no detrusor overactivity.  She has minimal detrusor function and has USI starting at low bladder volumes.  On voiding her PVR is 275 mL today but I suspect it is because her bladder usually isn't that full (her PVR was 20mL pre procedure)       CC  Patient Care Team:  Arsalan Blas MD as PCP - General  Keron Valencia MD as MD (Neurology)  Keron Valencia MD as Assigned Neuroscience Provider  Charlotte Chu PA-C as Physician Assistant (Urology)  Ivonne Shaikh MD as Assigned Surgical Provider  Arlin Gutiérrez MD as MD (Physical Medicine and Rehabilitation)

## 2023-04-25 NOTE — LETTER
4/25/2023       RE: Jenna Jhaveri  [address] [place] [id number]     Dear Colleague,    Thank you for referring your patient, Jenna Jhaveri, to the [facility] at [facility]. Please see a copy of my visit note below.    Pt will meet you in [place]    Pt has had rectum pain that wakes her up at nt.    Virginia is a 73 year old who is being evaluated via a billable video visit.      How would you like to obtain your AVS? MyChart  If the video visit is dropped, the invitation should be resent by: Text to cell phone: [phone]  Will anyone else be joining your video visit? No      Video-Visit Details    Type of service:  Video Visit   Video Start Time: 1:20 PM  Video End Time:1:39 PM    Originating Location (pt. Location): Home    Distant Location (provider location):  On-site  Platform used for Video Visit: [facility]    April 25, 2023    Jenna was seen today for other.    Diagnoses and all orders for this visit:    Neurogenic bladder  -     Physical Therapy Referral; Future    S/P spinal surgery  -     Physical Therapy Referral; Future    History of spinal cord injury  -     Physical Therapy Referral; Future    Incontinence of feces, unspecified fecal incontinence type  -     Physical Therapy Referral; Future       We discussed some different options today including pelvic floor physical therapy, pessary, or considering surgery for urethral bulking.  She wishes to try non operative options first.     She was also given the information about purewick to consider for night time    RTC to see Renate for pessary fitting    25 minutes were spent today on the day of the encounter in reviewing the EMR including interpretation of the VUDS, direct patient care.  coordination of care and documentation    Ivonne Shaikh MD MPH  (she/her/hers)   of Urology  [facility]      Subjective    Rectal pain and continues to  "have issues with bowel movements.    She denies any changes in health since last visit    Ht 1.613 m (5' 3.5\")   Wt 88 kg (194 lb)   BMI 33.83 kg/m    GENERAL: healthy, alert and no distress  EYES: Eyes grossly normal to inspection, conjunctivae and sclerae normal  HENT: normal cephalic/atraumatic.  External ears, nose and mouth without ulcers or lesions.  RESP: no audible wheeze, cough, or visible cyanosis.  No visible retractions or increased work of breathing.  Able to speak fully in complete sentences.  NEURO: Cranial nerves grossly intact, mentation intact and speech normal  PSYCH: mentation appears normal, affect normal/bright, judgement and insight intact, normal speech and appearance well-groomed    VUDS reviewed.  On my interpretation her capacity is 350mL with good compliance and no detrusor overactivity.  She has minimal detrusor function and has USI starting at low bladder volumes.  On voiding her PVR is 275 mL today but I suspect it is because her bladder usually isn't that full (her PVR was 20mL pre procedure)       CC  Patient Care Team:  Araslan Blas MD as PCP - General  Keron Valencia MD as MD (Neurology)  Keron Valencia MD as Assigned Neuroscience Provider  Charlotte Chu PA-C as Physician Assistant (Urology)  Ivonne Shaikh MD as Assigned Surgical Provider  Arlin Gutiérrez MD as MD (Physical Medicine and Rehabilitation)      "

## 2023-04-28 ENCOUNTER — TELEPHONE (OUTPATIENT)
Dept: UROLOGY | Facility: CLINIC | Age: 73
End: 2023-04-28
Payer: COMMERCIAL

## 2023-04-28 NOTE — TELEPHONE ENCOUNTER
4/28 Called patient and left voicemail. Provided patient with [phone] to schedule with Massiel Berrios PA-C on 5/15 at 9:30 AM.     Krystal farfan Procedure   Dermatology, Surgery, Urology  [facility] [place]    ----- Message from Flor Ge RN sent at 4/28/2023  3:45 PM CDT -----  Regarding: FW: 2 week follow up  Hello,    Are you able to contact patient to schedule an in person visit with Massiel Berrios PA-C on 5/15/23 at 9:30am?    Thank you,    Flor Ge RN, BSN      ----- Message -----  From: Flor Ge RN  Sent: 4/28/2023   3:14 PM CDT  To: Massiel Berrios PA-C; #  Subject: FW: 2 week follow up                             Karen Dewitt,    You are booked up the next couple of weeks. Should we just have patient see you next available?    Thank you,    Flor Ge RN, BSN      ----- Message -----  From: Madalyn Chester  Sent: 4/26/2023   2:47 PM CDT  To: [facility] [place]  Subject: FW: 2 week follow up                             Dr. Shaikh has referred to Renate at the [facility] location, can you please schedule? Thanks!  ----- Message -----  From: Mackenzie Barth  Sent: 4/26/2023   8:18 AM CDT  To: [facility] Physicians - Scheduling Pool  Subject: 2 week follow up                                 Renate [facility] in the next couple weeks for a pessary    [facility]  4/25/23

## 2023-05-04 NOTE — TELEPHONE ENCOUNTER
Cathed UA/UC is fine    Massiel Berrios PA-C    Received message from Massiel Berrios PA-C will have clinic staff complete cathed UA/UC. Updated appointment desk notes.     Bailey Stuart LPN on 5/4/2023 at 3:26 PM

## 2023-05-04 NOTE — TELEPHONE ENCOUNTER
Called patient to confirm if she was having a UTI. Patient states that she never truly knows when she is having one but every time she is tested for a UTI it comes back positive. Staff offered for patient to come in for a nurse visit sooner than her 5/15/23 appointment. Patient stated they lived in [facility] and that is [place] far drive for them. Told patient that staff would talk to Massiel Berrios PA-C about adding a Cathed UA/UC on for that day.       Bailey Stuart LPN on 5/4/2023 at 2:36 PM

## 2023-05-10 PROBLEM — Z98.890 S/P SPINAL SURGERY: Status: ACTIVE | Noted: 2023-05-10

## 2023-05-10 PROBLEM — Z87.828 HISTORY OF SPINAL CORD INJURY: Status: ACTIVE | Noted: 2023-05-10

## 2023-05-10 PROBLEM — N31.9 NEUROGENIC BLADDER: Status: ACTIVE | Noted: 2023-05-10

## 2023-05-10 PROBLEM — R15.9 INCONTINENCE OF FECES, UNSPECIFIED FECAL INCONTINENCE TYPE: Status: ACTIVE | Noted: 2023-05-10

## 2023-05-10 PROBLEM — N36.42 INTRINSIC SPHINCTER DEFICIENCY (ISD): Status: ACTIVE | Noted: 2023-05-10

## 2023-05-15 ENCOUNTER — OFFICE VISIT (OUTPATIENT)
Dept: UROLOGY | Facility: CLINIC | Age: 73
End: 2023-05-15
Payer: COMMERCIAL

## 2023-05-15 DIAGNOSIS — N31.9 NEUROGENIC BLADDER: ICD-10-CM

## 2023-05-15 DIAGNOSIS — N39.46 MIXED INCONTINENCE: ICD-10-CM

## 2023-05-15 DIAGNOSIS — R39.89 URINARY PROBLEM: Primary | ICD-10-CM

## 2023-05-15 LAB
ALBUMIN UR-MCNC: 30 MG/DL
APPEARANCE UR: ABNORMAL
BACTERIA #/AREA URNS HPF: ABNORMAL /HPF
BILIRUB UR QL STRIP: NEGATIVE
COLOR UR AUTO: YELLOW
GLUCOSE UR STRIP-MCNC: NEGATIVE MG/DL
HGB UR QL STRIP: ABNORMAL
KETONES UR STRIP-MCNC: NEGATIVE MG/DL
LEUKOCYTE ESTERASE UR QL STRIP: ABNORMAL
NITRATE UR QL: POSITIVE
PH UR STRIP: 6.5 [PH] (ref 5–7)
RBC #/AREA URNS AUTO: ABNORMAL /HPF
SKIP: ABNORMAL
SP GR UR STRIP: 1.02 (ref 1–1.03)
SQUAMOUS #/AREA URNS AUTO: ABNORMAL /LPF
UROBILINOGEN UR STRIP-MCNC: NORMAL MG/DL
WBC #/AREA URNS AUTO: >100 /HPF

## 2023-05-15 PROCEDURE — A4562 PESSARY, NON RUBBER,ANY TYPE: HCPCS | Performed by: PHYSICIAN ASSISTANT

## 2023-05-15 PROCEDURE — 87088 URINE BACTERIA CULTURE: CPT | Performed by: PHYSICIAN ASSISTANT

## 2023-05-15 PROCEDURE — 99214 OFFICE O/P EST MOD 30 MIN: CPT | Mod: 25 | Performed by: PHYSICIAN ASSISTANT

## 2023-05-15 PROCEDURE — 87086 URINE CULTURE/COLONY COUNT: CPT | Performed by: PHYSICIAN ASSISTANT

## 2023-05-15 PROCEDURE — 57160 INSERT PESSARY/OTHER DEVICE: CPT | Performed by: PHYSICIAN ASSISTANT

## 2023-05-15 PROCEDURE — 87186 SC STD MICRODIL/AGAR DIL: CPT | Performed by: PHYSICIAN ASSISTANT

## 2023-05-15 PROCEDURE — 81001 URINALYSIS AUTO W/SCOPE: CPT | Performed by: PHYSICIAN ASSISTANT

## 2023-05-15 RX ORDER — CEPHALEXIN 500 MG/1
500 CAPSULE ORAL 2 TIMES DAILY
Qty: 14 CAPSULE | Refills: 0 | Status: SHIPPED | OUTPATIENT
Start: 2023-05-15 | End: 2023-09-22

## 2023-05-15 NOTE — PROGRESS NOTES
Urology Clinic      Name: Jenna Jhaveri    MRN: [id number]   YOB: 1950  Accompanied at today's visit by:self                 Chief Complaint:   Neurogenic bladder          History of Present Illness:   May 15, 2023    HISTORY:   We have been following 73 year old Jenna Jhaveri for neurogenic bladder with hx of spinal cord injury and spinal surgery, ISD and FI. VUDS showed capacity of 350mL, good compliance and no detrusor overactivity. She had minimal detrusor function and has USI starting at low bladder volumes. Last seen by Dr. Shaikh on 4/25/23 and was referred to PFPT and discussed pessary, PFPT, as well as consideration for urethral bulking. She wished to persue non-operative treatments first. Here today for pessary fitting. Denies any s/s of UTI today or having recent UTIs. Hx of benign essential tremor, VICKI, restrictive lung disease complicated by chronic hypoxemic respiratory failure on home O2, and psoriasis. Patient voices no other concerns at this time.            Allergies:     Allergies   Allergen Reactions     Potassium Shortness Of Breath and Palpitations     IV Potassium     Statins [Statins] Other (See Comments)     Statin induced myopathy     Penicillin G Swelling     Latex Hives     NO REACTION BUT SCREENING SCORE OF 6     Other Drug Allergy (See Comments) Other (See Comments)     Statin induced myopathy            Medications:     Current Outpatient Medications   Medication Sig     acetaminophen (TYLENOL) 500 MG tablet Take 500 mg by mouth every 6 hours     albuterol (PROAIR HFA/PROVENTIL HFA/VENTOLIN HFA) 108 (90 Base) MCG/ACT inhaler Inhale 2 puffs into the lungs every 6 hours as needed for shortness of breath / dyspnea or wheezing     albuterol (PROVENTIL) (2.5 MG/3ML) 0.083% neb solution USE 1 VIAL IN NEBULIZER EVERY 4 HOURS AS NEEDED FOR SHORTNESS OF BREATH COUGH OR WITH EXERCISE     alendronate (FOSAMAX) 35 MG tablet Take 1 tablet (35 mg) by mouth every 7 days      alendronate (FOSAMAX) 70 MG tablet Take 70 mg by mouth once a week     amLODIPine (NORVASC) 10 MG tablet Take 10 mg by mouth     aspirin (ASA) 81 MG chewable tablet Take 81 mg by mouth     atenolol (TENORMIN) 50 MG tablet Take 50 mg by mouth daily (Patient not taking: Reported on 1/26/2022)     atorvastatin (LIPITOR) 20 MG tablet Take 20 mg by mouth daily (Patient not taking: Reported on 1/26/2022)     azaTHIOprine (IMURAN) 50 MG tablet TAKE 2 TABLETS BY MOUTH IN THE MORNING AND 1 TABLET IN THE EVENING     azaTHIOprine (IMURAN) 50 MG tablet Take 50 mg by mouth 2 times daily     baclofen (LIORESAL) 10 MG tablet Take 10 mg by mouth 2 times daily around 3 pm and at bedtime. (Patient not taking: Reported on 3/20/2023)     baclofen (LIORESAL) 10 MG tablet 1/2 TAB (5MG) AT 6PM (Patient not taking: Reported on 3/20/2023)     bisacodyl (DULCOLAX) 10 MG suppository Place 10 mg rectally daily as needed for constipation     Calcium-Vitamin D-Vitamin K 500-200-40 MG-UNT-MCG CHEW 650 Ca - 500 D - 40 K     cholecalciferol 50 MCG (2000 UT) tablet Take 2,000 Units by mouth      cyclobenzaprine (FLEXERIL) 10 MG tablet Take 5 mg by mouth (Patient not taking: Reported on 8/26/2021)     DULoxetine (CYMBALTA) 20 MG capsule      DULoxetine HCl 40 MG CPEP      fluocinonide (LIDEX) 0.05 % external solution      fluticasone (FLONASE) 50 MCG/ACT nasal spray Spray 1 spray in nostril     fluticasone-salmeterol (ADVAIR) 250-50 MCG/DOSE inhaler      furosemide (LASIX) 20 MG tablet Take 20 mg by mouth daily (Patient not taking: Reported on 3/20/2023)     gabapentin (NEURONTIN) 300 MG capsule Take 300 mg by mouth 3 times daily     HYDROcodone-acetaminophen (NORCO)  MG per tablet 3 X DAILY (Patient not taking: Reported on 1/26/2022)     HYDROcodone-acetaminophen (NORCO) 5-325 MG tablet  (Patient not taking: Reported on 3/20/2023)     ibuprofen (ADVIL/MOTRIN) 600 MG tablet limit use to 2 x per week (Patient not taking: Reported on 3/20/2023)      Immune Globulin, Human, 30 GM/300ML SOLN every 28 days (Patient not taking: Reported on 3/20/2023)     ipratropium (ATROVENT) 0.06 % nasal spray Spray 2 sprays into both nostrils 4 times daily     ipratropium - albuterol 0.5 mg/2.5 mg/3 mL (DUONEB) 0.5-2.5 (3) MG/3ML neb solution Inhale 3 mLs into the lungs      ketoconazole (NIZORAL) 2 % external shampoo      loratadine (CLARITIN) 10 MG tablet Take 10 mg by mouth     melatonin 3 MG CAPS      methocarbamol (ROBAXIN) 500 MG tablet Take 500 mg by mouth     Omega-3 Fatty Acids (FISH OIL OMEGA-3 PO)      omeprazole (PRILOSEC) 20 MG DR capsule Take 1 capsule by mouth once daily     omeprazole (PRILOSEC) 40 MG DR capsule Take 40 mg by mouth     oxyCODONE (ROXICODONE) 5 MG tablet Take 5-10 mg by mouth (Patient not taking: Reported on 3/20/2023)     predniSONE (DELTASONE) 10 MG tablet Take 1 10 mg tablet daily for two weeks. Then take 1 10 mg tablet every other day for two weeks. Then stop. (Patient not taking: Reported on 1/26/2022)     predniSONE (DELTASONE) 20 MG tablet TAKE 1 TABLET BY MOUTH TWICE DAILY WITH MEALS FOR 5 DAYS (Patient not taking: No sig reported)     primidone (MYSOLINE) 50 MG tablet Take 50 mg by mouth     Probiotic Product (PROBIOTIC-10 PO) Take by mouth daily probio - 5     propranolol (INDERAL) 10 MG tablet Take 10 mg by mouth three times daily as needed (tremor).     propranolol ER (INDERAL LA) 60 MG 24 hr capsule Take 60 mg by mouth      sulfamethoxazole-trimethoprim (BACTRIM DS) 800-160 MG tablet Take 1 tablet by mouth three times a week (Patient not taking: Reported on 3/20/2023)     XARELTO ANTICOAGULANT 20 MG TABS tablet  (Patient not taking: Reported on 8/26/2021)     zinc sulfate (ZINCATE) 220 (50 Zn) MG capsule Take 220 mg by mouth daily     No current facility-administered medications for this visit.               Past  Surgical History:   No past surgical history on file.          Physical Exam:   There were no vitals filed for this  visit.  PSYCH: NAD  EYES: EOMI  NEURO: AAO x3  : Vulva is normal in appearance. Urethra normal. Vaginal mucosa atrophic. No pain to palpation on internal/external exam. No obvious masses, lesions, ulcers, bleeding noted on internal or external exam. Pessary ring #2 placed without issues. Appears to fit appropriately. Question if pessary ring #1 would be better fit however do not have this size as trial or for pessary to go home with.     UA RESULTS:  Recent Labs   Lab Test 05/15/23  0925   COLOR Yellow   APPEARANCE Cloudy*   URINEGLC Negative   URINEBILI Negative   URINEKETONE Negative   SG 1.018   UBLD Small*   URINEPH 6.5   PROTEIN 30*   NITRITE Positive*   LEUKEST Large*          Assessment and Plan:   73 year old is a pleasant female who has neurogenic bladder with hx of spinal cord injury and spinal surgery, ISD, NEFTALI and FI    Plan:  - UA positive for nitrites, leukocytes and RBC. Will send for culture. Has tolerated keflex without issues in the past. Advised to start empirical treatment with keflex 500mg BID x7 days and may adjust based on final UC results. Advise to stop medication and go to ER if develops hives, swelling of face/tongue, trouble breathing or swallowing, fevers/chills, gross hematuria, flank pain.  -  Will have patient trial pessary ring #2. Will try and order pessary ring #1 in case develops irritation/bleeding or other issues with pessary.   - Follow-up in 2 weeks for pessary check.   - After discussing the assessment and plan with patient, patient verbalizes understanding and agrees to the above plan. All questions answered.       Other orders as below:  No orders of the defined types were placed in this encounter.     36 minutes spent on the date of the encounter doing chart review, review of outside records, review of test results, interpretation of tests, patient visit and documentation.      Massiel Berrios PA-C  Urology  May 15, 2023      Patient Care Team:  Arsalan Blas,  MD as PCP - General  Keron Valencia MD as MD (Neurology)  Keron Valencia MD as Assigned Neuroscience Provider  Charlotte Chu PA-C as Physician Assistant (Urology)  Ivonne Shaikh MD as Assigned Surgical Provider  Arlin Gutiérrez MD as MD (Physical Medicine and Rehabilitation)

## 2023-05-15 NOTE — PATIENT INSTRUCTIONS
- follow-up in 2 weeks for pessary check.     - go to ER if develops hives, swelling of face/tongue, trouble breathing or swallowing, fevers/chills, blood in urine, flank pain.  _______________________________  Pessary management:    - A pessary is a flexible silicone ring that is placed in the vagina to help with vaginal prolapse.   - Remove pessary nightly and prior to sexual intercourse.   - If unable to remove the pessary or if you don't feel comfortable with removing the pessary on your own, we can remove/clean your pessary every 3 months for you in clinic.   - Clean pessary with unscented soap and water once removed.   - You can use an over the counter lubricant to help with placing pessary.  - It can be normal to have an increase in vaginal discharge with pessary in place. However, if you remove/clean your pessary nightly, this should help with having less discharge.   - Contact clinic if develop any vaginal pain, bleeding, or other issues with pessary.  - Sometimes when baring down, having a bowel movement, lifting heavy objects the pessary can shift. If this occurs simply use your fingers to push the pessary back up into place.    - If unable to void or have BM with pessary in place contact clinic right away or go to Urgent Care or ER to have pessary removed.

## 2023-05-15 NOTE — NURSING NOTE
Jenna Jhaveri's goals for this visit include:   Chief Complaint   Patient presents with     RECHECK     Pessary and cathed UA        She requests these members of her care team be copied on today's visit information:       PCP: Arsalan Blas    Referring Provider:  No referring provider defined for this encounter.    There were no vitals taken for this visit.    Do you need any medication refills at today's visit?     Bailey Stuart LPN on 5/15/2023 at 9:13 AM

## 2023-05-17 ENCOUNTER — MYC MEDICAL ADVICE (OUTPATIENT)
Dept: UROLOGY | Facility: CLINIC | Age: 73
End: 2023-05-17
Payer: COMMERCIAL

## 2023-05-17 ENCOUNTER — TELEPHONE (OUTPATIENT)
Dept: UROLOGY | Facility: CLINIC | Age: 73
End: 2023-05-17
Payer: COMMERCIAL

## 2023-05-17 DIAGNOSIS — N39.0 URINARY TRACT INFECTION WITHOUT HEMATURIA, SITE UNSPECIFIED: ICD-10-CM

## 2023-05-17 DIAGNOSIS — N39.0 URINARY TRACT INFECTION WITHOUT HEMATURIA, SITE UNSPECIFIED: Primary | ICD-10-CM

## 2023-05-17 LAB — BACTERIA UR CULT: ABNORMAL

## 2023-05-17 RX ORDER — SULFAMETHOXAZOLE/TRIMETHOPRIM 800-160 MG
1 TABLET ORAL 2 TIMES DAILY
Qty: 14 TABLET | Refills: 0 | Status: SHIPPED | OUTPATIENT
Start: 2023-05-17 | End: 2023-05-17

## 2023-05-17 RX ORDER — SULFAMETHOXAZOLE/TRIMETHOPRIM 800-160 MG
1 TABLET ORAL 2 TIMES DAILY
Qty: 14 TABLET | Refills: 0 | Status: SHIPPED | OUTPATIENT
Start: 2023-05-17 | End: 2023-09-22

## 2023-05-17 NOTE — TELEPHONE ENCOUNTER
Received message from call center that pt picked up medication.    Called pt to verify she will take for 7 days twice a day.    Pt confirmed.    Kiara Karimi RN

## 2023-05-17 NOTE — TELEPHONE ENCOUNTER
Massiel Berrios, SAM  P [facility] [place]  Patient has UTI. Rx for bactrim DS BID x7 days. Please notify patient of this.     chinmay       Called and left generic  for pt.  Also sending [employer] message.    Kiara Karimi RN

## 2023-06-05 ENCOUNTER — OFFICE VISIT (OUTPATIENT)
Dept: UROLOGY | Facility: CLINIC | Age: 73
End: 2023-06-05
Payer: COMMERCIAL

## 2023-06-05 DIAGNOSIS — N36.42 INTRINSIC SPHINCTER DEFICIENCY (ISD): Primary | ICD-10-CM

## 2023-06-05 DIAGNOSIS — R15.9 INCONTINENCE OF FECES, UNSPECIFIED FECAL INCONTINENCE TYPE: ICD-10-CM

## 2023-06-05 DIAGNOSIS — Z98.890 S/P SPINAL SURGERY: ICD-10-CM

## 2023-06-05 DIAGNOSIS — N31.9 NEUROGENIC BLADDER: ICD-10-CM

## 2023-06-05 DIAGNOSIS — N39.46 MIXED INCONTINENCE: ICD-10-CM

## 2023-06-05 PROCEDURE — 99213 OFFICE O/P EST LOW 20 MIN: CPT | Performed by: PHYSICIAN ASSISTANT

## 2023-06-05 NOTE — PROGRESS NOTES
Urology Clinic      Name: Jenna Jhaveri    MRN: [id number]   YOB: 1950  Accompanied at today's visit by:self                 Chief Complaint:   Pessary check.           History of Present Illness:   June 5, 2023    HISTORY:   We have been following 73 year old Jenna hJaveri for neurogenic bladder with hx of spinal cord injury and spinal surgery, ISD and FI. Have discussed PFPT, pessary management andurtheral bulking. Patient wanted to undergo conservative therapies first. Last seen on 5/15/23 for pessary ring #2 placement. Here today for follow-up. Denies vaginal pain, bleeding or issues with pessary. However, has noticed tailbone pain and rectal pressure at times. Symptoms will improve after large BM. Does not have feeling of defecation and will notice the stool in the toilet. Also reports no improvement of urinary incontinence with pessary in place. Taking metamucil and stool softer. Has not started PFPT yet. Denies any s/s of UTI today, but wanted to know if needs to have urine checked since she had a recent UTI and completed treatment for this. Hx of benign essential tremor, VICKI, restrictive lung disease complicated by chronic hypoxemic respiratory failure on home O2, and psoriasis. Patient voices no other concerns at this time           Allergies:     Allergies   Allergen Reactions     Potassium Shortness Of Breath and Palpitations     IV Potassium     Statins [Statins] Other (See Comments)     Statin induced myopathy     Penicillin G Swelling     Latex Hives     NO REACTION BUT SCREENING SCORE OF 6     Other Drug Allergy (See Comments) Other (See Comments)     Statin induced myopathy            Medications:     Current Outpatient Medications   Medication Sig     acetaminophen (TYLENOL) 500 MG tablet Take 500 mg by mouth every 6 hours     albuterol (PROAIR HFA/PROVENTIL HFA/VENTOLIN HFA) 108 (90 Base) MCG/ACT inhaler Inhale 2 puffs into the lungs every 6 hours as needed for shortness of  breath / dyspnea or wheezing     albuterol (PROVENTIL) (2.5 MG/3ML) 0.083% neb solution USE 1 VIAL IN NEBULIZER EVERY 4 HOURS AS NEEDED FOR SHORTNESS OF BREATH COUGH OR WITH EXERCISE     alendronate (FOSAMAX) 35 MG tablet Take 1 tablet (35 mg) by mouth every 7 days     alendronate (FOSAMAX) 70 MG tablet Take 70 mg by mouth once a week     amLODIPine (NORVASC) 10 MG tablet Take 10 mg by mouth     aspirin (ASA) 81 MG chewable tablet Take 81 mg by mouth     azaTHIOprine (IMURAN) 50 MG tablet TAKE 2 TABLETS BY MOUTH IN THE MORNING AND 1 TABLET IN THE EVENING     azaTHIOprine (IMURAN) 50 MG tablet Take 50 mg by mouth 2 times daily     bisacodyl (DULCOLAX) 10 MG suppository Place 10 mg rectally daily as needed for constipation     Calcium-Vitamin D-Vitamin K 500-200-40 MG-UNT-MCG CHEW 650 Ca - 500 D - 40 K     cephALEXin (KEFLEX) 500 MG capsule Take 1 capsule (500 mg) by mouth 2 times daily     cholecalciferol 50 MCG (2000 UT) tablet Take 2,000 Units by mouth      DULoxetine (CYMBALTA) 20 MG capsule      DULoxetine HCl 40 MG CPEP      fluocinonide (LIDEX) 0.05 % external solution      fluticasone (FLONASE) 50 MCG/ACT nasal spray Spray 1 spray in nostril     fluticasone-salmeterol (ADVAIR) 250-50 MCG/DOSE inhaler      gabapentin (NEURONTIN) 300 MG capsule Take 300 mg by mouth 3 times daily     ipratropium (ATROVENT) 0.06 % nasal spray Spray 2 sprays into both nostrils 4 times daily     ipratropium - albuterol 0.5 mg/2.5 mg/3 mL (DUONEB) 0.5-2.5 (3) MG/3ML neb solution Inhale 3 mLs into the lungs      ketoconazole (NIZORAL) 2 % external shampoo      loratadine (CLARITIN) 10 MG tablet Take 10 mg by mouth     melatonin 3 MG CAPS      methocarbamol (ROBAXIN) 500 MG tablet Take 500 mg by mouth     Omega-3 Fatty Acids (FISH OIL OMEGA-3 PO)      omeprazole (PRILOSEC) 20 MG DR capsule Take 1 capsule by mouth once daily     omeprazole (PRILOSEC) 40 MG DR capsule Take 40 mg by mouth     oxyCODONE (ROXICODONE) 5 MG tablet Take 5-10  mg by mouth     Probiotic Product (PROBIOTIC-10 PO) Take by mouth daily probio - 5     sulfamethoxazole-trimethoprim (BACTRIM DS) 800-160 MG tablet Take 1 tablet by mouth 2 times daily     XARELTO ANTICOAGULANT 20 MG TABS tablet      zinc sulfate (ZINCATE) 220 (50 Zn) MG capsule Take 220 mg by mouth daily     atenolol (TENORMIN) 50 MG tablet Take 50 mg by mouth daily (Patient not taking: Reported on 1/26/2022)     atorvastatin (LIPITOR) 20 MG tablet Take 20 mg by mouth daily (Patient not taking: Reported on 1/26/2022)     baclofen (LIORESAL) 10 MG tablet Take 10 mg by mouth 2 times daily around 3 pm and at bedtime. (Patient not taking: Reported on 3/20/2023)     baclofen (LIORESAL) 10 MG tablet 1/2 TAB (5MG) AT 6PM (Patient not taking: Reported on 3/20/2023)     cyclobenzaprine (FLEXERIL) 10 MG tablet Take 5 mg by mouth (Patient not taking: Reported on 8/26/2021)     furosemide (LASIX) 20 MG tablet Take 20 mg by mouth daily (Patient not taking: Reported on 3/20/2023)     HYDROcodone-acetaminophen (NORCO)  MG per tablet 3 X DAILY (Patient not taking: Reported on 1/26/2022)     HYDROcodone-acetaminophen (NORCO) 5-325 MG tablet  (Patient not taking: Reported on 3/20/2023)     ibuprofen (ADVIL/MOTRIN) 600 MG tablet limit use to 2 x per week (Patient not taking: Reported on 3/20/2023)     Immune Globulin, Human, 30 GM/300ML SOLN every 28 days (Patient not taking: Reported on 3/20/2023)     predniSONE (DELTASONE) 10 MG tablet Take 1 10 mg tablet daily for two weeks. Then take 1 10 mg tablet every other day for two weeks. Then stop. (Patient not taking: Reported on 1/26/2022)     predniSONE (DELTASONE) 20 MG tablet TAKE 1 TABLET BY MOUTH TWICE DAILY WITH MEALS FOR 5 DAYS (Patient not taking: No sig reported)     primidone (MYSOLINE) 50 MG tablet Take 50 mg by mouth     propranolol (INDERAL) 10 MG tablet Take 10 mg by mouth three times daily as needed (tremor).     propranolol ER (INDERAL LA) 60 MG 24 hr capsule Take  60 mg by mouth      No current facility-administered medications for this visit.             Past  Surgical History:   No past surgical history on file.          Physical Exam:   There were no vitals filed for this visit.  PSYCH: NAD  EYES: EOMI  NEURO: AAO x3  : pessary removed without issues. No obvious lesions,ulcers, bleeding, abnormal discharge on speculum exam. Pessary kept out.         Assessment and Plan:   73 year old is a pleasant female who has neurogenic bladder with hx of spinal cord injury and spinal surgery, ISD and FI.    Plan:  - Think pessary size may have affected bowels. However this pessary size did not help her with incontinence, and do not think a smaller size would help her.   - Start PFPT.  - Plan to follow-up in 3-4 months  - Contact clinic if develops s/s of UTI in the future. Do not recommend test of cure urine samples.   - Discussed bulking agent; patient wants to continue with PFPT for now.   - Continue with metamucil and stool softener for now.   - After discussing the assessment and plan with patient, patient verbalizes understanding and agrees to the above plan. All questions answered.     21 minutes spent on the date of the encounter doing chart review, review of outside records, review of test results, interpretation of tests, patient visit and documentation.      Massiel Berrios PA-C  Urology  June 5, 2023      Patient Care Team:  Arsalan Blas MD as PCP - General  Keron Valencia MD as MD (Neurology)  Keron Valencia MD as Assigned Neuroscience Provider  Charlotte Chu PA-C as Physician Assistant (Urology)  Ivonne Shaikh MD as Assigned Surgical Provider  Arlin Gutiérrez MD as MD (Physical Medicine and Rehabilitation)

## 2023-06-05 NOTE — NURSING NOTE
Jenna Jhaveri's chief complaint for this visit includes:    Chief Complaint   Patient presents with     RECHECK     2 week pessary check       PCP: Arsalan Blas         Referring Provider:    No referring provider defined for this encounter.         There were no vitals taken for this visit.    Data Unavailable            Allergies   Allergen Reactions     Potassium Shortness Of Breath and Palpitations     IV Potassium     Statins [Statins] Other (See Comments)     Statin induced myopathy     Penicillin G Swelling     Latex Hives     NO REACTION BUT SCREENING SCORE OF 6     Other Drug Allergy (See Comments) Other (See Comments)     Statin induced myopathy                 Do you need any medication refills at today's visit? No    Carrie Razo LPN on 6/5/2023 at 11:08 AM

## 2023-06-15 ENCOUNTER — THERAPY VISIT (OUTPATIENT)
Dept: PHYSICAL THERAPY | Facility: CLINIC | Age: 73
End: 2023-06-15
Attending: UROLOGY
Payer: COMMERCIAL

## 2023-06-15 DIAGNOSIS — Z87.828 HISTORY OF SPINAL CORD INJURY: ICD-10-CM

## 2023-06-15 DIAGNOSIS — N31.9 NEUROGENIC BLADDER: ICD-10-CM

## 2023-06-15 DIAGNOSIS — Z98.890 S/P SPINAL SURGERY: ICD-10-CM

## 2023-06-15 DIAGNOSIS — R15.9 INCONTINENCE OF FECES, UNSPECIFIED FECAL INCONTINENCE TYPE: ICD-10-CM

## 2023-06-15 PROCEDURE — 97110 THERAPEUTIC EXERCISES: CPT | Mod: GP | Performed by: PHYSICAL THERAPIST

## 2023-06-15 PROCEDURE — 97163 PT EVAL HIGH COMPLEX 45 MIN: CPT | Mod: GP | Performed by: PHYSICAL THERAPIST

## 2023-06-15 NOTE — PROGRESS NOTES
PHYSICAL THERAPY EVALUATION  Type of Visit: Evaluation    See electronic medical record for Abuse and Falls Screening details.    Subjective      Presenting condition or subjective complaint: Patient had surgery in 1997 for her back for scoliosis and had bars placed and multiple fusions. Then bent over about a year later broke some bars surgery 1998 and good for about 20 years. Then 2020 noticed weakness in her leg and arms. 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 But hard for mm's to hold her up and bad pain in the left buttock. broke bars and broken bone in the low back and dural injury. Then May 5/17/22 removed hardware and added new titanium hardware. After this surgery does not have the urge to pee, no urge for poop. And cannot feel when the feces comes out. But has severe rectal pain that is random, however feels the rectal pain first then has a sensation of bubbles in the lower abdomen but the bubble sensation does NOT always happen when the rectal pain comes on,  and is about 2-4x per wk. This pain can last the entire day or hours, never goes away quickly. Patient takes Oxycodone for LBP, right hip pain and right groin pain. Then had severe right hip pain and found that one of the screws was pushing into a nerve so on 2/14/23 removed a screw. (after this surgery minimal relief in the right hip pain and no change in all of the bowel/bladder complaints). Bladder: no urge, constant leaking all day, sitting on the toilet does not help, wears large diapers and goes through 10 per day. Multiple UTI's. Bowels: no urge, but when gets the bubble sensation pt goes to the bathroom and uses squatty potty and places pressure on her heals and sometimes feces comes out and full toilet of poop [place] #4, this can take about 30 min. But the bubble feeling does not always produce a BM. It will produce a good BM every 4th day. No poops between there. [place]22 and PT had her do solid suppository in the am but it did not stimulate an urge and she would just have a slimmy mess of poop in the diaper. Presently for the leaking of urine Dr ana rodriguez and this did not help. Patient wants to control bowel and urine to be able to go places. Patient is on 3L of O2 when she is up and around.  Patient has exhausted a [employer] program for months with no change in sx's or incontinence.   Date of onset: 05/17/22    Relevant medical history:    HX of pulmonary emboli and on O2 when up and around.   Dates & types of surgery:      Prior diagnostic imaging/testing results:      See Epic multiple testing    Prior Level of Function   Transfers: Independent, Assistive equipment  Ambulation: Assistive equipment      Living Environment  Type of home:   house  Equipment owned:    4ww walker           Objective      PELVIC EVALUATION    Bladder History: see subjective    Bowel History:  Frequency of bowel movement:   see subjective    Pelvic pain:     see subjective         Discussed reason for referral regarding pelvic health needs and external/internal pelvic floor muscle examination with patient/guardian.  Opportunity provided to ask questions and verbal consent for assessment and intervention was given.        PELVIC EXAM  External Visual Inspection:  With visual assessment of the PF no activation of the pelvic floor mm's, compensating with gluts and posterior pelvic tilt        External Digital Palpation per Perineum:   Patient has no sensation to touch in either buttocks or the entire perineum. With vaginal palpation again no sensation noted. Only slight lift deep vaginal with a TA contraction.     Anal [place]dig[place]:  Absent       Pelvic Organ Prolapse:   none noted but pt is unable to bear down at the PF effectively    ABDOMINAL ASSESSMENT      Abdominal Activation/Strength: Slight activation of the TA with cueing      BIOFEEDBACK:  Will do next session    Assessment & Plan   CLINICAL  IMPRESSIONS   Medical Diagnosis: Neurogenic bladder (N31.9)     S/P spinal surgery (Z98.890)     History of spinal cord injury (Z87.828)     Incontinence of feces, unspecified fecal incontinence type (R15.9)    Treatment Diagnosis: urinary incontinence, fecal incontinence, neurogenic bladder, pelvic floor dysfunction   Impression/Assessment: Patient has no sensation of the pelvic floor or buttock region. No sensation vaginally. No anal wink present. No pelvic floor activation. However pt does have slight lift deep perineal with TA activation.     Clinical Decision Making (Complexity):   Clinical Presentation: Evolving/Changing  Clinical Presentation Rationale: based on medical and personal factors listed in PT evaluation  Clinical Decision Making (Complexity): High complexity    PLAN OF CARE  Treatment Interventions:  Modalities: Biofeedback  Interventions: Manual Therapy, Neuromuscular Re-education, Therapeutic Activity, Therapeutic Exercise, Self-Care/Home Management    Long Term Goals     PT Goal 1  Goal Identifier: 1  Goal Description: Patient has the urge for urination and is able to urinate on the toilet at the time on the urge, and 50% less urine leaking. 50% less pad use.  Target Date: 09/12/23  PT Goal 2  Goal Identifier: 2  Goal Description: Patient has urge for BM, can have BM on the toilet every other day [place] #4 and no fecal incontinence in pad  Target Date: 09/12/23  PT Goal 3  Goal Identifier: 3  Goal Description: Patient no longer has rectal pain  Target Date: 09/12/23      Frequency of Treatment: 1x every 1-3 wks  Duration of Treatment: 90 days      Education Assessment:   Learner/Method: Patient;Family;Listening;Reading;Demonstration    Risks and benefits of evaluation/treatment have been explained.   Patient/Family/caregiver agrees with Plan of Care.     Evaluation Time:     PT Eval, High Complexity Minutes (83348): 45      Signing Clinician: Jessica Stover, PT      [facility]  Rehabilitation Services                                                                                   OUTPATIENT PHYSICAL THERAPY      PLAN OF TREATMENT FOR OUTPATIENT REHABILITATION   Patient's Last Name, First Name, Jenna Salazar YOB: 1950   Provider's Name   [facility]   Medical Record No.  [id number]     Onset Date: 05/17/22  Start of Care Date: 06/15/23     Medical Diagnosis:  Neurogenic bladder (N31.9)     S/P spinal surgery (Z98.890)     History of spinal cord injury (Z87.828)     Incontinence of feces, unspecified fecal incontinence type (R15.9)      PT Treatment Diagnosis:  urinary incontinence, fecal incontinence, neurogenic bladder, pelvic floor dysfunction Plan of Treatment  Frequency/Duration: 1x every 1-3 wks/ 90 days    Certification date from 06/15/23 to 09/12/23         See note for plan of treatment details and functional goals     Jessica Stoevr, PT                         I CERTIFY THE NEED FOR THESE SERVICES FURNISHED UNDER        THIS PLAN OF TREATMENT AND WHILE UNDER MY CARE     (Physician attestation of this document indicates review and certification of the therapy plan).                  Referring Provider:  Ivonne Shaikh      Initial Assessment  See Epic Evaluation- Start of Care Date: 06/15/23

## 2023-06-29 ENCOUNTER — THERAPY VISIT (OUTPATIENT)
Dept: PHYSICAL THERAPY | Facility: CLINIC | Age: 73
End: 2023-06-29
Attending: UROLOGY
Payer: COMMERCIAL

## 2023-06-29 DIAGNOSIS — R15.9 INCONTINENCE OF FECES, UNSPECIFIED FECAL INCONTINENCE TYPE: Primary | ICD-10-CM

## 2023-06-29 DIAGNOSIS — N31.9 NEUROGENIC BLADDER: ICD-10-CM

## 2023-06-29 PROCEDURE — 97110 THERAPEUTIC EXERCISES: CPT | Mod: GP | Performed by: PHYSICAL THERAPIST

## 2023-06-29 PROCEDURE — 97530 THERAPEUTIC ACTIVITIES: CPT | Mod: GP,59 | Performed by: PHYSICAL THERAPIST

## 2023-06-29 PROCEDURE — 90912 BFB TRAINING 1ST 15 MIN: CPT | Mod: GP | Performed by: PHYSICAL THERAPIST

## 2023-06-29 PROCEDURE — 90913 BFB TRAINING EA ADDL 15 MIN: CPT | Mod: GP | Performed by: PHYSICAL THERAPIST

## 2023-07-06 ENCOUNTER — THERAPY VISIT (OUTPATIENT)
Dept: PHYSICAL THERAPY | Facility: CLINIC | Age: 73
End: 2023-07-06
Attending: UROLOGY
Payer: COMMERCIAL

## 2023-07-06 DIAGNOSIS — N31.9 NEUROGENIC BLADDER: ICD-10-CM

## 2023-07-06 DIAGNOSIS — R15.9 INCONTINENCE OF FECES, UNSPECIFIED FECAL INCONTINENCE TYPE: Primary | ICD-10-CM

## 2023-07-06 PROCEDURE — 97530 THERAPEUTIC ACTIVITIES: CPT | Mod: GP | Performed by: PHYSICAL THERAPIST

## 2023-07-17 DIAGNOSIS — G72.49 AUTOIMMUNE NECROTIZING MYOPATHY: ICD-10-CM

## 2023-07-17 RX ORDER — AZATHIOPRINE 50 MG/1
TABLET ORAL
Qty: 90 TABLET | Refills: 0 | Status: SHIPPED | OUTPATIENT
Start: 2023-07-17 | End: 2023-09-05

## 2023-07-25 ENCOUNTER — THERAPY VISIT (OUTPATIENT)
Dept: PHYSICAL THERAPY | Facility: CLINIC | Age: 73
End: 2023-07-25
Attending: UROLOGY
Payer: COMMERCIAL

## 2023-07-25 DIAGNOSIS — N31.9 NEUROGENIC BLADDER: ICD-10-CM

## 2023-07-25 DIAGNOSIS — R15.9 INCONTINENCE OF FECES, UNSPECIFIED FECAL INCONTINENCE TYPE: Primary | ICD-10-CM

## 2023-07-25 PROCEDURE — 97530 THERAPEUTIC ACTIVITIES: CPT | Mod: GP,XU | Performed by: PHYSICAL THERAPIST

## 2023-07-25 PROCEDURE — 97110 THERAPEUTIC EXERCISES: CPT | Mod: GP | Performed by: PHYSICAL THERAPIST

## 2023-07-25 PROCEDURE — 90912 BFB TRAINING 1ST 15 MIN: CPT | Mod: GP | Performed by: PHYSICAL THERAPIST

## 2023-08-16 ENCOUNTER — THERAPY VISIT (OUTPATIENT)
Dept: PHYSICAL THERAPY | Facility: CLINIC | Age: 73
End: 2023-08-16
Attending: UROLOGY
Payer: COMMERCIAL

## 2023-08-16 DIAGNOSIS — N31.9 NEUROGENIC BLADDER: ICD-10-CM

## 2023-08-16 DIAGNOSIS — R15.9 INCONTINENCE OF FECES, UNSPECIFIED FECAL INCONTINENCE TYPE: Primary | ICD-10-CM

## 2023-08-16 PROCEDURE — 97110 THERAPEUTIC EXERCISES: CPT | Mod: GP | Performed by: PHYSICAL THERAPIST

## 2023-08-22 ENCOUNTER — VIRTUAL VISIT (OUTPATIENT)
Dept: UROLOGY | Facility: CLINIC | Age: 73
End: 2023-08-22
Payer: COMMERCIAL

## 2023-08-22 VITALS — WEIGHT: 199 LBS | BODY MASS INDEX: 33.97 KG/M2 | HEIGHT: 64 IN

## 2023-08-22 DIAGNOSIS — N31.9 NEUROGENIC BLADDER: ICD-10-CM

## 2023-08-22 DIAGNOSIS — G83.4 CAUDA EQUINA COMPRESSION (H): ICD-10-CM

## 2023-08-22 DIAGNOSIS — Z98.890 S/P SPINAL SURGERY: ICD-10-CM

## 2023-08-22 DIAGNOSIS — N36.42 INTRINSIC SPHINCTER DEFICIENCY (ISD): Primary | ICD-10-CM

## 2023-08-22 DIAGNOSIS — R32 BOWEL AND BLADDER INCONTINENCE: ICD-10-CM

## 2023-08-22 DIAGNOSIS — R15.9 BOWEL AND BLADDER INCONTINENCE: ICD-10-CM

## 2023-08-22 PROCEDURE — 99214 OFFICE O/P EST MOD 30 MIN: CPT | Mod: VID | Performed by: PHYSICIAN ASSISTANT

## 2023-08-22 RX ORDER — MULTIPLE VITAMINS W/ MINERALS TAB 9MG-400MCG
1 TAB ORAL DAILY
COMMUNITY

## 2023-08-22 ASSESSMENT — PAIN SCALES - GENERAL: PAINLEVEL: NO PAIN (0)

## 2023-08-22 NOTE — PROGRESS NOTES
Pt will meet you in [employer]    [place] is a 73 year old who is being evaluated via a billable video visit.      How would you like to obtain your AVS? [facility]  If the video visit is dropped, the invitation should be resent by: Text to cell phone: [phone]  Will anyone else be joining your video visit? No        Video-Visit Details    Type of service:  Video Visit   Video Start Time:  10:28am  Video End Time:10:46 AM    Originating Location (pt. Location): Home in [place]    Distant Location (provider location):  On-site  Platform used for Video Visit: [facility]     Urology Clinic    Name: Jenna Jhaveri    MRN: [id number]   YOB: 1950  Accompanied at today's visit by:self              Assessment and Plan:   73 year old female with neurogenic bladder s/p spinal surgery from spinal cord injury, ISD, NEFTALI, FI    - Failed pessary and PFPT did not seem to improve her incontinence. Most bothersome bladder symptoms think secondary to ISD. Discussed possible periurethral bulking.  - Will have patient follow-up with Dr. Shaikh and PVR check.   - Continues to have issues with defecation and fecal incontinence. Referral to colorectal surgery placed.   - Question if may benefit from SNS device for neurogenic bladder and FI issues.   - Continue PT exercises.   - Continue bowel regimen for now.   - After discussing the assessment and plan with patient, patient verbalizes understanding and agrees to the above plan. All questions answered.     Orders Placed This Encounter   Procedures    Adult Colorectal Surgery  Referral     After discussing the assessment and plan with patient, patient verbalized understanding and agreed to the above plan. All questions answered.     37 minutes were spent today on the date of the encounter in reviewing the EMR, direct patient care, coordination of care and documentation, reviewing VUDS resutls from 4/12/23.     Massiel Berrios PA-C  August 22, 2023    Patient Care Team:  [place]  Arsalan Castelan MD as PCP - General  Keron Valencia MD as MD (Neurology)  Keron Valencia MD as Assigned Neuroscience Provider  Charlotte Chu PA-C as Physician Assistant (Urology)  Ivonne Shaikh MD as Assigned Surgical Provider  Arlin Gutiérrez MD as MD (Physical Medicine and Rehabilitation)            Chief Complaint:   Neurogenic bladder with hx of spinal cord injury/surgery, ISD and FI          History of Present Illness:   August 22, 2023    HISTORY: Jenna Jhaveri is a 73 year old female is here for follow-up. We have been following her for neurogenic bladder with hx of spinal cord injury and spinal surgery, ISD and FI. Had VUDS on 4/12/23, showing bladder capacity of 350mL, good compliance and no detrusor overactivity. She had minimal detrusor function and has USI starting at low bladder volumes. She then saw Dr. Shaikh on 4/25/23 and was advised to follow-up for pessary placement and referred to PFPT. Seen on 5/15/23 and was fitted with pessary ring #2. Had follow-up on 6/5/23 and did not think the pessary was helping with her bladder symptoms and affecting her bowels, so pessary was removed and advised to start PFPT. Here today for follow-up. States PFPT has helped some with her bladder and bowel symptoms, however continues to have FI without feeling rectal urgency prior to FI. Taking metamucil. Has not seen GI. Is mostly bothered by her FI symptoms and does not leave the house due to concern of having FI. Most bothersome bladder symptoms are incontinence with sitting to standing and when walking. Thinks she is able to void better since starting PT. Hx of benign essential tremor, VICKI, restrictive lung disease complicated by chronic hypoxemic respiratory failure on home O2, and psoriasis. Patient voices no other concerns at this time.           Past Medical History:   History reviewed. No pertinent past medical history.         Past Surgical History:   History  reviewed. No pertinent surgical history.         Social History:     Social History     Tobacco Use    Smoking status: Former     Types: Cigarettes     Quit date:      Years since quittin.6    Smokeless tobacco: Never   Substance Use Topics    Alcohol use: Not on file            Family History:   No family history on file.           Allergies:     Allergies   Allergen Reactions    Potassium Shortness Of Breath and Palpitations     IV Potassium    Statins [Statins] Other (See Comments)     Statin induced myopathy    Penicillin G Swelling    Latex Hives     NO REACTION BUT SCREENING SCORE OF 6    Other Drug Allergy (See Comments) Other (See Comments) and Muscle Pain (Myalgia)     Statin induced myopathy            Medications:     Current Outpatient Medications   Medication Sig    acetaminophen (TYLENOL) 500 MG tablet Take 500 mg by mouth every 6 hours    albuterol (PROAIR HFA/PROVENTIL HFA/VENTOLIN HFA) 108 (90 Base) MCG/ACT inhaler Inhale 2 puffs into the lungs every 6 hours as needed for shortness of breath / dyspnea or wheezing    albuterol (PROVENTIL) (2.5 MG/3ML) 0.083% neb solution USE 1 VIAL IN NEBULIZER EVERY 4 HOURS AS NEEDED FOR SHORTNESS OF BREATH COUGH OR WITH EXERCISE    alendronate (FOSAMAX) 35 MG tablet Take 1 tablet (35 mg) by mouth every 7 days    alendronate (FOSAMAX) 70 MG tablet Take 70 mg by mouth once a week    amLODIPine (NORVASC) 10 MG tablet Take 10 mg by mouth    aspirin (ASA) 81 MG chewable tablet Take 81 mg by mouth    azaTHIOprine (IMURAN) 50 MG tablet TAKE 2 TABLETS BY MOUTH IN THE MORNING AND 1 IN THE EVENING    azaTHIOprine (IMURAN) 50 MG tablet Take 50 mg by mouth 2 times daily    bisacodyl (DULCOLAX) 10 MG suppository Place 10 mg rectally daily as needed for constipation    Calcium-Vitamin D-Vitamin K 500-200-40 MG-UNT-MCG CHEW 650 Ca - 500 D - 40 K    cholecalciferol 50 MCG (2000 UT) tablet Take 2,000 Units by mouth     cyclobenzaprine (FLEXERIL) 10 MG tablet Take 5 mg by  mouth    DULoxetine (CYMBALTA) 20 MG capsule     DULoxetine HCl 40 MG CPEP     fluocinonide (LIDEX) 0.05 % external solution     fluticasone (FLONASE) 50 MCG/ACT nasal spray Spray 1 spray in nostril    fluticasone-salmeterol (ADVAIR) 250-50 MCG/DOSE inhaler     furosemide (LASIX) 20 MG tablet Take 20 mg by mouth daily    gabapentin (NEURONTIN) 300 MG capsule Take 300 mg by mouth 3 times daily    HYDROcodone-acetaminophen (NORCO)  MG per tablet 3 X DAILY    HYDROcodone-acetaminophen (NORCO) 5-325 MG tablet     ibuprofen (ADVIL/MOTRIN) 600 MG tablet     Immune Globulin, Human, 30 GM/300ML SOLN every 28 days    ipratropium (ATROVENT) 0.06 % nasal spray Spray 2 sprays into both nostrils 4 times daily    ipratropium - albuterol 0.5 mg/2.5 mg/3 mL (DUONEB) 0.5-2.5 (3) MG/3ML neb solution Inhale 3 mLs into the lungs     ketoconazole (NIZORAL) 2 % external shampoo     loratadine (CLARITIN) 10 MG tablet Take 10 mg by mouth    melatonin 3 MG CAPS     methocarbamol (ROBAXIN) 500 MG tablet Take 500 mg by mouth    Omega-3 Fatty Acids (FISH OIL OMEGA-3 PO)     omeprazole (PRILOSEC) 20 MG DR capsule Take 1 capsule by mouth once daily    omeprazole (PRILOSEC) 40 MG DR capsule Take 40 mg by mouth    Probiotic Product (PROBIOTIC-10 PO) Take by mouth daily probio - 5    sulfamethoxazole-trimethoprim (BACTRIM DS) 800-160 MG tablet Take 1 tablet by mouth 2 times daily    XARELTO ANTICOAGULANT 20 MG TABS tablet     zinc sulfate (ZINCATE) 220 (50 Zn) MG capsule Take 220 mg by mouth daily    atenolol (TENORMIN) 50 MG tablet Take 50 mg by mouth daily (Patient not taking: Reported on 1/26/2022)    atorvastatin (LIPITOR) 20 MG tablet Take 20 mg by mouth daily (Patient not taking: Reported on 1/26/2022)    baclofen (LIORESAL) 10 MG tablet Take 10 mg by mouth 2 times daily around 3 pm and at bedtime. (Patient not taking: Reported on 3/20/2023)    baclofen (LIORESAL) 10 MG tablet 1/2 TAB (5MG) AT 6PM (Patient not taking: Reported on  "3/20/2023)    cephALEXin (KEFLEX) 500 MG capsule Take 1 capsule (500 mg) by mouth 2 times daily (Patient not taking: Reported on 8/22/2023)    multivitamin w/minerals (MULTI-VITAMIN) tablet     nitrous oxide/oxygen 50/50 blend 1 application. by inhalation route as directed. 3 lpm continuous    oxyCODONE (ROXICODONE) 5 MG tablet Take 5-10 mg by mouth (Patient not taking: Reported on 8/22/2023)    predniSONE (DELTASONE) 10 MG tablet Take 1 10 mg tablet daily for two weeks. Then take 1 10 mg tablet every other day for two weeks. Then stop. (Patient not taking: Reported on 1/26/2022)    predniSONE (DELTASONE) 20 MG tablet TAKE 1 TABLET BY MOUTH TWICE DAILY WITH MEALS FOR 5 DAYS (Patient not taking: No sig reported)    primidone (MYSOLINE) 50 MG tablet Take 50 mg by mouth    propranolol (INDERAL) 10 MG tablet Take 10 mg by mouth three times daily as needed (tremor).    propranolol ER (INDERAL LA) 60 MG 24 hr capsule Take 60 mg by mouth      No current facility-administered medications for this visit.             Review of Systems:    ROS: 14 point ROS neg other than the symptoms noted above in the HPI.          Physical Exam:   Height 1.619 m (5' 3.75\"), weight 90.3 kg (199 lb).  5' 3.75\", Body mass index is 34.43 kg/m ., 199 lbs 0 oz  Gen appearance: Age-appropriate appearing female in NAD.   HEENT:  EOMI. Normal ROM of neck for age.   Psych:  alert , In no acute distress.  Neuro:  A&Ox3  Resp:  Normal respiratory effort; not in acute respiratory distress.     VUDS 4/12/23  POSTPROCEDURE DIAGNOSES:  -Maximum cystometric capacity ~350 mL with absent filling sensations.  -Good bladder compliance without detrusor overactivity. There is a very minimal and gradual increase in detrusor pressure at volumes >200 mL. She leaks when PDet reaches ~6 cm H2O around 350 mL with near continuous urinary incontinence after this point.  -Multiple stress leaks at Pabd pressures ranging from 51-71 cm H2O starting at bladder volumes ~200 " mL. Low ALPP suggestive for ISD.  -No appreciable detrusor contraction during voiding. She voids primarily by Valsalva effort. Voids 79 mL with slow (Qmax 8.4 ml/s), interrupted flow, some increased EMG activity, and incomplete bladder emptying ( mL).  -Difficult to evaluate for bladder outlet obstruction given no detrusor contraction during voiding, but would seem most likely that incomplete emptying is secondary to acontractile detrusor.  -Fluoroscopy reveals a mild/moderately trabeculated bladder wall without diverticuli or VUR. The bladder neck is open during filling and voiding.

## 2023-08-22 NOTE — LETTER
8/22/2023       RE: Jenna Jhaveri  [address] [facility] [address]     Dear Colleague,    Thank you for referring your patient, Jenna Jhaveri, to the [facility] UROLOGY CLINIC [facility] at [facility]. Please see a copy of my visit note below.    Pt will meet you in [facility]    Virginia is a 73 year old who is being evaluated via a billable video visit.      How would you like to obtain your AVS? [facility] [place]  If the video visit is dropped, the invitation should be resent by: Text to cell phone: [phone]  Will anyone else be joining your video visit? No        Video-Visit Details    Type of service:  Video Visit   Video Start Time:  10:28am  Video End Time:10:46 AM    Originating Location (pt. Location): Home in [place]    Distant Location (provider location):  On-site  Platform used for Video Visit: [facility]     Urology Clinic    Name: Jenna Jhaveri    MRN: [id number]   YOB: 1950  Accompanied at today's visit by:self              Assessment and Plan:   73 year old female with neurogenic bladder s/p spinal surgery from spinal cord injury, ISD, NEFTALI, FI    - Failed pessary and PFPT did not seem to improve her incontinence. Most bothersome bladder symptoms think secondary to ISD. Discussed possible periurethral bulking.  - Will have patient follow-up with Dr. Shaikh and PVR check.   - Continues to have issues with defecation and fecal incontinence. Referral to colorectal surgery placed.   - Question if may benefit from SNS device for neurogenic bladder and FI issues.   - Continue PT exercises.   - Continue bowel regimen for now.   - After discussing the assessment and plan with patient, patient verbalizes understanding and agrees to the above plan. All questions answered.     Orders Placed This Encounter   Procedures    Adult Colorectal Surgery  Referral     After discussing the assessment and plan with patient, patient verbalized understanding  and agreed to the above plan. All questions answered.     37 minutes were spent today on the date of the encounter in reviewing the EMR, direct patient care, coordination of care and documentation, reviewing VUDS resutls from 4/12/23.     Massiel Berrios PA-C  August 22, 2023    Patient Care Team:  Arsalan Blas MD as PCP - General  Keron Valencia MD as MD (Neurology)  Keron Valencia MD as Assigned Neuroscience Provider  Charlotte Chu PA-C as Physician Assistant (Urology)  Ivonne Shaikh MD as Assigned Surgical Provider  Arlin Gutiérrez MD as MD (Physical Medicine and Rehabilitation)            Chief Complaint:   Neurogenic bladder with hx of spinal cord injury/surgery, ISD and FI          History of Present Illness:   August 22, 2023    HISTORY: Jenna Jhaveri is a 73 year old female is here for follow-up. We have been following her for neurogenic bladder with hx of spinal cord injury and spinal surgery, ISD and FI. Had VUDS on 4/12/23, showing bladder capacity of 350mL, good compliance and no detrusor overactivity. She had minimal detrusor function and has USI starting at low bladder volumes. She then saw Dr. Shaikh on 4/25/23 and was advised to follow-up for pessary placement and referred to PFPT. Seen on 5/15/23 and was fitted with pessary ring #2. Had follow-up on 6/5/23 and did not think the pessary was helping with her bladder symptoms and affecting her bowels, so pessary was removed and advised to start PFPT. Here today for follow-up. States PFPT has helped some with her bladder and bowel symptoms, however continues to have FI without feeling rectal urgency prior to FI. Taking metamucil. Has not seen GI. Is mostly bothered by her FI symptoms and does not leave the house due to concern of having FI. Most bothersome bladder symptoms are incontinence with sitting to standing and when walking. Thinks she is able to void better since starting PT. Hx of  benign essential tremor, VICKI, restrictive lung disease complicated by chronic hypoxemic respiratory failure on home O2, and psoriasis. Patient voices no other concerns at this time.           Past Medical History:   History reviewed. No pertinent past medical history.         Past Surgical History:   History reviewed. No pertinent surgical history.         Social History:     Social History     Tobacco Use    Smoking status: Former     Types: Cigarettes     Quit date:      Years since quittin.6    Smokeless tobacco: Never   Substance Use Topics    Alcohol use: Not on file            Family History:   No family history on file.           Allergies:     Allergies   Allergen Reactions    Potassium Shortness Of Breath and Palpitations     IV Potassium    Statins [Statins] Other (See Comments)     Statin induced myopathy    Penicillin G Swelling    Latex Hives     NO REACTION BUT SCREENING SCORE OF 6    Other Drug Allergy (See Comments) Other (See Comments) and Muscle Pain (Myalgia)     Statin induced myopathy            Medications:     Current Outpatient Medications   Medication Sig    acetaminophen (TYLENOL) 500 MG tablet Take 500 mg by mouth every 6 hours    albuterol (PROAIR HFA/PROVENTIL HFA/VENTOLIN HFA) 108 (90 Base) MCG/ACT inhaler Inhale 2 puffs into the lungs every 6 hours as needed for shortness of breath / dyspnea or wheezing    albuterol (PROVENTIL) (2.5 MG/3ML) 0.083% neb solution USE 1 VIAL IN NEBULIZER EVERY 4 HOURS AS NEEDED FOR SHORTNESS OF BREATH COUGH OR WITH EXERCISE    alendronate (FOSAMAX) 35 MG tablet Take 1 tablet (35 mg) by mouth every 7 days    alendronate (FOSAMAX) 70 MG tablet Take 70 mg by mouth once a week    amLODIPine (NORVASC) 10 MG tablet Take 10 mg by mouth    aspirin (ASA) 81 MG chewable tablet Take 81 mg by mouth    azaTHIOprine (IMURAN) 50 MG tablet TAKE 2 TABLETS BY MOUTH IN THE MORNING AND 1 IN THE EVENING    azaTHIOprine (IMURAN) 50 MG tablet Take 50 mg by mouth 2 times  daily    bisacodyl (DULCOLAX) 10 MG suppository Place 10 mg rectally daily as needed for constipation    Calcium-Vitamin D-Vitamin K 500-200-40 MG-UNT-MCG CHEW 650 Ca - 500 D - 40 K    cholecalciferol 50 MCG (2000 UT) tablet Take 2,000 Units by mouth     cyclobenzaprine (FLEXERIL) 10 MG tablet Take 5 mg by mouth    DULoxetine (CYMBALTA) 20 MG capsule     DULoxetine HCl 40 MG CPEP     fluocinonide (LIDEX) 0.05 % external solution     fluticasone (FLONASE) 50 MCG/ACT nasal spray Spray 1 spray in nostril    fluticasone-salmeterol (ADVAIR) 250-50 MCG/DOSE inhaler     furosemide (LASIX) 20 MG tablet Take 20 mg by mouth daily    gabapentin (NEURONTIN) 300 MG capsule Take 300 mg by mouth 3 times daily    HYDROcodone-acetaminophen (NORCO)  MG per tablet 3 X DAILY    HYDROcodone-acetaminophen (NORCO) 5-325 MG tablet     ibuprofen (ADVIL/MOTRIN) 600 MG tablet     Immune Globulin, Human, 30 GM/300ML SOLN every 28 days    ipratropium (ATROVENT) 0.06 % nasal spray Spray 2 sprays into both nostrils 4 times daily    ipratropium - albuterol 0.5 mg/2.5 mg/3 mL (DUONEB) 0.5-2.5 (3) MG/3ML neb solution Inhale 3 mLs into the lungs     ketoconazole (NIZORAL) 2 % external shampoo     loratadine (CLARITIN) 10 MG tablet Take 10 mg by mouth    melatonin 3 MG CAPS     methocarbamol (ROBAXIN) 500 MG tablet Take 500 mg by mouth    Omega-3 Fatty Acids (FISH OIL OMEGA-3 PO)     omeprazole (PRILOSEC) 20 MG DR capsule Take 1 capsule by mouth once daily    omeprazole (PRILOSEC) 40 MG DR capsule Take 40 mg by mouth    Probiotic Product (PROBIOTIC-10 PO) Take by mouth daily probio - 5    sulfamethoxazole-trimethoprim (BACTRIM DS) 800-160 MG tablet Take 1 tablet by mouth 2 times daily    XARELTO ANTICOAGULANT 20 MG TABS tablet     zinc sulfate (ZINCATE) 220 (50 Zn) MG capsule Take 220 mg by mouth daily    atenolol (TENORMIN) 50 MG tablet Take 50 mg by mouth daily (Patient not taking: Reported on 1/26/2022)    atorvastatin (LIPITOR) 20 MG tablet  "Take 20 mg by mouth daily (Patient not taking: Reported on 1/26/2022)    baclofen (LIORESAL) 10 MG tablet Take 10 mg by mouth 2 times daily around 3 pm and at bedtime. (Patient not taking: Reported on 3/20/2023)    baclofen (LIORESAL) 10 MG tablet 1/2 TAB (5MG) AT 6PM (Patient not taking: Reported on 3/20/2023)    cephALEXin (KEFLEX) 500 MG capsule Take 1 capsule (500 mg) by mouth 2 times daily (Patient not taking: Reported on 8/22/2023)    multivitamin w/minerals (MULTI-VITAMIN) tablet     nitrous oxide/oxygen 50/50 blend 1 application. by inhalation route as directed. 3 lpm continuous    oxyCODONE (ROXICODONE) 5 MG tablet Take 5-10 mg by mouth (Patient not taking: Reported on 8/22/2023)    predniSONE (DELTASONE) 10 MG tablet Take 1 10 mg tablet daily for two weeks. Then take 1 10 mg tablet every other day for two weeks. Then stop. (Patient not taking: Reported on 1/26/2022)    predniSONE (DELTASONE) 20 MG tablet TAKE 1 TABLET BY MOUTH TWICE DAILY WITH MEALS FOR 5 DAYS (Patient not taking: No sig reported)    primidone (MYSOLINE) 50 MG tablet Take 50 mg by mouth    propranolol (INDERAL) 10 MG tablet Take 10 mg by mouth three times daily as needed (tremor).    propranolol ER (INDERAL LA) 60 MG 24 hr capsule Take 60 mg by mouth      No current facility-administered medications for this visit.             Review of Systems:    ROS: 14 point ROS neg other than the symptoms noted above in the HPI.          Physical Exam:   Height 1.619 m (5' 3.75\"), weight 90.3 kg (199 lb).  5' 3.75\", Body mass index is 34.43 kg/m ., 199 lbs 0 oz  Gen appearance: Age-appropriate appearing female in NAD.   HEENT:  EOMI. Normal ROM of neck for age.   Psych:  alert , In no acute distress.  Neuro:  A&Ox3  Resp:  Normal respiratory effort; not in acute respiratory distress.     VUDS 4/12/23  POSTPROCEDURE DIAGNOSES:  -Maximum cystometric capacity ~350 mL with absent filling sensations.  -Good bladder compliance without detrusor overactivity. " There is a very minimal and gradual increase in detrusor pressure at volumes >200 mL. She leaks when PDet reaches ~6 cm H2O around 350 mL with near continuous urinary incontinence after this point.  -Multiple stress leaks at Pabd pressures ranging from 51-71 cm H2O starting at bladder volumes ~200 mL. Low ALPP suggestive for ISD.  -No appreciable detrusor contraction during voiding. She voids primarily by Valsalva effort. Voids 79 mL with slow (Qmax 8.4 ml/s), interrupted flow, some increased EMG activity, and incomplete bladder emptying ( mL).  -Difficult to evaluate for bladder outlet obstruction given no detrusor contraction during voiding, but would seem most likely that incomplete emptying is secondary to acontractile detrusor.  -Fluoroscopy reveals a mild/moderately trabeculated bladder wall without diverticuli or VUR. The bladder neck is open during filling and voiding.

## 2023-08-23 ENCOUNTER — TELEPHONE (OUTPATIENT)
Dept: UROLOGY | Facility: CLINIC | Age: 73
End: 2023-08-23

## 2023-08-23 NOTE — TELEPHONE ENCOUNTER
----- Message from Massiel Berrios PA-C sent at 8/22/2023 11:01 AM CDT -----  MG team:  Please obtain a PVR on patient prior to her follow-up visit with Dr. Shaikh.    [facility] scheduling team:  Please contact patient to schedule next available appointment with Dr. Shaihk; can be virtual. I did place the referral for colorectal team and they should be reaching out to her incase patient asks about this.     chinmay

## 2023-08-24 ENCOUNTER — ALLIED HEALTH/NURSE VISIT (OUTPATIENT)
Dept: NURSING | Facility: CLINIC | Age: 73
End: 2023-08-24
Payer: COMMERCIAL

## 2023-08-24 DIAGNOSIS — N31.9 NEUROGENIC BLADDER: ICD-10-CM

## 2023-08-24 DIAGNOSIS — N39.46 MIXED INCONTINENCE: ICD-10-CM

## 2023-08-24 DIAGNOSIS — R39.89 URINARY PROBLEM: Primary | ICD-10-CM

## 2023-08-24 PROCEDURE — 51798 US URINE CAPACITY MEASURE: CPT | Performed by: PHYSICIAN ASSISTANT

## 2023-08-24 PROCEDURE — 99207 PR NO BILLABLE SERVICE THIS VISIT: CPT | Performed by: UROLOGY

## 2023-08-24 NOTE — NURSING NOTE
Jenna L Emilio comes into clinic today at the request of Massiel Berrios PA-C Ordering Provider for PVR for the diagnosis of urinary problem, neurogenic bladder, and mixed incontinence  .    post void residual 21 ml    This service provided today was under the supervising provider of the day , who was available if needed.    Bailey Stuart LPN

## 2023-08-28 NOTE — TELEPHONE ENCOUNTER
Diagnosis, Referred by & from: Bowel and Bladder Incontinence   Appt date: 11/16/2023   NOTES STATUS DETAILS   OFFICE NOTE from referring provider Internal [facility]:  ([facility]) 11/13/23, 8/22/23 - URO OV with DIMAS Lopez   OFFICE NOTE from other specialist Care Everywhere / Internal [facility]:  8/30/23, 4/25/23 - URO OV with Dr. Shaikh    [facility]:  8/16/23 - PT OV with Jessica Stover PT    [facility]:  8/9/22, 10/27/21 - URO OV with Dr. Donahue  11/17/21 - PCC OV with Lacey Boyce NP   DISCHARGE SUMMARY from hospital Care Everywhere [facility]:  2/14/23 - Admission with Dr. Saavedra  5/17/22 - Admission with Dr. Saavedra    [facility]:  6/29/22 - Admission with Dr. Smith   DISCHARGE REPORT from the ER N/A    OPERATIVE REPORT Internal [facility]:  10/9/23 - OP Note for CYSTOSCOPY WITH PERIURETHRAL BULKING AGENT INJECTION with Dr. Shaikh   MEDICATION LIST Internal    LABS     BIOPSIES/PATHOLOGY RELATED TO DIAGNOSIS Care Everywhere [facility]:  12/19/18 - Colon Biopsy (Case: [id number])   DIAGNOSTIC PROCEDURES     COLONOSCOPY Care Everywhere [facility]:  12/19/18 - Colonoscopuy   IMAGING (DISC & REPORT)      CT Received [facility]:  3/9/22 - CT Abd/Pelvis  10/16/20 - CT Abd/Pelvis   XRAY Received [facility]:  7/4/22 - XR Abdomen     Records Requested  08/28/23    Facility  [facility]  Fax: [phone]   Outcome * 8/28/23 10:54 AM Faxed req to  for images to be pushed to [place] PACs. - Karina    * 9/18/23 8:18 AM Images received from  and attached to the patient in PACs. - Karina

## 2023-08-30 ENCOUNTER — VIRTUAL VISIT (OUTPATIENT)
Dept: UROLOGY | Facility: CLINIC | Age: 73
End: 2023-08-30
Payer: COMMERCIAL

## 2023-08-30 VITALS — HEIGHT: 64 IN | WEIGHT: 199 LBS | BODY MASS INDEX: 33.97 KG/M2

## 2023-08-30 DIAGNOSIS — R32 BOWEL AND BLADDER INCONTINENCE: ICD-10-CM

## 2023-08-30 DIAGNOSIS — N31.9 NEUROGENIC BLADDER: ICD-10-CM

## 2023-08-30 DIAGNOSIS — N36.42 INTRINSIC SPHINCTER DEFICIENCY (ISD): Primary | ICD-10-CM

## 2023-08-30 DIAGNOSIS — R15.9 BOWEL AND BLADDER INCONTINENCE: ICD-10-CM

## 2023-08-30 DIAGNOSIS — Z87.828 HISTORY OF SPINAL CORD INJURY: ICD-10-CM

## 2023-08-30 DIAGNOSIS — R15.9 INCONTINENCE OF FECES, UNSPECIFIED FECAL INCONTINENCE TYPE: ICD-10-CM

## 2023-08-30 PROCEDURE — 99213 OFFICE O/P EST LOW 20 MIN: CPT | Mod: VID | Performed by: UROLOGY

## 2023-08-30 ASSESSMENT — PAIN SCALES - GENERAL: PAINLEVEL: MODERATE PAIN (4)

## 2023-08-30 NOTE — LETTER
8/30/2023       RE: Jenna Jhaveri  [address] [place] [id number]     Dear Colleague,    Thank you for referring your patient, Jenna Jhaveri, to the [facility] UROLOGY CLINIC [facility] at [facility]. Please see a copy of my visit note below.    Pt will meet you in tar[place]    Virginia is a 73 year old who is being evaluated via a billable video visit.      How would you like to obtain your AVS? [facility]  If the video visit is dropped, the invitation should be resent by: Text to cell phone: [phone]  Will anyone else be joining your video visit? No        Video-Visit Details    Type of service:  Video Visit   Video Start Time: 11:11 AM  Video End Time:11:22 AM    Originating Location (pt. Location): Home    Distant Location (provider location):  On-site  Platform used for Video Visit: [employer]     August 30, 2023    Jenna was seen today for other.    Diagnoses and all orders for this visit:    Intrinsic sphincter deficiency (ISD)    Neurogenic bladder    Incontinence of feces, unspecified fecal incontinence type    History of spinal cord injury    Bowel and bladder incontinence       Appointment with colorectal in November for her fecal incontinence    At this time offered her urethral bulking to see if it will help decrease some of her transurethral urine loss, especially with position changes.  Discussed not permanent and may have to be repeated but that it should hopefully help some especially as she is not wanting anything more major at this time    We discussed that the risks to the procedure include but not limited to cardiovascular risks of anesthesia, nerve injury, bleeding, infection, persistent stress incontinence or urge incontinence, need for post-operative gonzalez catheter, need for further procedures including for repeat bulking.  Patient expressed understanding and agreeable to proceed.    16 minutes were spent today on the day of the  "encounter in reviewing the EMR, direct patient care including surgical counseling, coordination of care and documentation    Ivonne Shaikh MD MPH  (she/her/hers)   of Urology  [facility]      Subjective    Here today for follow up, PT has helped but plateaued in progress.  She denies any changes in health since last visit    Ht 1.619 m (5' 3.75\")   Wt 90.3 kg (199 lb)   BMI 34.43 kg/m    GENERAL: healthy, alert and no distress  EYES: Eyes grossly normal to inspection, conjunctivae and sclerae normal  HENT: normal cephalic/atraumatic.  External ears, nose and mouth without ulcers or lesions.  RESP: no audible wheeze, cough, or visible cyanosis.  No visible retractions or increased work of breathing.  Able to speak fully in complete sentences.  NEURO: Cranial nerves grossly intact, mentation intact and speech normal  PSYCH: mentation appears normal, affect normal/bright, judgement and insight intact, normal speech and appearance well-groomed    CC  Patient Care Team:  Arsalan Blas MD as PCP - General  Keron Valencia MD as MD (Neurology)  Keron Valencia MD as Assigned Neuroscience Provider  Charlotte Chu PA-C as Physician Assistant (Urology)  Ivonne Shaikh MD as Assigned Surgical Provider  Arlin Gutiérrez MD as MD (Physical Medicine and Rehabilitation)  Ana Lilia Byrne APRN CNP as Nurse Practitioner (Colon & Rectal)  Massiel Berrios PA-C as Physician Assistant    "

## 2023-08-30 NOTE — PATIENT INSTRUCTIONS
Websites with free information:    [employer] patient website: www.voicesforpfd.org    Total Control Program: www.totalcontrolprogram.com    Surgery scheduler [phone]    Di BROWER [phone]    It was a pleasure meeting with you today.  Thank you for allowing me and my team the privilege of caring for you today.  YOU are the reason we are here, and I truly hope we provided you with the excellent service you deserve.  Please let us know if there is anything else we can do for you so that we can be sure you are leaving completely satisfied with your care experience.

## 2023-08-30 NOTE — PROGRESS NOTES
Pt will meet you in [facility]    Virginia is a 73 year old who is being evaluated via a billable video visit.      How would you like to obtain your AVS? [facility]  If the video visit is dropped, the invitation should be resent by: Text to cell phone: [phone]  Will anyone else be joining your video visit? No        Video-Visit Details    Type of service:  Video Visit   Video Start Time: 11:11 AM  Video End Time:11:22 AM    Originating Location (pt. Location): Home    Distant Location (provider location):  On-site  Platform used for Video Visit: pfwaterworks     August 30, 2023    Jenna was seen today for other.    Diagnoses and all orders for this visit:    Intrinsic sphincter deficiency (ISD)    Neurogenic bladder    Incontinence of feces, unspecified fecal incontinence type    History of spinal cord injury    Bowel and bladder incontinence       Appointment with colorectal in November for her fecal incontinence    At this time offered her urethral bulking to see if it will help decrease some of her transurethral urine loss, especially with position changes.  Discussed not permanent and may have to be repeated but that it should hopefully help some especially as she is not wanting anything more major at this time    We discussed that the risks to the procedure include but not limited to cardiovascular risks of anesthesia, nerve injury, bleeding, infection, persistent stress incontinence or urge incontinence, need for post-operative gonzalez catheter, need for further procedures including for repeat bulking.  Patient expressed understanding and agreeable to proceed.    16 minutes were spent today on the day of the encounter in reviewing the EMR, direct patient care including surgical counseling, coordination of care and documentation    Ivonne Shaikh MD MPH  (she/her/hers)   of Urology  [facility]      Subjective    Here today for follow up, PT has helped but plateaued in progress.  She  "denies any changes in health since last visit    Ht 1.619 m (5' 3.75\")   Wt 90.3 kg (199 lb)   BMI 34.43 kg/m    GENERAL: healthy, alert and no distress  EYES: Eyes grossly normal to inspection, conjunctivae and sclerae normal  HENT: normal cephalic/atraumatic.  External ears, nose and mouth without ulcers or lesions.  RESP: no audible wheeze, cough, or visible cyanosis.  No visible retractions or increased work of breathing.  Able to speak fully in complete sentences.  NEURO: Cranial nerves grossly intact, mentation intact and speech normal  PSYCH: mentation appears normal, affect normal/bright, judgement and insight intact, normal speech and appearance well-groomed    CC  Patient Care Team:  Arsalan Blas MD as PCP - General  Keron Valencia MD as MD (Neurology)  Keron Valencia MD as Assigned Neuroscience Provider  Charlotte Chu PA-C as Physician Assistant (Urology)  Ivonne Shaikh MD as Assigned Surgical Provider  Arlin Gutiérrez MD as MD (Physical Medicine and Rehabilitation)  Ana Lilia Byrne APRN CNP as Nurse Practitioner (Colon & Rectal)  Massiel Berrios PA-C as Physician Assistant          "

## 2023-08-31 ENCOUNTER — MYC MEDICAL ADVICE (OUTPATIENT)
Dept: UROLOGY | Facility: CLINIC | Age: 73
End: 2023-08-31
Payer: COMMERCIAL

## 2023-08-31 ENCOUNTER — TELEPHONE (OUTPATIENT)
Dept: UROLOGY | Facility: CLINIC | Age: 73
End: 2023-08-31
Payer: COMMERCIAL

## 2023-08-31 ENCOUNTER — HOSPITAL ENCOUNTER (OUTPATIENT)
Facility: AMBULATORY SURGERY CENTER | Age: 73
End: 2023-08-31
Attending: UROLOGY
Payer: COMMERCIAL

## 2023-08-31 NOTE — TELEPHONE ENCOUNTER
Patient is scheduled for a surgical procedure with Dr. Shaikh      Spoke with: Patient via phone      Date of Surgery/Procedure: Monday October 09, 2023    Location: [facility] OR      Informed patient they will need an adult : Yes     Pre-op: Yes     PAC EVAL: Monday September 25, 2023    Additional imaging/appointments:     Post-op: Monday November 13, 2023     Additional comments:      Surgery packet: mychart     Patient is aware that surgery time is tentative to change and to expect a call 3-1 business days from Pre Admission Nursing for instructions and arrival time

## 2023-08-31 NOTE — TELEPHONE ENCOUNTER
[facility] Call Center    Phone Message    May a detailed message be left on voicemail: yes     Reason for Call: Other: Pt wondering if she needs to keep her appointment w/ Renate on 9/25. Please call pt to further discuss, thanks!     Action Taken: Message routed to:  Adult Clinics: Urology p [id number]    Travel Screening: Not Applicable

## 2023-08-31 NOTE — TELEPHONE ENCOUNTER
Received message from Massiel Berrios PA-C who reviewed patient's chart. Okay to cancel follow up visit with Massiel Berrios PA-C.     Attempted to reach patient by phone, but per family member, patient is unavailable at this time. Informed family member that patient can return call to clinic or check my chart. My chart message sent.    Flor Ge RN, BSN

## 2023-09-01 NOTE — TELEPHONE ENCOUNTER
FUTURE VISIT INFORMATION      SURGERY INFORMATION:  Date: 10/9/23  Location: [facility] or  Surgeon:  Ivonne Shaikh MD   Anesthesia Type:  choice  Procedure: CYSTOSCOPY, WITH PERIURETHRAL BULKING AGENT INJECTION   Consult: virtual visit 23    RECORDS REQUESTED FROM:       Primary Care Provider: Arsalan Blas MBBS - [facility]    Most recent EKG+ Tracin/15/23- [facility]    Most recent PFT's: 23- [facility]

## 2023-09-05 DIAGNOSIS — G72.49 AUTOIMMUNE NECROTIZING MYOPATHY: ICD-10-CM

## 2023-09-05 RX ORDER — AZATHIOPRINE 50 MG/1
TABLET ORAL
Qty: 60 TABLET | Refills: 1 | Status: SHIPPED | OUTPATIENT
Start: 2023-09-05 | End: 2023-12-26

## 2023-09-15 DIAGNOSIS — G72.49 AUTOIMMUNE NECROTIZING MYOPATHY: Primary | ICD-10-CM

## 2023-09-18 ENCOUNTER — OFFICE VISIT (OUTPATIENT)
Dept: NEUROLOGY | Facility: CLINIC | Age: 73
End: 2023-09-18
Payer: COMMERCIAL

## 2023-09-18 ENCOUNTER — LAB (OUTPATIENT)
Dept: LAB | Facility: CLINIC | Age: 73
End: 2023-09-18
Payer: COMMERCIAL

## 2023-09-18 VITALS
HEIGHT: 66 IN | BODY MASS INDEX: 32.61 KG/M2 | HEART RATE: 78 BPM | SYSTOLIC BLOOD PRESSURE: 132 MMHG | WEIGHT: 202.9 LBS | OXYGEN SATURATION: 93 % | DIASTOLIC BLOOD PRESSURE: 81 MMHG

## 2023-09-18 DIAGNOSIS — G72.49 AUTOIMMUNE NECROTIZING MYOPATHY: ICD-10-CM

## 2023-09-18 DIAGNOSIS — Z79.60 LONG-TERM USE OF IMMUNOSUPPRESSANT MEDICATION: Primary | ICD-10-CM

## 2023-09-18 DIAGNOSIS — Z79.60 LONG-TERM USE OF IMMUNOSUPPRESSANT MEDICATION: ICD-10-CM

## 2023-09-18 DIAGNOSIS — M54.16 LUMBAR POLYRADICULOPATHY: ICD-10-CM

## 2023-09-18 LAB
ALBUMIN SERPL BCG-MCNC: 4.1 G/DL (ref 3.5–5.2)
ALP SERPL-CCNC: 95 U/L (ref 35–104)
ALT SERPL W P-5'-P-CCNC: 9 U/L (ref 0–50)
ANION GAP SERPL CALCULATED.3IONS-SCNC: 11 MMOL/L (ref 7–15)
AST SERPL W P-5'-P-CCNC: 14 U/L (ref 0–45)
BASOPHILS # BLD AUTO: 0 10E3/UL (ref 0–0.2)
BASOPHILS NFR BLD AUTO: 1 %
BILIRUB SERPL-MCNC: 0.3 MG/DL
BUN SERPL-MCNC: 17.4 MG/DL (ref 8–23)
CALCIUM SERPL-MCNC: 9.2 MG/DL (ref 8.8–10.2)
CHLORIDE SERPL-SCNC: 101 MMOL/L (ref 98–107)
CK SERPL-CCNC: 33 U/L (ref 26–192)
CREAT SERPL-MCNC: 0.56 MG/DL (ref 0.51–0.95)
DEPRECATED HCO3 PLAS-SCNC: 27 MMOL/L (ref 22–29)
EGFRCR SERPLBLD CKD-EPI 2021: >90 ML/MIN/1.73M2
EOSINOPHIL # BLD AUTO: 0.2 10E3/UL (ref 0–0.7)
EOSINOPHIL NFR BLD AUTO: 2 %
ERYTHROCYTE [DISTWIDTH] IN BLOOD BY AUTOMATED COUNT: 14.2 % (ref 10–15)
GLUCOSE SERPL-MCNC: 132 MG/DL (ref 70–99)
HCT VFR BLD AUTO: 42.5 % (ref 35–47)
HGB BLD-MCNC: 14 G/DL (ref 11.7–15.7)
IMM GRANULOCYTES # BLD: 0 10E3/UL
IMM GRANULOCYTES NFR BLD: 0 %
LYMPHOCYTES # BLD AUTO: 1.1 10E3/UL (ref 0.8–5.3)
LYMPHOCYTES NFR BLD AUTO: 14 %
MCH RBC QN AUTO: 31.6 PG (ref 26.5–33)
MCHC RBC AUTO-ENTMCNC: 32.9 G/DL (ref 31.5–36.5)
MCV RBC AUTO: 96 FL (ref 78–100)
MONOCYTES # BLD AUTO: 0.7 10E3/UL (ref 0–1.3)
MONOCYTES NFR BLD AUTO: 9 %
NEUTROPHILS # BLD AUTO: 5.8 10E3/UL (ref 1.6–8.3)
NEUTROPHILS NFR BLD AUTO: 74 %
NRBC # BLD AUTO: 0 10E3/UL
NRBC BLD AUTO-RTO: 0 /100
PLATELET # BLD AUTO: 252 10E3/UL (ref 150–450)
POTASSIUM SERPL-SCNC: 4.4 MMOL/L (ref 3.4–5.3)
PROT SERPL-MCNC: 7.3 G/DL (ref 6.4–8.3)
RBC # BLD AUTO: 4.43 10E6/UL (ref 3.8–5.2)
SODIUM SERPL-SCNC: 139 MMOL/L (ref 136–145)
WBC # BLD AUTO: 7.8 10E3/UL (ref 4–11)

## 2023-09-18 PROCEDURE — 36415 COLL VENOUS BLD VENIPUNCTURE: CPT | Performed by: PATHOLOGY

## 2023-09-18 PROCEDURE — 85025 COMPLETE CBC W/AUTO DIFF WBC: CPT | Performed by: PATHOLOGY

## 2023-09-18 PROCEDURE — 82550 ASSAY OF CK (CPK): CPT | Performed by: PATHOLOGY

## 2023-09-18 PROCEDURE — 99214 OFFICE O/P EST MOD 30 MIN: CPT | Mod: GC | Performed by: PSYCHIATRY & NEUROLOGY

## 2023-09-18 PROCEDURE — 80053 COMPREHEN METABOLIC PANEL: CPT | Performed by: PATHOLOGY

## 2023-09-18 ASSESSMENT — PAIN SCALES - GENERAL: PAINLEVEL: MODERATE PAIN (5)

## 2023-09-18 NOTE — PATIENT INSTRUCTIONS
You can wean Gabapentin down to 300mg x1 pill three times daily (from 600mg three times daily)  After 2 weeks, can go to 300mg twice daily  After 2 weeks, can go to 300mg once daily  If your pain is still controlled, can stop after this  For Azathioprine you can starting taking 50mg daily in October, and stay on this dose until follow up  Follow up in 6 months, call sooner with questions or change/worsening weakness

## 2023-09-18 NOTE — PROGRESS NOTES
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      She was treated in the past with IVIG and high-dose corticosteroids.  Since 2021 she is on azathioprine, and now she is taking monotherapy at 50 mg twice daily.  CBC was done on 2023 showed expected macrocytosis with , which is now resolved to 96 today. Last CK 29 in March, recheck CK today is 33.     In addition to the myopathy, she has a complex history of lumbar spine injury.  She had a previous fusion many years ago for scoliosis, which was complicated by pseudarthrosis and flatback syndrome. There was possibly bone fracture at the site of fusion at L2, which required revision surgery done last year in 2022. The surgery was an elective  OSTEOTOMY PEDICLE SUBTRACTION L4, INSTRUMENTATION REPLACEMENT T12-S1,PELVIC FIXATION, POSTERIOR SPINE FUSION L3-L5, INSTRUMENTATION AND BONE GRAFT, DURAL REPAIR AND DRAIN PLACEMENT. During the procedure a big dural tear was discovered with exposure of the arachnoid and cauda equina roots. Unfortunately, after the revision she was found with bilateral right more than left leg pain and weakness.  She still has numbness in the entire right leg from the buttock to the thigh and calf, and pain that feels like a toothache.  She is taking gabapentin and Cymbalta for the pain, with incomplete relief.  She recently started a supplement for nerve pain that I was not aware of, which was recommended by her daughter.  I asked the patient to investigate the name  of the supplement and let me know.  In February 2023 she had to undergo repeat surgery, for reinstrumentation at right L5-S1.      As above, she is now taking Azathioprine 50mg BID without any changes to weakness or muscle pain.  She continues to have full functionality of her arms without any problems with ADLs in that regard.  She previously noticed a little left arm weakness, but this is resolved.  She is now walking more with a walker, going about 1 block every other day, which is continued to improve.  She is able to do most ADLs in the home independently such as cooking, cleaning, and other chores around the home.  She is able to use the commode and shower independently, but she says she usually has somebody in the bathroom or near it in case she has a fall.  She denies falls in the last 6 months.  She continues to use the walker at all times, even within the home.  She still endorses that bending is quite difficult, especially picking something up the ground, but she has of the utensils to help with this.  She says this is limited due to stiffness from the fusion but does not have related pain or weakness of the hips when bending.  Notable on last exam was a asymmetric elbow extension and wrist flexion weakness, possibly due to underlying radiculopathy, but recent EMG was unremarkable and does not show radiculopathy.  Otherwise she has gotten better since that time and slowly improving functionally.      ROS: A 10-point ROS was performed as per HPI.    PMH:  No past medical history on file.  No past surgical history on file.    Allergies:  Allergies   Allergen Reactions    Potassium Shortness Of Breath and Palpitations     IV Potassium    Statins [Statins] Other (See Comments)     Statin induced myopathy    Penicillin G Swelling    Latex Hives     NO REACTION BUT SCREENING SCORE OF 6    Other Drug Allergy (See Comments) Muscle Pain (Myalgia) and Other (See Comments)     Statin induced myopathy        Medications:      Current Outpatient Medications:     acetaminophen (TYLENOL) 500 MG tablet, Take 500 mg by mouth every 6 hours, Disp: , Rfl:     albuterol (PROAIR HFA/PROVENTIL HFA/VENTOLIN HFA) 108 (90 Base) MCG/ACT inhaler, Inhale 2 puffs into the lungs every 6 hours as needed for shortness of breath / dyspnea or wheezing, Disp: , Rfl:     albuterol (PROVENTIL) (2.5 MG/3ML) 0.083% neb solution, USE 1 VIAL IN NEBULIZER EVERY 4 HOURS AS NEEDED FOR SHORTNESS OF BREATH COUGH OR WITH EXERCISE, Disp: , Rfl:     alendronate (FOSAMAX) 35 MG tablet, Take 1 tablet (35 mg) by mouth every 7 days, Disp: 12 tablet, Rfl: 1    alendronate (FOSAMAX) 70 MG tablet, Take 70 mg by mouth once a week, Disp: , Rfl:     amLODIPine (NORVASC) 10 MG tablet, Take 10 mg by mouth, Disp: , Rfl:     aspirin (ASA) 81 MG chewable tablet, Take 81 mg by mouth, Disp: , Rfl:     atenolol (TENORMIN) 50 MG tablet, Take 50 mg by mouth daily (Patient not taking: Reported on 8/30/2023), Disp: , Rfl:     atorvastatin (LIPITOR) 20 MG tablet, Take 20 mg by mouth daily, Disp: , Rfl:     azaTHIOprine (IMURAN) 50 MG tablet, Take 1 tablet (50 mg) twice daily-per dosage reduction instructions from last appointment., Disp: 60 tablet, Rfl: 1    azaTHIOprine (IMURAN) 50 MG tablet, Take 50 mg by mouth 2 times daily, Disp: , Rfl:     baclofen (LIORESAL) 10 MG tablet, Take 10 mg by mouth 2 times daily around 3 pm and at bedtime. (Patient not taking: Reported on 3/20/2023), Disp: , Rfl:     baclofen (LIORESAL) 10 MG tablet, 1/2 TAB (5MG) AT 6PM (Patient not taking: Reported on 3/20/2023), Disp: , Rfl:     bisacodyl (DULCOLAX) 10 MG suppository, Place 10 mg rectally daily as needed for constipation, Disp: , Rfl:     Calcium-Vitamin D-Vitamin K 500-200-40 MG-UNT-MCG CHEW, 650 Ca - 500 D - 40 K, Disp: , Rfl:     cephALEXin (KEFLEX) 500 MG capsule, Take 1 capsule (500 mg) by mouth 2 times daily (Patient not taking: Reported on 8/22/2023), Disp: 14 capsule, Rfl: 0     cholecalciferol 50 MCG (2000 UT) tablet, Take 2,000 Units by mouth , Disp: , Rfl:     cyclobenzaprine (FLEXERIL) 10 MG tablet, Take 5 mg by mouth, Disp: , Rfl:     DULoxetine (CYMBALTA) 20 MG capsule, , Disp: , Rfl:     DULoxetine HCl 40 MG CPEP, , Disp: , Rfl:     fluocinonide (LIDEX) 0.05 % external solution, , Disp: , Rfl:     fluticasone (FLONASE) 50 MCG/ACT nasal spray, Spray 1 spray in nostril, Disp: , Rfl:     fluticasone-salmeterol (ADVAIR) 250-50 MCG/DOSE inhaler, , Disp: , Rfl:     furosemide (LASIX) 20 MG tablet, Take 20 mg by mouth daily, Disp: , Rfl:     gabapentin (NEURONTIN) 300 MG capsule, Take 300 mg by mouth 3 times daily, Disp: , Rfl:     HYDROcodone-acetaminophen (NORCO)  MG per tablet, 3 X DAILY, Disp: , Rfl:     HYDROcodone-acetaminophen (NORCO) 5-325 MG tablet, , Disp: , Rfl:     ibuprofen (ADVIL/MOTRIN) 600 MG tablet, , Disp: , Rfl:     Immune Globulin, Human, 30 GM/300ML SOLN, every 28 days, Disp: , Rfl:     ipratropium (ATROVENT) 0.06 % nasal spray, Spray 2 sprays into both nostrils 4 times daily, Disp: , Rfl:     ipratropium - albuterol 0.5 mg/2.5 mg/3 mL (DUONEB) 0.5-2.5 (3) MG/3ML neb solution, Inhale 3 mLs into the lungs , Disp: , Rfl:     ketoconazole (NIZORAL) 2 % external shampoo, , Disp: , Rfl:     loratadine (CLARITIN) 10 MG tablet, Take 10 mg by mouth, Disp: , Rfl:     melatonin 3 MG CAPS, , Disp: , Rfl:     methocarbamol (ROBAXIN) 500 MG tablet, Take 500 mg by mouth, Disp: , Rfl:     multivitamin w/minerals (MULTI-VITAMIN) tablet, , Disp: , Rfl:     nitrous oxide/oxygen 50/50 blend, 1 application. by inhalation route as directed. 3 lpm continuous, Disp: , Rfl:     Omega-3 Fatty Acids (FISH OIL OMEGA-3 PO), , Disp: , Rfl:     omeprazole (PRILOSEC) 20 MG DR capsule, Take 1 capsule by mouth once daily, Disp: 90 capsule, Rfl: 0    omeprazole (PRILOSEC) 40 MG DR capsule, Take 40 mg by mouth, Disp: , Rfl:     oxyCODONE (ROXICODONE) 5 MG tablet, Take 5-10 mg by mouth (Patient not  "taking: Reported on 2023), Disp: , Rfl:     predniSONE (DELTASONE) 10 MG tablet, Take 1 10 mg tablet daily for two weeks. Then take 1 10 mg tablet every other day for two weeks. Then stop. (Patient not taking: Reported on 2022), Disp: 30 tablet, Rfl: 1    predniSONE (DELTASONE) 20 MG tablet, TAKE 1 TABLET BY MOUTH TWICE DAILY WITH MEALS FOR 5 DAYS (Patient not taking: No sig reported), Disp: , Rfl:     primidone (MYSOLINE) 50 MG tablet, Take 50 mg by mouth, Disp: , Rfl:     Probiotic Product (PROBIOTIC-10 PO), Take by mouth daily probio - 5, Disp: , Rfl:     propranolol (INDERAL) 10 MG tablet, Take 10 mg by mouth three times daily as needed (tremor)., Disp: , Rfl:     propranolol ER (INDERAL LA) 60 MG 24 hr capsule, Take 60 mg by mouth , Disp: , Rfl:     sulfamethoxazole-trimethoprim (BACTRIM DS) 800-160 MG tablet, Take 1 tablet by mouth 2 times daily, Disp: 14 tablet, Rfl: 0    XARELTO ANTICOAGULANT 20 MG TABS tablet, , Disp: , Rfl:     zinc sulfate (ZINCATE) 220 (50 Zn) MG capsule, Take 220 mg by mouth daily, Disp: , Rfl:     Social History:  Social History     Tobacco Use    Smoking status: Former     Types: Cigarettes     Quit date:      Years since quittin.7    Smokeless tobacco: Never   Substance Use Topics    Alcohol use: Not on file       Family History:    No family history on file.      Physical Examination  Vitals: /81 (BP Location: Right arm, Patient Position: Right side, Cuff Size: Adult Regular)   Pulse 78   Ht 1.664 m (5' 5.5\")   Wt 92 kg (202 lb 14.4 oz)   SpO2 93%   BMI 33.25 kg/m      General: Alert, interactive; NAD, cooperative  HEENT: Normocephalic, atraumatic, nares patent, no oral lesions  Pulm: No increased work of breathing  Extremities: Warm and well perfused, peripheral pulses present, R>L leg edema  Musculoskeletal: No deformities of feet, hands, or limbs  Skin: Not jaundiced, no rash, no ecchymoses  Psych: Normal mood and affect    Neurologic:   Mental " status: Attentive, interactive; Recalls remote and recent history with accuracy  Speech/Language: Fluent speech without paraphasic errors; No dysarthria  Cranial nerves: Visual fields intact to confrontation, Eyes conjugate on neutral gaze, Pupils 4mm and brisk, EOMI w/ normal and smooth pursuit, face expression symmetric, facial sensation intact and symmetric, hearing intact to conversation, shoulder shrug strong, palate rise symmetric, tongue/uvula midline.    Motor:   Bulk: Appropriate, no atrophy or hypertrophy appreciated  Tone: Appropriate, occasional paratonia  Spontaneous movements: No myoclonus, asterixis, or fasciculations  Power: *All strength assessments based on MRC grading  Pronator drift absent.    Right Left   Arm abduction 5 5   Elbow Flex 5 5   Elbow Extension 5 4+   Wrist Extension 5 5   Wrist Flexion 5 5   FDI  4+ 4+   APB 5 5   Fingertip Flexion 5 5     5 5   Hip Flexion 4 5   Knee Flexion 4 5   Knee Extension 4+ 5   Dorsiflexion  4+ 5   Plantarflexion 5 5     Reflexes:    Right Left   Triceps 2 2   Biceps 2 2   BR 2 2   Mosley NR NR   Patella 1 1   Achilles  0 0   Plantar down down   No crossed adductors or spread. No clonus    Sensory:   General sensation: General touch intact throughout  Vibratory (timed): Left MM 8s, Right MM 3s  Proprioception: Normal in big toes bilaterally    Coordination:   FNF intact bilaterally without dysmetria  Finger tapping with normal amplitude and frequency    Gait/Station:   Able to rise unassisted from a seated position.   Gait deferred due to fall risk      INVESTIGATIONS:  Labs: MCV 96, CK 33  Electrophysiology:   EMG/NCS (4/12/2023):  Interpretation:  This is a normal study. In particular, there is no electrophysiologic evidence of cervical radiculopathy, polyneuropathy, or currently active myopathy in the left upper limb.      IMPRESSION/PLAN:  Jenna Jhaveri is a 73 year old woman with HMGCR autoimmune necrotizing myopathy currently doing very well  functionally even while weaning azathioprine to low-dose.  She is willing to wean down to 50 mg daily and we will do so in the following months and keep her at that dose until follow-up.  We advised her on a titration schedule for gabapentin due to resolving lower extremity pain as well.    Plan:  You can wean Gabapentin down to 300mg x1 pill three times daily (from 600mg three times daily)  After 2 weeks, can go to 300mg twice daily  After 2 weeks, can go to 300mg once daily  If your pain is still controlled, can stop after this  For Azathioprine you can starting taking 50mg daily in October, and stay on this dose until follow up  Follow up in 6 months, call sooner with questions or change/worsening weakness      Patient seen and discussed with attending Dr. Erik Velarde MD  Neuromuscular Medicine Fellow, PGY-5  [facility]    ATTENDING ADDENDUM: Patient seen and examined with Neuromuscular Fellow Dr Velarde at the [facility] on 9/18/2023. Agree with his impression and plan. Billing MDM level 4 (moderate) based on 1) Problems assessed: Two stable chronic conditions (autoimmune necrotizing myopathy, and lumbosacral radiculopathies) and 2) Risk: prescription drug management, see above.     Keron Valencia MD, FAAN

## 2023-09-18 NOTE — LETTER
2023       RE: Jenna Jhaveri  [address] [facility] [address]     Dear Colleague,    Thank you for referring your patient, Jenna Jhaevri, to the [facility] [place] at [facility]. Please see a copy of my visit note below.        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      She was treated in the past with IVIG and high-dose corticosteroids.  Since 2021 she is on azathioprine, and now she is taking monotherapy at 50 mg twice daily.  CBC was done on 2023 showed expected macrocytosis with , which is now resolved to 96 today. Last CK 29 in March, recheck CK today is 33.     In addition to the myopathy, she has a complex history of lumbar spine injury.  She had a previous fusion many years ago for scoliosis, which was complicated by pseudarthrosis and flatback syndrome. There was possibly bone fracture at the site of fusion at L2, which required revision surgery done last year in 2022. The surgery was an elective  OSTEOTOMY PEDICLE SUBTRACTION L4, INSTRUMENTATION REPLACEMENT T12-S1,PELVIC FIXATION, POSTERIOR SPINE FUSION L3-L5, INSTRUMENTATION AND BONE GRAFT, DURAL REPAIR AND DRAIN PLACEMENT. During the procedure a big dural tear was discovered with exposure of the arachnoid and cauda equina roots. Unfortunately, after the revision she was found with bilateral right more than left leg pain and weakness.  She still has numbness in the entire right  leg from the buttock to the thigh and calf, and pain that feels like a toothache.  She is taking gabapentin and Cymbalta for the pain, with incomplete relief.  She recently started a supplement for nerve pain that I was not aware of, which was recommended by her daughter.  I asked the patient to investigate the name of the supplement and let me know.  In February 2023 she had to undergo repeat surgery, for reinstrumentation at right L5-S1.      As above, she is now taking Azathioprine 50mg BID without any changes to weakness or muscle pain.  She continues to have full functionality of her arms without any problems with ADLs in that regard.  She previously noticed a little left arm weakness, but this is resolved.  She is now walking more with a walker, going about 1 block every other day, which is continued to improve.  She is able to do most ADLs in the home independently such as cooking, cleaning, and other chores around the home.  She is able to use the commode and shower independently, but she says she usually has somebody in the bathroom or near it in case she has a fall.  She denies falls in the last 6 months.  She continues to use the walker at all times, even within the home.  She still endorses that bending is quite difficult, especially picking something up the ground, but she has of the utensils to help with this.  She says this is limited due to stiffness from the fusion but does not have related pain or weakness of the hips when bending.  Notable on last exam was a asymmetric elbow extension and wrist flexion weakness, possibly due to underlying radiculopathy, but recent EMG was unremarkable and does not show radiculopathy.  Otherwise she has gotten better since that time and slowly improving functionally.      ROS: A 10-point ROS was performed as per HPI.    PMH:  No past medical history on file.  No past surgical history on file.    Allergies:  Allergies   Allergen Reactions    Potassium Shortness Of  Breath and Palpitations     IV Potassium    Statins [Statins] Other (See Comments)     Statin induced myopathy    Penicillin G Swelling    Latex Hives     NO REACTION BUT SCREENING SCORE OF 6    Other Drug Allergy (See Comments) Muscle Pain (Myalgia) and Other (See Comments)     Statin induced myopathy       Medications:      Current Outpatient Medications:     acetaminophen (TYLENOL) 500 MG tablet, Take 500 mg by mouth every 6 hours, Disp: , Rfl:     albuterol (PROAIR HFA/PROVENTIL HFA/VENTOLIN HFA) 108 (90 Base) MCG/ACT inhaler, Inhale 2 puffs into the lungs every 6 hours as needed for shortness of breath / dyspnea or wheezing, Disp: , Rfl:     albuterol (PROVENTIL) (2.5 MG/3ML) 0.083% neb solution, USE 1 VIAL IN NEBULIZER EVERY 4 HOURS AS NEEDED FOR SHORTNESS OF BREATH COUGH OR WITH EXERCISE, Disp: , Rfl:     alendronate (FOSAMAX) 35 MG tablet, Take 1 tablet (35 mg) by mouth every 7 days, Disp: 12 tablet, Rfl: 1    alendronate (FOSAMAX) 70 MG tablet, Take 70 mg by mouth once a week, Disp: , Rfl:     amLODIPine (NORVASC) 10 MG tablet, Take 10 mg by mouth, Disp: , Rfl:     aspirin (ASA) 81 MG chewable tablet, Take 81 mg by mouth, Disp: , Rfl:     atenolol (TENORMIN) 50 MG tablet, Take 50 mg by mouth daily (Patient not taking: Reported on 8/30/2023), Disp: , Rfl:     atorvastatin (LIPITOR) 20 MG tablet, Take 20 mg by mouth daily, Disp: , Rfl:     azaTHIOprine (IMURAN) 50 MG tablet, Take 1 tablet (50 mg) twice daily-per dosage reduction instructions from last appointment., Disp: 60 tablet, Rfl: 1    azaTHIOprine (IMURAN) 50 MG tablet, Take 50 mg by mouth 2 times daily, Disp: , Rfl:     baclofen (LIORESAL) 10 MG tablet, Take 10 mg by mouth 2 times daily around 3 pm and at bedtime. (Patient not taking: Reported on 3/20/2023), Disp: , Rfl:     baclofen (LIORESAL) 10 MG tablet, 1/2 TAB (5MG) AT 6PM (Patient not taking: Reported on 3/20/2023), Disp: , Rfl:     bisacodyl (DULCOLAX) 10 MG suppository, Place 10 mg rectally  daily as needed for constipation, Disp: , Rfl:     Calcium-Vitamin D-Vitamin K 500-200-40 MG-UNT-MCG CHEW, 650 Ca - 500 D - 40 K, Disp: , Rfl:     cephALEXin (KEFLEX) 500 MG capsule, Take 1 capsule (500 mg) by mouth 2 times daily (Patient not taking: Reported on 8/22/2023), Disp: 14 capsule, Rfl: 0    cholecalciferol 50 MCG (2000 UT) tablet, Take 2,000 Units by mouth , Disp: , Rfl:     cyclobenzaprine (FLEXERIL) 10 MG tablet, Take 5 mg by mouth, Disp: , Rfl:     DULoxetine (CYMBALTA) 20 MG capsule, , Disp: , Rfl:     DULoxetine HCl 40 MG CPEP, , Disp: , Rfl:     fluocinonide (LIDEX) 0.05 % external solution, , Disp: , Rfl:     fluticasone (FLONASE) 50 MCG/ACT nasal spray, Spray 1 spray in nostril, Disp: , Rfl:     fluticasone-salmeterol (ADVAIR) 250-50 MCG/DOSE inhaler, , Disp: , Rfl:     furosemide (LASIX) 20 MG tablet, Take 20 mg by mouth daily, Disp: , Rfl:     gabapentin (NEURONTIN) 300 MG capsule, Take 300 mg by mouth 3 times daily, Disp: , Rfl:     HYDROcodone-acetaminophen (NORCO)  MG per tablet, 3 X DAILY, Disp: , Rfl:     HYDROcodone-acetaminophen (NORCO) 5-325 MG tablet, , Disp: , Rfl:     ibuprofen (ADVIL/MOTRIN) 600 MG tablet, , Disp: , Rfl:     Immune Globulin, Human, 30 GM/300ML SOLN, every 28 days, Disp: , Rfl:     ipratropium (ATROVENT) 0.06 % nasal spray, Spray 2 sprays into both nostrils 4 times daily, Disp: , Rfl:     ipratropium - albuterol 0.5 mg/2.5 mg/3 mL (DUONEB) 0.5-2.5 (3) MG/3ML neb solution, Inhale 3 mLs into the lungs , Disp: , Rfl:     ketoconazole (NIZORAL) 2 % external shampoo, , Disp: , Rfl:     loratadine (CLARITIN) 10 MG tablet, Take 10 mg by mouth, Disp: , Rfl:     melatonin 3 MG CAPS, , Disp: , Rfl:     methocarbamol (ROBAXIN) 500 MG tablet, Take 500 mg by mouth, Disp: , Rfl:     multivitamin w/minerals (MULTI-VITAMIN) tablet, , Disp: , Rfl:     nitrous oxide/oxygen 50/50 blend, 1 application. by inhalation route as directed. 3 lpm continuous, Disp: , Rfl:     Omega-3  "Fatty Acids (FISH OIL OMEGA-3 PO), , Disp: , Rfl:     omeprazole (PRILOSEC) 20 MG DR capsule, Take 1 capsule by mouth once daily, Disp: 90 capsule, Rfl: 0    omeprazole (PRILOSEC) 40 MG DR capsule, Take 40 mg by mouth, Disp: , Rfl:     oxyCODONE (ROXICODONE) 5 MG tablet, Take 5-10 mg by mouth (Patient not taking: Reported on 2023), Disp: , Rfl:     predniSONE (DELTASONE) 10 MG tablet, Take 1 10 mg tablet daily for two weeks. Then take 1 10 mg tablet every other day for two weeks. Then stop. (Patient not taking: Reported on 2022), Disp: 30 tablet, Rfl: 1    predniSONE (DELTASONE) 20 MG tablet, TAKE 1 TABLET BY MOUTH TWICE DAILY WITH MEALS FOR 5 DAYS (Patient not taking: No sig reported), Disp: , Rfl:     primidone (MYSOLINE) 50 MG tablet, Take 50 mg by mouth, Disp: , Rfl:     Probiotic Product (PROBIOTIC-10 PO), Take by mouth daily probio - 5, Disp: , Rfl:     propranolol (INDERAL) 10 MG tablet, Take 10 mg by mouth three times daily as needed (tremor)., Disp: , Rfl:     propranolol ER (INDERAL LA) 60 MG 24 hr capsule, Take 60 mg by mouth , Disp: , Rfl:     sulfamethoxazole-trimethoprim (BACTRIM DS) 800-160 MG tablet, Take 1 tablet by mouth 2 times daily, Disp: 14 tablet, Rfl: 0    XARELTO ANTICOAGULANT 20 MG TABS tablet, , Disp: , Rfl:     zinc sulfate (ZINCATE) 220 (50 Zn) MG capsule, Take 220 mg by mouth daily, Disp: , Rfl:     Social History:  Social History     Tobacco Use    Smoking status: Former     Types: Cigarettes     Quit date:      Years since quittin.7    Smokeless tobacco: Never   Substance Use Topics    Alcohol use: Not on file       Family History:    No family history on file.      Physical Examination  Vitals: /81 (BP Location: Right arm, Patient Position: Right side, Cuff Size: Adult Regular)   Pulse 78   Ht 1.664 m (5' 5.5\")   Wt 92 kg (202 lb 14.4 oz)   SpO2 93%   BMI 33.25 kg/m      General: Alert, interactive; NAD, cooperative  HEENT: Normocephalic, atraumatic, " nares patent, no oral lesions  Pulm: No increased work of breathing  Extremities: Warm and well perfused, peripheral pulses present, R>L leg edema  Musculoskeletal: No deformities of feet, hands, or limbs  Skin: Not jaundiced, no rash, no ecchymoses  Psych: Normal mood and affect    Neurologic:   Mental status: Attentive, interactive; Recalls remote and recent history with accuracy  Speech/Language: Fluent speech without paraphasic errors; No dysarthria  Cranial nerves: Visual fields intact to confrontation, Eyes conjugate on neutral gaze, Pupils 4mm and brisk, EOMI w/ normal and smooth pursuit, face expression symmetric, facial sensation intact and symmetric, hearing intact to conversation, shoulder shrug strong, palate rise symmetric, tongue/uvula midline.    Motor:   Bulk: Appropriate, no atrophy or hypertrophy appreciated  Tone: Appropriate, occasional paratonia  Spontaneous movements: No myoclonus, asterixis, or fasciculations  Power: *All strength assessments based on MRC grading  Pronator drift absent.    Right Left   Arm abduction 5 5   Elbow Flex 5 5   Elbow Extension 5 4+   Wrist Extension 5 5   Wrist Flexion 5 5   FDI  4+ 4+   APB 5 5   Fingertip Flexion 5 5     5 5   Hip Flexion 4 5   Knee Flexion 4 5   Knee Extension 4+ 5   Dorsiflexion  4+ 5   Plantarflexion 5 5     Reflexes:    Right Left   Triceps 2 2   Biceps 2 2   BR 2 2   Mosley NR NR   Patella 1 1   Achilles  0 0   Plantar down down   No crossed adductors or spread. No clonus    Sensory:   General sensation: General touch intact throughout  Vibratory (timed): Left MM 8s, Right MM 3s  Proprioception: Normal in big toes bilaterally    Coordination:   FNF intact bilaterally without dysmetria  Finger tapping with normal amplitude and frequency    Gait/Station:   Able to rise unassisted from a seated position.   Gait deferred due to fall risk      INVESTIGATIONS:  Labs: MCV 96, CK 33  Electrophysiology:   EMG/NCS  (4/12/2023):  Interpretation:  This is a normal study. In particular, there is no electrophysiologic evidence of cervical radiculopathy, polyneuropathy, or currently active myopathy in the left upper limb.      IMPRESSION/PLAN:  Jenna Jhaveri is a 73 year old woman with HMGCR autoimmune necrotizing myopathy currently doing very well functionally even while weaning azathioprine to low-dose.  She is willing to wean down to 50 mg daily and we will do so in the following months and keep her at that dose until follow-up.  We advised her on a titration schedule for gabapentin due to resolving lower extremity pain as well.    Plan:  You can wean Gabapentin down to 300mg x1 pill three times daily (from 600mg three times daily)  After 2 weeks, can go to 300mg twice daily  After 2 weeks, can go to 300mg once daily  If your pain is still controlled, can stop after this  For Azathioprine you can starting taking 50mg daily in October, and stay on this dose until follow up  Follow up in 6 months, call sooner with questions or change/worsening weakness      Patient seen and discussed with attending Dr. Erik Velarde MD  Neuromuscular Medicine Fellow, PGY-5  [facility]    ATTENDING ADDENDUM: Patient seen and examined with Neuromuscular Fellow Dr Velarde at the [facility] on 9/18/2023. Agree with his impression and plan. Billing MDM level 4 (moderate) based on 1) Problems assessed: Two stable chronic conditions (autoimmune necrotizing myopathy, and lumbosacral radiculopathies) and 2) Risk: prescription drug management, see above.         Again, thank you for allowing me to participate in the care of your patient.      Sincerely,    Keron Valencia MD

## 2023-09-18 NOTE — NURSING NOTE
Chief Complaint   Patient presents with    RECHECK       Vitals were taken and medications were reconciled.      Grant Pulido, Technician  1:25 PM  September 18, 2023

## 2023-09-22 RX ORDER — NAPROXEN SODIUM 220 MG
220 TABLET ORAL EVERY MORNING
COMMUNITY

## 2023-09-22 RX ORDER — PROPRANOLOL HYDROCHLORIDE 80 MG/1
80 CAPSULE, EXTENDED RELEASE ORAL EVERY MORNING
COMMUNITY

## 2023-09-22 RX ORDER — DULOXETIN HYDROCHLORIDE 60 MG/1
60 CAPSULE, DELAYED RELEASE ORAL DAILY
COMMUNITY

## 2023-09-22 NOTE — PROGRESS NOTES
Preoperative Assessment Center Medication History Note  Medication history completed on September 22, 2023 by this writer prior to patient's PAC appointment. See Epic admission navigator for prior to admission medications. Operating room staff will still need to confirm medications and last dose information on day of surgery.     Medication history interview sources  Patient and CareEverywhere/SureScripts via phone    Changes made to PTA medication list  Added: aleve  Deleted: wrong dose omperazole, wrong dose duloxetine x2, atenolol, alendronate, atorvastatin, baclofen x2, bisacodyl, cephalexin (completed, flexeril, norco x2, IVIG, ketoconazole, melatonin, oxycodone, prednisone x2, bactrim, probiotic, Xarelto. Fish oil.   Changed: duloxetine, propranolol dose/sig, primidone dose/sig, omeprazole sig, amlodipine dose/sig, loratadine sig, azathoprine, aspirin sig, advair dose/sig, robaxin, ipratropium, duoneb, ibuprofen, viactiv, flonase, lidex, lasix dose/sig,     Allergies reviewed with patient and updates made in EHR: yes    -- No recent (within 30 days) course of antibiotics  -- No recent (within 30 days) course of systemic steroids  -- Reports not being on blood thinning medications except aspirin 81 mg daily.    -- Declines being on any other prescription or over-the-counter medications    Prior to Admission medications    Medication Sig Last Dose Taking? Auth Provider Long Term End Date   acetaminophen (TYLENOL) 500 MG tablet Take 500-1,000 mg by mouth every 8 hours as needed Taking Yes Reported, Patient     albuterol (PROAIR HFA/PROVENTIL HFA/VENTOLIN HFA) 108 (90 Base) MCG/ACT inhaler Inhale 2 puffs into the lungs every 6 hours as needed for shortness of breath / dyspnea or wheezing Taking Yes Reported, Patient Yes    alendronate (FOSAMAX) 70 MG tablet Take 70 mg by mouth once a week Sundays Taking Yes Reported, Patient Yes    amLODIPine (NORVASC) 10 MG tablet Take 10 mg by mouth daily Taking Yes Reported,  Patient Yes    aspirin (ASA) 81 MG chewable tablet Take 81 mg by mouth every evening Taking Yes Reported, Patient     azaTHIOprine (IMURAN) 50 MG tablet Take 1 tablet (50 mg) twice daily-per dosage reduction instructions from last appointment.  Patient taking differently: Take 50 mg by mouth every evening Taking Yes Keron Valencia MD Yes    Calcium-Vitamin D-Vitamin K 500-200-40 MG-UNT-MCG CHEW Take 1 chew tab by mouth daily 650 Ca - 500 D - 40 K Taking Yes Reported, Patient     cholecalciferol 50 MCG (2000 UT) tablet Take 2,000 Units by mouth daily Taking Yes Reported, Patient     DULoxetine (CYMBALTA) 60 MG capsule Take 60 mg by mouth daily Taking Yes Unknown, Entered By History Yes    fluocinonide (LIDEX) 0.05 % external solution Apply topically daily as needed (scalp) Taking Yes Reported, Patient     fluticasone (FLONASE) 50 MCG/ACT nasal spray Spray 1 spray in nostril daily as needed for rhinitis Taking Yes Reported, Patient     fluticasone-salmeterol (ADVAIR) 250-50 MCG/DOSE inhaler Inhale 1 puff into the lungs 2 times daily Taking Yes Reported, Patient Yes    furosemide (LASIX) 20 MG tablet Take 20 mg by mouth daily as needed (ankle swelling) Taking Yes Reported, Patient Yes    gabapentin (NEURONTIN) 300 MG capsule Take 300 mg by mouth 3 times daily Taking Yes Reported, Patient Yes    ibuprofen (ADVIL/MOTRIN) 600 MG tablet Take 600 mg by mouth every 8 hours as needed Taking Yes Reported, Patient     ipratropium (ATROVENT) 0.06 % nasal spray Spray 2 sprays into both nostrils 3 times daily as needed for rhinitis Taking Yes Reported, Patient     ipratropium - albuterol 0.5 mg/2.5 mg/3 mL (DUONEB) 0.5-2.5 (3) MG/3ML neb solution Inhale 3 mLs into the lungs every 4 hours as needed for shortness of breath Taking Yes Reported, Patient Yes    loratadine (CLARITIN) 10 MG tablet Take 10 mg by mouth daily Taking Yes Reported, Patient     methocarbamol (ROBAXIN) 500 MG tablet Take 500 mg by mouth 3 times  daily as needed for muscle spasms Taking Yes Reported, Patient     multivitamin w/minerals (MULTI-VITAMIN) tablet Take 1 tablet by mouth daily Uses here and there Taking Yes Reported, Patient     naproxen sodium (ANAPROX) 220 MG tablet Take 220 mg by mouth every morning Taking Yes Unknown, Entered By History     omeprazole (PRILOSEC) 40 MG DR capsule Take 40 mg by mouth daily Taking Yes Reported, Patient     primidone (MYSOLINE) 50 MG tablet Take 50 mg by mouth every morning Taking Yes Reported, Patient Yes 9/22/23   propranolol (INDERAL) 10 MG tablet Take 10 mg by mouth three times daily as needed (tremor). Taking Yes Reported, Patient Yes 9/22/23   propranolol ER (INDERAL LA) 80 MG 24 hr capsule Take 80 mg by mouth every morning Taking Yes Unknown, Entered By History Yes    zinc sulfate (ZINCATE) 220 (50 Zn) MG capsule Take 220 mg by mouth daily Taking Yes Reported, Patient     nitrous oxide/oxygen 50/50 blend 1 application. by inhalation route as directed. 3 lpm continuous   Reported, Patient          Medication History Completed By: Sidney Harrell [facility] 9/22/2023 2:46 PM

## 2023-09-25 ENCOUNTER — VIRTUAL VISIT (OUTPATIENT)
Dept: SURGERY | Facility: CLINIC | Age: 73
End: 2023-09-25
Payer: COMMERCIAL

## 2023-09-25 ENCOUNTER — ANESTHESIA EVENT (OUTPATIENT)
Dept: SURGERY | Facility: CLINIC | Age: 73
End: 2023-09-25
Payer: COMMERCIAL

## 2023-09-25 ENCOUNTER — PRE VISIT (OUTPATIENT)
Dept: SURGERY | Facility: CLINIC | Age: 73
End: 2023-09-25

## 2023-09-25 DIAGNOSIS — Z01.818 PRE-OP EVALUATION: Primary | ICD-10-CM

## 2023-09-25 PROCEDURE — 99203 OFFICE O/P NEW LOW 30 MIN: CPT | Mod: 95 | Performed by: PHYSICIAN ASSISTANT

## 2023-09-25 ASSESSMENT — ENCOUNTER SYMPTOMS: SEIZURES: 0

## 2023-09-25 ASSESSMENT — LIFESTYLE VARIABLES: TOBACCO_USE: 0

## 2023-09-25 NOTE — PATIENT INSTRUCTIONS
Preparing for Your Surgery      Name:  Jenna Jhaveri   MRN:  [id number]   :  1950   Today's Date:  2023       Arriving for surgery:  Surgery date:  10/9/23  Arrival time:  12:00PM    Please come to:     Please come to:      [facility] [address]  [address] [place]  [address]      The [facility] Patient /Visitor Ramp is located at [address]. Patients and visitors who self-park will receive the reduced hospital parking rate. If the Patient /Visitor Ramp is full, please follow the signs to the  parking located at the main hospital entrance.     parking is available ( 24 hours/ 7 days a week)    Discounted parking pass options are available for patients and visitors. They can be purchased at the [employer] desk at the main hospital entrance.    -    Stop at the security desk and they will direct surgery patients to the 3rd floor Surgery Waiting Room. [phone]     -  If you are in need of directions, wheelchair or escort please stop at the Information/security desk in the lobby.       What can I eat or drink?  -  You may eat and drink normally up to 8 hours prior to arrival time. (Until 10/9/23, 4:00AM)  -  You may have clear liquids until 2 hours prior to arrival time. (Until 10/9/23, 10:00AM)    Examples of clear liquids:  Water  Clear broth  Juices (apple, white grape, white cranberry  and cider) without pulp  Noncarbonated, powder based beverages  (lemonade and Sammy-Aid)  Sodas (Sprite, 7-Up, ginger ale and seltzer)  Coffee or tea (without milk or cream)  Gatorade    -  No Alcohol or cannabis products for at least 24 hours before surgery.     Which medicines can I take?    Hold Aspirin for 7 days before surgery.   Hold Multivitamins for 7 days before surgery.  Hold Supplements for 7 days before surgery.  Hold Ibuprofen (Advil, Motrin) for 1 day(s) before surgery--unless otherwise directed by  surgeon.  Hold Naproxen (Aleve) for 4 days before surgery.    -  DO NOT take these medications the day of surgery:    Calcium plus D    Vitamin D    Furosemide(Lasix)    Loratadine(Claritin)    Zinc    -  PLEASE TAKE these medications the day of surgery:    Acetaminophen(Tylenol) as needed    Albuterol Inhaler as needed and bring the day of surgery    Duloxetine(Cymbalta)    Flonase as needed    Advair    Gabapentin(Neurontin)    Atrovent Nasal spray as needed    Duoneb as needed    Methocarbamol(Robaxin) as needed    Omeprazole(Prilosec)    Primidone(Mysoline)    Propranolol ER(Inderal LA)    How do I prepare myself?  - Please take 2 showers (one the night prior to surgery and one the morning of surgery) using Scrubcare or Hibiclens soap.    Use this soap only from the neck to your toes.     Leave the soap on your skin for one minute--then rinse thoroughly.      You may use your own shampoo and conditioner. No other hair products.   - Please remove all jewelry and body piercings.  - No lotions, deodorants or fragrance.  - No makeup or fingernail polish.   - Bring your ID and insurance card.    -If you have a Deep Brain Stimulator, Spinal Cord Stimulator, or any Neuro Stimulator device---you must bring the remote control to the hospital.      ALL PATIENTS GOING HOME THE SAME DAY OF SURGERY ARE REQUIRED TO HAVE A RESPONSIBLE ADULT TO DRIVE AND BE IN ATTENDANCE WITH THEM FOR 24 HOURS FOLLOWING SURGERY.    Covid testing policy as of 12/06/2022  Your surgeon will notify and schedule you for a COVID test if one is needed before surgery--please direct any questions or COVID symptoms to your surgeon      Questions or Concerns:    - For any questions regarding the day of surgery or your hospital stay, please contact the Pre Admission Nursing Office at [phone].       - If you have health changes between today and your surgery, please call your surgeon.       - For questions after surgery, please call your surgeons  office.           Current Visitor Guidelines    You may have 2 visitors in the pre op area.    Visiting hours: 8 a.m. to 8:30 p.m.    You may have four visitors during your inpatient hospital stay.    Patients confirmed or suspected to have symptoms of COVID 19 or flu:     No visitors allowed for adult patients.   Children (under age 18) can have 1 named visitor.     People who are sick or showing symptoms of COVID 19 or flu:    Are not allowed to visit patients--we can only make exceptions in special situations.       Please follow these guidelines for your visit:          Please maintain social distance          Masking is optional--however at times you may be asked to wear a mask for the safety of yourself and others     Clean your hands with alcohol hand . Do this when you arrive at and leave the building and patient room,    And again after you touch your mask or anything in the room.     Go directly to and from the room you are visiting.     Stay in the patient s room during your visit. Limit going to other places in the hospital as much as possible     Leave bags and jackets at home or in the car.     For everyone s health, please don t come and go during your visit. That includes for smoking   during your visit.

## 2023-09-25 NOTE — PROGRESS NOTES
Virginia is a 73 year old who is being evaluated via a billable video visit.      How would you like to obtain your AVS? Mariya Nolen   Virginia is a 73 year old, presenting for the following health issues:  Pre-Op Exam    HPI           Review of Systems       Physical Exam     MAURICIO Santoyo LPN

## 2023-09-25 NOTE — H&P
Pre-Operative H & P     CC:  Preoperative exam to assess for increased cardiopulmonary risk while undergoing surgery and anesthesia.    Date of Encounter: 9/25/2023  Primary Care Physician:  Arsalan Blas     Reason for visit:   Encounter Diagnosis   Name Primary?    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 Jhaveri follows with Dr. Shaikh for intrinsic sphincter deficiency. She is now scheduled for the above procedure.     History is obtained from the patient and chart review    Hx of abnormal bleeding or anti-platelet use: ASA 81 mg    Menstrual history: No LMP recorded. Patient is postmenopausal.     Past Medical History  No past medical history on file.    Past Surgical History  Past Surgical History:   Procedure Laterality Date    ORTHOPEDIC SURGERY      multiple back procedures, knee surgery, carpal tunnel       Prior to Admission Medications  Current Outpatient Medications   Medication Sig Dispense Refill    acetaminophen (TYLENOL) 500 MG tablet Take 500-1,000 mg by mouth every 8 hours as needed      albuterol (PROAIR HFA/PROVENTIL HFA/VENTOLIN HFA) 108 (90 Base) MCG/ACT inhaler Inhale 2 puffs into  the lungs every 6 hours as needed for shortness of breath / dyspnea or wheezing      alendronate (FOSAMAX) 70 MG tablet Take 70 mg by mouth once a week Sundays      amLODIPine (NORVASC) 10 MG tablet Take 10 mg by mouth daily      aspirin (ASA) 81 MG chewable tablet Take 81 mg by mouth every evening      azaTHIOprine (IMURAN) 50 MG tablet Take 1 tablet (50 mg) twice daily-per dosage reduction instructions from last appointment. (Patient taking differently: Take 50 mg by mouth every evening) 60 tablet 1    Calcium-Vitamin D-Vitamin K 500-200-40 MG-UNT-MCG CHEW Take 1 chew tab by mouth daily 650 Ca - 500 D - 40 K      cholecalciferol 50 MCG (2000 UT) tablet Take 2,000 Units by mouth daily      DULoxetine (CYMBALTA) 60 MG capsule Take 60 mg by mouth daily      fluocinonide (LIDEX) 0.05 % external solution Apply topically daily as needed (scalp)      fluticasone (FLONASE) 50 MCG/ACT nasal spray Spray 1 spray in nostril daily as needed for rhinitis      fluticasone-salmeterol (ADVAIR) 250-50 MCG/DOSE inhaler Inhale 1 puff into the lungs 2 times daily      furosemide (LASIX) 20 MG tablet Take 20 mg by mouth daily as needed (ankle swelling)      gabapentin (NEURONTIN) 300 MG capsule Take 300 mg by mouth 3 times daily      ibuprofen (ADVIL/MOTRIN) 600 MG tablet Take 600 mg by mouth every 8 hours as needed      ipratropium (ATROVENT) 0.06 % nasal spray Spray 2 sprays into both nostrils 3 times daily as needed for rhinitis      ipratropium - albuterol 0.5 mg/2.5 mg/3 mL (DUONEB) 0.5-2.5 (3) MG/3ML neb solution Inhale 3 mLs into the lungs every 4 hours as needed for shortness of breath      loratadine (CLARITIN) 10 MG tablet Take 10 mg by mouth daily      methocarbamol (ROBAXIN) 500 MG tablet Take 500 mg by mouth 3 times daily as needed for muscle spasms      multivitamin w/minerals (MULTI-VITAMIN) tablet Take 1 tablet by mouth daily Uses here and there      naproxen sodium (ANAPROX) 220 MG tablet Take 220 mg by mouth every  morning      omeprazole (PRILOSEC) 40 MG DR capsule Take 40 mg by mouth daily      primidone (MYSOLINE) 50 MG tablet Take 50 mg by mouth every morning      propranolol (INDERAL) 10 MG tablet Take 10 mg by mouth three times daily as needed (tremor).      propranolol ER (INDERAL LA) 80 MG 24 hr capsule Take 80 mg by mouth every morning      zinc sulfate (ZINCATE) 220 (50 Zn) MG capsule Take 220 mg by mouth daily      nitrous oxide/oxygen 50/50 blend 1 application. by inhalation route as directed. 3 lpm continuous         Allergies  Allergies   Allergen Reactions    Potassium Shortness Of Breath and Palpitations     IV Potassium    Statins [Statins] Other (See Comments)     Statin induced myopathy    Penicillin G Swelling    Latex Hives     NO REACTION BUT SCREENING SCORE OF 6    Other Drug Allergy (See Comments) Muscle Pain (Myalgia) and Other (See Comments)     Statin induced myopathy       Social History  Social History     Socioeconomic History    Marital status:      Spouse name: Not on file    Number of children: Not on file    Years of education: Not on file    Highest education level: Not on file   Occupational History    Not on file   Tobacco Use    Smoking status: Former     Types: Cigarettes     Quit date:      Years since quittin.7     Passive exposure: Past    Smokeless tobacco: Never   Substance and Sexual Activity    Alcohol use: Never    Drug use: Never    Sexual activity: Not on file   Other Topics Concern    Not on file   Social History Narrative    Not on file     Social Determinants of Health     Financial Resource Strain: Not on file   Food Insecurity: Not on file   Transportation Needs: Not on file   Physical Activity: Not on file   Stress: Not on file   Social Connections: Not on file   Interpersonal Safety: Not on file   Housing Stability: Not on file       Family History  Family History   Problem Relation Age of Onset    Anesthesia Reaction No family hx of     Deep Vein  Thrombosis (DVT) No family hx of        Review of Systems  The complete review of systems is negative other than noted in the HPI or here.   Anesthesia Evaluation   Pt has had prior anesthetic.     No history of anesthetic complications       ROS/MED HX  ENT/Pulmonary: Comment: restrictive lung disease  Using 3 L O2 with activity     (+) sleep apnea, uses CPAP,                                  (-) tobacco use   Neurologic: Comment: Scoliosis    (-) no seizures and no CVA   Cardiovascular:     (+) Dyslipidemia hypertension- -   -  - -   Taking blood thinners                              Previous cardiac testing   Echo: Date: 7/2022 Results:  Summary:    * The left ventricle is normal in size. There is mild concentric LVH with   normal systolic function EF of 55 to 60% with no regional wall motion   abnormalities..    * The left ventricular diastolic function is normal for age.     * The right ventricle is enlarged.     * Normal right ventricular systolic function.     * The left atrium is normal in size.     * The right atrium is normal in size.     * The aortic valve is trileaflet with no stenosis or regurgitation..     * The mitral valve leaflets are structurally normal.     * Doppler interrogation of the TV is inadequate to assess pulmonary   artery   systolic pressure.     * The IVC is normal in size (< 2.1 cm), > 50% respiratory variance, RA   pressure normal at 3 mmHg.     * There is no pericardial effusion visualized.     Stress Test:  Date: Results:    ECG Reviewed:  Date: 2/15/23 Results:  NSR, possible old infarct   Cath:  Date: Results:      METS/Exercise Tolerance: 3 - Able to walk 1-2 blocks without stopping Comment: Walking daily about 1 block. Using walker. Has right leg foot drop. Denies CP.    Hematologic:     (+) History of blood clots,    pt is not anticoagulated,  history of blood transfusion, no previous transfusion reaction,        Musculoskeletal: Comment: Autoimmune necrotizing myopathy        GI/Hepatic:     (+) GERD, Asymptomatic on medication,                  Renal/Genitourinary: Comment: Intrinsic sphincter deficiency       Endo:     (+)               Obesity (BMI 33),    (-) chronic steroid usage   Psychiatric/Substance Use:  - neg psychiatric ROS     Infectious Disease:  - neg infectious disease ROS     Malignancy:       Other:            Virtual visit -  No vitals were obtained    Physical Exam  Constitutional: Awake, alert, cooperative, no apparent distress, and appears stated age.  HENT: Normocephalic  Respiratory: non labored breathing   Neurologic: Awake, alert, oriented to name, place and time.   Neuropsychiatric: Calm, cooperative. Normal affect.      Prior Labs/Diagnostic Studies   All labs and imaging personally reviewed     EKG/ stress test - if available please see in ROS above   No results found.      Latest Ref Rng & Units 8/26/2021     1:28 PM   PFT   FVC L 1.42    FEV1 L 1.10    FVC% % 52    FEV1% % 51          The patient's records and results personally reviewed by this provider.     Outside records reviewed from: Care Everywhere      Assessment    Jenna Jhaveri is a 73 year old female seen as a PAC referral for risk assessment and optimization for anesthesia.    Plan/Recommendations  Pt will be optimized for the proposed procedure.  See below for details on the assessment, risk, and preoperative recommendations    NEUROLOGY  - No history of TIA, CVA or seizure  -Scoliosis s/p multiple previous surgeries   -h/o SCI with neurogenic bladder  -Post Op delirium risk factors:  No risk identified    ENT  - No current airway concerns.  Will need to be reassessed day of surgery.  Mallampati: Unable to assess  TM: Unable to assess    CARDIAC  -hypertension using norvasc, propranolol, lasix  -denies cardiac history or symptoms  -Previous cardiac testing above including echo with EF 55-60%, no wall motion abnormalities 7/2022  - METS (Metabolic Equivalents)  Patient CANNOT perform 4  "METS exercise without symptoms            Total Score: 1    Functional Capacity: Unable to complete 4 METS      RCRI-Very low risk: Class 1 0.4% complication rate            Total Score: 0        PULMONARY  - Obstructive Sleep Apnea  VICKI with home CPAP.  Patient will be instructed to bring their home CPAP device to the hospital with them.  - RLD. Using inhalers. At baseline. Uses 3 L O2 with activity.  Reports only improvement in symptoms since last general 2/2023.   - Tobacco History    History   Smoking Status    Former    Types: Cigarettes    Quit date: 2004   Smokeless Tobacco    Never       GI  - GERD  Controlled on medications: Proton Pump Inhibitor  PONV High Risk  Total Score: 3           1 AN PONV: Pt is Female    1 AN PONV: Patient is not a current smoker    1 AN PONV: Intended Post Op Opioids        /RENAL  - Baseline Creatinine WNL  -neurogenic bladder. She will reach out to urology to discuss coming in for updated UA prior to surgery    ENDOCRINE    - BMI: Estimated body mass index is 33.25 kg/m  as calculated from the following:    Height as of 9/18/23: 1.664 m (5' 5.5\").    Weight as of 9/18/23: 92 kg (202 lb 14.4 oz).  Obesity (BMI >30)  - No history of Diabetes Mellitus    HEME  VTE Low Risk 0.5%            Total Score: 4    VTE: Greater than 59 yrs old    VTE: Pt history of VTE      - Platelet disfunction second to Aspirin (Brionna, many others)  -h/o PE 5/2022. 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 Past IVIG. Now weaning gabapentin and imuran. Follows with neurology. Last CK WNL.     ACCESS  Reports difficult h/o IV access and needle phobia     Different anesthesia methods/types have been discussed with the patient, but they are aware that the final plan will be decided by the assigned anesthesia provider on the date of service.    The patient is optimized for their procedure. AVS with information on surgery time/arrival time, meds and NPO status given " by nursing staff. No further diagnostic testing indicated.    Please refer to the physical examination documented by the anesthesiologist in the anesthesia record on the day of surgery.    Video-Visit Details    Type of service:  Video Visit    Provider received verbal consent for a Video Visit from the patient? Yes   Video Start Time:  0827  Video End Time: 0841    Originating Location (pt. Location): Home    Distant Location (provider location):  Off-site  Mode of Communication:  Video Conference via [employer]  On the day of service:     Prep time: 13 minutes  Visit time: 14 minutes  Documentation time: 10 minutes  ------------------------------------------  Total time: 37 minutes      Ondina Michel PA-C  Preoperative Assessment Center  [facility]  Phone: [phone]  Fax: [phone]

## 2023-09-26 DIAGNOSIS — N31.9 NEUROGENIC BLADDER: Primary | ICD-10-CM

## 2023-09-26 DIAGNOSIS — N36.42 INTRINSIC SPHINCTER DEFICIENCY (ISD): ICD-10-CM

## 2023-10-05 DIAGNOSIS — N39.0 UTI (URINARY TRACT INFECTION): Primary | ICD-10-CM

## 2023-10-05 RX ORDER — SULFAMETHOXAZOLE/TRIMETHOPRIM 800-160 MG
1 TABLET ORAL 2 TIMES DAILY
Qty: 10 TABLET | Refills: 0 | Status: SHIPPED | OUTPATIENT
Start: 2023-10-05 | End: 2023-10-10

## 2023-10-08 ASSESSMENT — ENCOUNTER SYMPTOMS: SEIZURES: 0

## 2023-10-08 NOTE — ANESTHESIA PREPROCEDURE EVALUATION
Anesthesia Pre-Procedure Evaluation    Patient: Jenna Jhaveri   MRN: [id number] : 1950        Procedure : Procedure(s):  CYSTOSCOPY, WITH PERIURETHRAL BULKING AGENT INJECTION  **Latex Allergy**          No past medical history on file.   Past Surgical History:   Procedure Laterality Date    ORTHOPEDIC SURGERY      multiple back procedures, knee surgery, carpal tunnel      Allergies   Allergen Reactions    Potassium Shortness Of Breath and Palpitations     IV Potassium    Statins [Statins] Other (See Comments)     Statin induced myopathy    Penicillin G Swelling    Latex Hives     NO REACTION BUT SCREENING SCORE OF 6    Other Drug Allergy (See Comments) Muscle Pain (Myalgia) and Other (See Comments)     Statin induced myopathy      Social History     Tobacco Use    Smoking status: Former     Types: Cigarettes     Quit date:      Years since quittin.7     Passive exposure: Past    Smokeless tobacco: Never   Substance Use Topics    Alcohol use: Never      Wt Readings from Last 1 Encounters:   23 92 kg (202 lb 14.4 oz)        Anesthesia Evaluation   Pt has had prior anesthetic.     No history of anesthetic complications       ROS/MED HX  ENT/Pulmonary: Comment: -- Restrictive lung disease secondary to idiopathic scoliosis, with worsening since  due to possibly unprovoked PE  -- Using 3 L O2 with activity -- stable    (+) sleep apnea, uses CPAP,              tobacco use, Past use,                      Neurologic: Comment:   Idiopathic Scoliosis, s/p several spinal fusions, one of those complicated with lumbar level spinal injury due to hardware.   -- Current deficit: areas of analgesia to right hip>> left.   -- Urinary incontinence  -- Walks with a walker, but muscle strength to LE reserved/   Denies prior cervical spine fusions. No Neck pain or any known problems with cervical spine. No radicular symptoms.    (-) no seizures and no CVA   Cardiovascular:     (+) Dyslipidemia hypertension- -    -  - -   Taking blood thinners                              Previous cardiac testing   Echo: Date: 7/2022 Results:    * The left ventricle is normal in size. There is mild concentric LVH with   normal systolic function EF of 55 to 60% with no regional wall motion abnormalities..   * The left ventricular diastolic function is normal for age.   * The right ventricle is enlarged.   * Normal right ventricular systolic function.   * The left atrium is normal in size.   * The right atrium is normal in size.   * The aortic valve is trileaflet with no stenosis or regurgitation..   * The mitral valve leaflets are structurally normal.   * Doppler interrogation of the TV is inadequate to assess pulmonary  artery systolic pressure.   * The IVC is normal in size (< 2.1 cm), > 50% respiratory variance, RA pressure normal at 3 mmHg.   * There is no pericardial effusion visualized.     Stress Test:  Date: Results:    ECG Reviewed:  Date: 2/15/23 Results:  NSR, possible old infarct   Cath:  Date: Results:      METS/Exercise Tolerance: 3 - Able to walk 1-2 blocks without stopping Comment: Walking daily about 1 block. Using walker. Has right leg foot drop. Denies CP.    Hematologic: Comments:   -- Unprovoked PE in 2021, managed with Xarelto for 3 months.   -- Uncomplicated DVT LE in 2021 (while admitted to the hospital after her last spine Sx) -- received oral anticoagulation for 3 months and then ordered to stop it.   -- Patient is NOT anticoagulated now.     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 Since Aug 2021 on Azathioprine. Stable.  # Congenital scoliosis and Flat back syndrome, complicated by some spinal cord injury and restrictive lung disease.   -- now s/p several spinal  fusions, last one 2022 - healed well. No reported acute compressions in most recent images. Spine stable. No radiculopathy.      GI/Hepatic:     (+) GERD, Asymptomatic on medication,                  Renal/Genitourinary: Comment: Intrinsic sphincter deficiency & Urinary incontinence      Endo:     (+)               Obesity (BMI 33),    (-) chronic steroid usage   Psychiatric/Substance Use:  - neg psychiatric ROS     Infectious Disease:  - neg infectious disease ROS     Malignancy:  - neg malignancy ROS     Other:            Physical Exam    Airway        Mallampati: I   TM distance: > 3 FB   Neck ROM: full   Mouth opening: > 3 cm    Respiratory Devices and Support         Dental     Comment: Upper dentures still in place - to be removed prior to OR time.   Lower partial dentures already removed at time of AM eval.     (+) Multiple crowns, permanant bridges      Cardiovascular   cardiovascular exam normal          Pulmonary           breath sounds clear to auscultation           OUTSIDE LABS:  CBC:   Lab Results   Component Value Date    WBC 7.8 09/18/2023    WBC 5.0 03/20/2023    HGB 14.0 09/18/2023    HGB 12.6 03/20/2023    HCT 42.5 09/18/2023    HCT 37.2 03/20/2023     09/18/2023     03/20/2023     BMP:   Lab Results   Component Value Date     09/18/2023    POTASSIUM 4.4 09/18/2023    CHLORIDE 101 09/18/2023    CO2 27 09/18/2023    BUN 17.4 09/18/2023    CR 0.56 09/18/2023     (H) 09/18/2023     COAGS: No results found for: PTT, INR, FIBR  POC: No results found for: BGM, HCG, HCGS  HEPATIC:   Lab Results   Component Value Date    ALBUMIN 4.1 09/18/2023    PROTTOTAL 7.3 09/18/2023    ALT 9 09/18/2023    AST 14 09/18/2023    ALKPHOS 95 09/18/2023    BILITOTAL 0.3 09/18/2023     OTHER:   Lab Results   Component Value Date    DHARMESH 9.2 09/18/2023       Anesthesia Plan    ASA Status:  3    NPO Status:  NPO Appropriate    Anesthesia Type: General.     - Airway: LMA      Maintenance: Balanced.         Consents    Anesthesia Plan(s) and associated risks, benefits, and realistic alternatives discussed. Questions answered and patient/representative(s) expressed understanding.     - Discussed:     - Discussed with:  Patient      - Extended Intubation/Ventilatory Support Discussed: No.      - Patient is DNR/DNI Status: No     Use of blood products discussed: No .     Postoperative Care    Pain management: Multi-modal analgesia, Oral pain medications.   PONV prophylaxis: Ondansetron (or other 5HT-3), Dexamethasone or Solumedrol     Comments:                Edison An MD

## 2023-10-09 ENCOUNTER — ANESTHESIA (OUTPATIENT)
Dept: SURGERY | Facility: CLINIC | Age: 73
End: 2023-10-09
Payer: COMMERCIAL

## 2023-10-09 ENCOUNTER — HOSPITAL ENCOUNTER (OUTPATIENT)
Facility: CLINIC | Age: 73
Discharge: HOME | End: 2023-10-09
Attending: UROLOGY | Admitting: UROLOGY
Payer: COMMERCIAL

## 2023-10-09 VITALS
RESPIRATION RATE: 23 BRPM | BODY MASS INDEX: 35.51 KG/M2 | DIASTOLIC BLOOD PRESSURE: 79 MMHG | SYSTOLIC BLOOD PRESSURE: 124 MMHG | WEIGHT: 200.4 LBS | OXYGEN SATURATION: 94 % | TEMPERATURE: 97.9 F | HEIGHT: 63 IN | HEART RATE: 95 BPM

## 2023-10-09 PROCEDURE — 999N000141 HC STATISTIC PRE-PROCEDURE NURSING ASSESSMENT: Performed by: UROLOGY

## 2023-10-09 PROCEDURE — 258N000003 HC RX IP 258 OP 636

## 2023-10-09 PROCEDURE — 250N000009 HC RX 250

## 2023-10-09 PROCEDURE — 250N000011 HC RX IP 250 OP 636: Mod: JZ

## 2023-10-09 PROCEDURE — 710N000010 HC RECOVERY PHASE 1, LEVEL 2, PER MIN: Performed by: UROLOGY

## 2023-10-09 PROCEDURE — L8606 SYNTHETIC IMPLNT URINARY 1ML: HCPCS | Performed by: UROLOGY

## 2023-10-09 PROCEDURE — 370N000017 HC ANESTHESIA TECHNICAL FEE, PER MIN: Performed by: UROLOGY

## 2023-10-09 PROCEDURE — 250N000025 HC SEVOFLURANE, PER MIN: Performed by: UROLOGY

## 2023-10-09 PROCEDURE — 360N000075 HC SURGERY LEVEL 2, PER MIN: Performed by: UROLOGY

## 2023-10-09 PROCEDURE — 250N000011 HC RX IP 250 OP 636: Performed by: UROLOGY

## 2023-10-09 PROCEDURE — 272N000001 HC OR GENERAL SUPPLY STERILE: Performed by: UROLOGY

## 2023-10-09 PROCEDURE — 258N000003 HC RX IP 258 OP 636: Performed by: UROLOGY

## 2023-10-09 PROCEDURE — 710N000012 HC RECOVERY PHASE 2, PER MINUTE: Performed by: UROLOGY

## 2023-10-09 PROCEDURE — 51715 ENDOSCOPIC INJECTION/IMPLANT: CPT | Mod: GC | Performed by: UROLOGY

## 2023-10-09 PROCEDURE — 250N000011 HC RX IP 250 OP 636: Mod: JZ | Performed by: UROLOGY

## 2023-10-09 DEVICE — BULKAMID URETHRAL BULKING SYSTEM CHARGE BY SYRINGE 50050: Type: IMPLANTABLE DEVICE | Site: URETHRA | Status: FUNCTIONAL

## 2023-10-09 RX ORDER — LABETALOL HYDROCHLORIDE 5 MG/ML
10 INJECTION, SOLUTION INTRAVENOUS
Status: DISCONTINUED | OUTPATIENT
Start: 2023-10-09 | End: 2023-10-09 | Stop reason: HOSPADM

## 2023-10-09 RX ORDER — ONDANSETRON 4 MG/1
4 TABLET, ORALLY DISINTEGRATING ORAL EVERY 30 MIN PRN
Status: DISCONTINUED | OUTPATIENT
Start: 2023-10-09 | End: 2023-10-09 | Stop reason: HOSPADM

## 2023-10-09 RX ORDER — CLINDAMYCIN PHOSPHATE 900 MG/50ML
900 INJECTION, SOLUTION INTRAVENOUS SEE ADMIN INSTRUCTIONS
Status: DISCONTINUED | OUTPATIENT
Start: 2023-10-09 | End: 2023-10-09 | Stop reason: HOSPADM

## 2023-10-09 RX ORDER — HYDRALAZINE HYDROCHLORIDE 20 MG/ML
2.5-5 INJECTION INTRAMUSCULAR; INTRAVENOUS EVERY 10 MIN PRN
Status: DISCONTINUED | OUTPATIENT
Start: 2023-10-09 | End: 2023-10-09 | Stop reason: HOSPADM

## 2023-10-09 RX ORDER — FENTANYL CITRATE 50 UG/ML
INJECTION, SOLUTION INTRAMUSCULAR; INTRAVENOUS PRN
Status: DISCONTINUED | OUTPATIENT
Start: 2023-10-09 | End: 2023-10-09

## 2023-10-09 RX ORDER — LIDOCAINE HYDROCHLORIDE 20 MG/ML
INJECTION, SOLUTION INFILTRATION; PERINEURAL PRN
Status: DISCONTINUED | OUTPATIENT
Start: 2023-10-09 | End: 2023-10-09

## 2023-10-09 RX ORDER — ONDANSETRON 2 MG/ML
INJECTION INTRAMUSCULAR; INTRAVENOUS PRN
Status: DISCONTINUED | OUTPATIENT
Start: 2023-10-09 | End: 2023-10-09

## 2023-10-09 RX ORDER — CLINDAMYCIN PHOSPHATE 900 MG/50ML
900 INJECTION, SOLUTION INTRAVENOUS
Status: COMPLETED | OUTPATIENT
Start: 2023-10-09 | End: 2023-10-09

## 2023-10-09 RX ORDER — FENTANYL CITRATE 50 UG/ML
50 INJECTION, SOLUTION INTRAMUSCULAR; INTRAVENOUS EVERY 5 MIN PRN
Status: DISCONTINUED | OUTPATIENT
Start: 2023-10-09 | End: 2023-10-09 | Stop reason: HOSPADM

## 2023-10-09 RX ORDER — ONDANSETRON 2 MG/ML
4 INJECTION INTRAMUSCULAR; INTRAVENOUS EVERY 30 MIN PRN
Status: DISCONTINUED | OUTPATIENT
Start: 2023-10-09 | End: 2023-10-09 | Stop reason: HOSPADM

## 2023-10-09 RX ORDER — HYDROMORPHONE HCL IN WATER/PF 6 MG/30 ML
0.4 PATIENT CONTROLLED ANALGESIA SYRINGE INTRAVENOUS EVERY 5 MIN PRN
Status: DISCONTINUED | OUTPATIENT
Start: 2023-10-09 | End: 2023-10-09 | Stop reason: HOSPADM

## 2023-10-09 RX ORDER — HALOPERIDOL 5 MG/ML
1 INJECTION INTRAMUSCULAR
Status: DISCONTINUED | OUTPATIENT
Start: 2023-10-09 | End: 2023-10-09 | Stop reason: HOSPADM

## 2023-10-09 RX ORDER — OXYCODONE HYDROCHLORIDE 10 MG/1
10 TABLET ORAL
Status: DISCONTINUED | OUTPATIENT
Start: 2023-10-09 | End: 2023-10-09 | Stop reason: HOSPADM

## 2023-10-09 RX ORDER — DIPHENHYDRAMINE HCL 12.5MG/5ML
12.5 LIQUID (ML) ORAL EVERY 6 HOURS PRN
Status: DISCONTINUED | OUTPATIENT
Start: 2023-10-09 | End: 2023-10-09 | Stop reason: HOSPADM

## 2023-10-09 RX ORDER — DIPHENHYDRAMINE HYDROCHLORIDE 50 MG/ML
12.5 INJECTION INTRAMUSCULAR; INTRAVENOUS EVERY 6 HOURS PRN
Status: DISCONTINUED | OUTPATIENT
Start: 2023-10-09 | End: 2023-10-09 | Stop reason: HOSPADM

## 2023-10-09 RX ORDER — PROPOFOL 10 MG/ML
INJECTION, EMULSION INTRAVENOUS PRN
Status: DISCONTINUED | OUTPATIENT
Start: 2023-10-09 | End: 2023-10-09

## 2023-10-09 RX ORDER — HYDROMORPHONE HCL IN WATER/PF 6 MG/30 ML
0.2 PATIENT CONTROLLED ANALGESIA SYRINGE INTRAVENOUS EVERY 5 MIN PRN
Status: DISCONTINUED | OUTPATIENT
Start: 2023-10-09 | End: 2023-10-09 | Stop reason: HOSPADM

## 2023-10-09 RX ORDER — GLYCOPYRROLATE 0.2 MG/ML
INJECTION, SOLUTION INTRAMUSCULAR; INTRAVENOUS PRN
Status: DISCONTINUED | OUTPATIENT
Start: 2023-10-09 | End: 2023-10-09

## 2023-10-09 RX ORDER — OXYCODONE HYDROCHLORIDE 5 MG/1
5 TABLET ORAL
Status: DISCONTINUED | OUTPATIENT
Start: 2023-10-09 | End: 2023-10-09 | Stop reason: HOSPADM

## 2023-10-09 RX ORDER — FENTANYL CITRATE 50 UG/ML
25 INJECTION, SOLUTION INTRAMUSCULAR; INTRAVENOUS EVERY 5 MIN PRN
Status: DISCONTINUED | OUTPATIENT
Start: 2023-10-09 | End: 2023-10-09 | Stop reason: HOSPADM

## 2023-10-09 RX ORDER — SODIUM CHLORIDE, SODIUM LACTATE, POTASSIUM CHLORIDE, CALCIUM CHLORIDE 600; 310; 30; 20 MG/100ML; MG/100ML; MG/100ML; MG/100ML
INJECTION, SOLUTION INTRAVENOUS CONTINUOUS PRN
Status: DISCONTINUED | OUTPATIENT
Start: 2023-10-09 | End: 2023-10-09

## 2023-10-09 RX ORDER — SODIUM CHLORIDE, SODIUM LACTATE, POTASSIUM CHLORIDE, CALCIUM CHLORIDE 600; 310; 30; 20 MG/100ML; MG/100ML; MG/100ML; MG/100ML
INJECTION, SOLUTION INTRAVENOUS CONTINUOUS
Status: DISCONTINUED | OUTPATIENT
Start: 2023-10-09 | End: 2023-10-09 | Stop reason: HOSPADM

## 2023-10-09 RX ORDER — DEXAMETHASONE SODIUM PHOSPHATE 4 MG/ML
INJECTION, SOLUTION INTRA-ARTICULAR; INTRALESIONAL; INTRAMUSCULAR; INTRAVENOUS; SOFT TISSUE PRN
Status: DISCONTINUED | OUTPATIENT
Start: 2023-10-09 | End: 2023-10-09

## 2023-10-09 RX ADMIN — LIDOCAINE HYDROCHLORIDE 60 MG: 20 INJECTION, SOLUTION INFILTRATION; PERINEURAL at 15:53

## 2023-10-09 RX ADMIN — ONDANSETRON 4 MG: 2 INJECTION INTRAMUSCULAR; INTRAVENOUS at 16:07

## 2023-10-09 RX ADMIN — SODIUM CHLORIDE, POTASSIUM CHLORIDE, SODIUM LACTATE AND CALCIUM CHLORIDE: 600; 310; 30; 20 INJECTION, SOLUTION INTRAVENOUS at 15:43

## 2023-10-09 RX ADMIN — CLINDAMYCIN PHOSPHATE 900 MG: 900 INJECTION, SOLUTION INTRAVENOUS at 15:42

## 2023-10-09 RX ADMIN — FENTANYL CITRATE 50 MCG: 50 INJECTION INTRAMUSCULAR; INTRAVENOUS at 16:07

## 2023-10-09 RX ADMIN — DEXAMETHASONE SODIUM PHOSPHATE 4 MG: 4 INJECTION, SOLUTION INTRA-ARTICULAR; INTRALESIONAL; INTRAMUSCULAR; INTRAVENOUS; SOFT TISSUE at 16:06

## 2023-10-09 RX ADMIN — GENTAMICIN SULFATE 360 MG: 40 INJECTION, SOLUTION INTRAMUSCULAR; INTRAVENOUS at 12:30

## 2023-10-09 RX ADMIN — GLYCOPYRROLATE 0.2 MG: 0.2 INJECTION, SOLUTION INTRAMUSCULAR; INTRAVENOUS at 16:12

## 2023-10-09 RX ADMIN — PHENYLEPHRINE HYDROCHLORIDE 100 MCG: 10 INJECTION INTRAVENOUS at 16:15

## 2023-10-09 RX ADMIN — PROPOFOL 100 MG: 10 INJECTION, EMULSION INTRAVENOUS at 15:53

## 2023-10-09 ASSESSMENT — LIFESTYLE VARIABLES: TOBACCO_USE: 1

## 2023-10-09 ASSESSMENT — ACTIVITIES OF DAILY LIVING (ADL)
ADLS_ACUITY_SCORE: 41

## 2023-10-09 NOTE — DISCHARGE INSTRUCTIONS
[facility]  Same-Day Surgery   Adult Discharge Orders & Instructions     For 24 hours after surgery    Get plenty of rest.  A responsible adult must stay with you for at least 24 hours after you leave the hospital.   Do not drive or use heavy equipment.  If you have weakness or tingling, don't drive or use heavy equipment until this feeling goes away.  Do not drink alcohol.  Avoid strenuous or risky activities.  Ask for help when climbing stairs.   You may feel lightheaded.  IF so, sit for a few minutes before standing.  Have someone help you get up.   If you have nausea (feel sick to your stomach): Drink only clear liquids such as apple juice, ginger ale, broth or 7-Up.  Rest may also help.  Be sure to drink enough fluids.  Move to a regular diet as you feel able.  You may have a slight fever. Call the doctor if your fever is over 100 F (37.7 C) (taken under the tongue) or lasts longer than 24 hours.  You may have a dry mouth, a sore throat, muscle aches or trouble sleeping.  These should go away after 24 hours.  Do not make important or legal decisions.   Call your doctor for any of the followin.  Signs of infection (fever, growing tenderness at the surgery site, a large amount of drainage or bleeding, severe pain, foul-smelling drainage, redness, swelling).    2. It has been over 8 to 10 hours since surgery and you are still not able to urinate (pass water).    3.  Headache for over 24 hours.    4.  Numbness, tingling or weakness the day after surgery (if you had spinal anesthesia).  To contact a doctor, call Dr. Gwyn mortensen @ [phone]     or:    '   [phone] and ask for the resident on call for   Urology (answered 24 hours a day)  '   Emergency Department:    [facility]: [phone]       (TTY for hearing impaired: [phone])    [facility]: [phone]       (TTY for hearing impaired: [phone])

## 2023-10-09 NOTE — ANESTHESIA POSTPROCEDURE EVALUATION
Patient: Jenna Jhaveri    Procedure: Procedure(s):  CYSTOSCOPY, WITH PERIURETHRAL BULKING AGENT INJECTION  **Latex Allergy**       Anesthesia Type:  General    Note:  Disposition: Outpatient   Postop Pain Control: Uneventful            Sign Out: Well controlled pain   PONV: No   Neuro/Psych: Uneventful            Sign Out: Acceptable/Baseline neuro status   Airway/Respiratory: Uneventful            Sign Out: Acceptable/Baseline resp. status   CV/Hemodynamics: Uneventful            Sign Out: Acceptable CV status; No obvious hypovolemia; No obvious fluid overload   Other NRE: NONE   DID A NON-ROUTINE EVENT OCCUR? No           Last vitals:  Vitals Value Taken Time   /83 10/09/23 1700   Temp 36.1  C (97  F) 10/09/23 1700   Pulse 94 10/09/23 1704   Resp 13 10/09/23 1704   SpO2 96 % 10/09/23 1704   Vitals shown include unvalidated device data.    Electronically Signed By: Tony Adames MD  October 9, 2023  5:05 PM

## 2023-10-09 NOTE — ANESTHESIA PROCEDURE NOTES
Airway       Patient location during procedure: OR  Staff -        CRNA: Calista Infante APRN CRNA       Performed By: CRNAIndications and Patient Condition       Indications for airway management: martita-procedural       Induction type:intravenous       Mask difficulty assessment: 0 - not attempted    Final Airway Details       Final airway type: supraglottic airway    Supraglottic Airway Details        Type: LMA       Brand: I-Gel       LMA size: 4    Post intubation assessment        Placement verified by: capnometry, equal breath sounds and chest rise        Number of attempts at approach: 1       Secured with: paper tape       Ease of procedure: easy       Dentition: Intact and Unchanged

## 2023-10-09 NOTE — OP NOTE
October 9, 2023    Pre-op Dx: stress incontinence and intrinsic sphincter deficiency  Post-op Dx: Same   Procedure performed: cystoscopy and periurethral bulking with Bulkamid  Surgeon: Ivonne Shaikh MD  Assistant surgeons: Dayo Colindres MD  Anesthesia: General  EBL: 0cc   Complications: None   Disposition: Home after PACU    Clinical Indication: Ms. Jenna Jhaveri is a 73 year old female with a hx of florid stress incontinence thought to be secondary to ISD.  We discussed options and elected to proceed with the above.    Clinical Procedure:   Patient was identified correctly, adequate anesthesia was induced, and after time out patient was placed in the dorsal lithotomy position in supportive yellowfin stirrups with care in neural safety in positioning all four extremities.  She was cleaned and preparred in the usual sterile fashion.  The bulkamid cystoscope was then inserted through the urethra and into the bladder.  The urethra was unremarkable, open bladder neck and urethra.  The bladder was with severe trabeculation.  No tumors, diverticulae, or stones. Bilateral u/o's were in orthotopic position.   A needle was then inserted and the scope was withdrawn to the area of the mid urethra and inserted at the 7, 5, 2, and 10 o'clock positions until a nice coaptation of the urethra was observed.  A total of 2.5 syringes was injected in total.  There was very good coaptation at the end of the procedure. The cystoscope was then withdrawn.  The pt. Tolerated the procedure well.  She will need to void prior to discharge.     Addendum:    I, Ivonne Shaikh, was present and scrubbed for the entire case.  I agree with the note as above with changes made as needed. I spoke to her  over the telephone at the end of the case    Ivonne Shaikh MD MPH  (she/her/hers)   of Urology  [facility]

## 2023-10-09 NOTE — ANESTHESIA CARE TRANSFER NOTE
Patient: Jenna Jhaveri    Procedure: Procedure(s):  CYSTOSCOPY, WITH PERIURETHRAL BULKING AGENT INJECTION  **Latex Allergy**       Diagnosis: Intrinsic sphincter deficiency (ISD) [N36.42]  Neurogenic bladder [N31.9]  History of spinal cord injury [Z87.828]  Diagnosis Additional Information: No value filed.    Anesthesia Type:   General     Note:    Oropharynx: oropharynx clear of all foreign objects and spontaneously breathing  Level of Consciousness: drowsy  Oxygen Supplementation: nasal cannula  Level of Supplemental Oxygen (L/min / FiO2): 3 LPM  Independent Airway: airway patency satisfactory and stable  Dentition: dentition unchanged  Vital Signs Stable: post-procedure vital signs reviewed and stable  Report to RN Given: handoff report given  Patient transferred to: PACU    Handoff Report: Identifed the Patient, Identified the Reponsible Provider, Reviewed the pertinent medical history, Discussed the surgical course, Reviewed Intra-OP anesthesia mangement and issues during anesthesia, Set expectations for post-procedure period and Allowed opportunity for questions and acknowledgement of understanding      Vitals:  Vitals Value Taken Time   /78    Temp     Pulse 93 10/09/23 1632   Resp 10 10/09/23 1632   SpO2 94 % 10/09/23 1632   Vitals shown include unvalidated device data.    Electronically Signed By: YEIMI Alford CRNA  October 9, 2023  4:33 PM

## 2023-11-13 ENCOUNTER — OFFICE VISIT (OUTPATIENT)
Dept: UROLOGY | Facility: CLINIC | Age: 73
End: 2023-11-13
Payer: COMMERCIAL

## 2023-11-13 DIAGNOSIS — Z48.89 ENCOUNTER FOR POSTOPERATIVE CARE: Primary | ICD-10-CM

## 2023-11-13 PROCEDURE — 99024 POSTOP FOLLOW-UP VISIT: CPT | Performed by: PHYSICIAN ASSISTANT

## 2023-11-13 NOTE — PROGRESS NOTES
November 13, 2023    Post operative visit    Hx of florid stress incontinence thought to be secondary to ISD. S/p cysto with periurethral bulking injections by Dr. Shaikh on 10/9/23. Feels more pressure along the pelvic floor but no change in bladder symptoms since bulking. Occasional constipation. Taking stool softeners nightly and metamucil daily with occasional prunes. She denies any changes in her health since last visit.    Exam:  She is comfortable, in no distress, non-labored breathing.      Random bladder scan 25 mL     VUDS on 4/12/23  -Maximum cystometric capacity ~350 mL with absent filling sensations.  -Good bladder compliance without detrusor overactivity. There is a very minimal and gradual increase in detrusor pressure at volumes >200 mL. She leaks when PDet reaches ~6 cm H2O around 350 mL with near continuous urinary incontinence after this point.  -Multiple stress leaks at Pabd pressures ranging from 51-71 cm H2O starting at bladder volumes ~200 mL. Low ALPP suggestive for ISD.  -No appreciable detrusor contraction during voiding. She voids primarily by Valsalva effort. Voids 79 mL with slow (Qmax 8.4 ml/s), interrupted flow, some increased EMG activity, and incomplete bladder emptying ( mL).  -Difficult to evaluate for bladder outlet obstruction given no detrusor contraction during voiding, but would seem most likely that incomplete emptying is secondary to acontractile detrusor.  -Fluoroscopy reveals a mild/moderately trabeculated bladder wall without diverticuli or VUR. The bladder neck is open during filling and voiding.     A/P: 73 year old F s/p bulking agent    - No improvement of symptoms. Discussed repeating bulking with touch-up and will discuss with Dr. Shaikh.  - Will obtain UA/UC to ensure no infection. Patient declined wanting pediatric catheter (given bulking) and will try to void on her own.     Massiel Berrios PA-C  Urology    CC  Patient Care Team:  Arsalan Blas MD as  PCP - General  Keron Valencia MD as MD (Neurology)  Keron Valencia MD as Assigned Neuroscience Provider  Charlotte Chu PA-C as Physician Assistant (Urology)  Ivonne Shaikh MD as Assigned Surgical Provider  Arlin Gutiérrez MD as MD (Physical Medicine and Rehabilitation)  Ana Lilia Byrne APRN CNP as Nurse Practitioner (Colon & Rectal)  Massiel Berrios PA-C as Physician Assistant  [facility]

## 2023-11-13 NOTE — NURSING NOTE
Jenna Jhaveri's chief complaint for this visit includes:  Chief Complaint   Patient presents with    RECHECK     4 weeks post op DOS 10/9/23 with Dr. Shaikh     PCP: Arsalan Blas    Referring Provider:  Arsalan Blas MD  [address] [place] [address]    There were no vitals taken for this visit.    post void residual: 25 mls    Data Unavailable        Allergies   Allergen Reactions    Potassium Shortness Of Breath and Palpitations     IV Potassium    Statins [Statins] Other (See Comments)     Statin induced myopathy    Penicillin G Swelling    Latex Hives     NO REACTION BUT SCREENING SCORE OF 6    Other Drug Allergy (See Comments) Muscle Pain (Myalgia) and Other (See Comments)     Statin induced myopathy         Do you need any medication refills at today's visit? Maritza farfan Clinic Assistant- Surgical Specialties

## 2023-11-13 NOTE — NURSING NOTE
UA/UC orders and face sheet sent with patient as MG fax number for the resutls.     Faxed a back up order to [phone]     Bailey Stuart LPN on 11/13/2023 at 3:36 PM

## 2023-11-16 ENCOUNTER — OFFICE VISIT (OUTPATIENT)
Dept: SURGERY | Facility: CLINIC | Age: 73
End: 2023-11-16
Attending: PHYSICIAN ASSISTANT
Payer: COMMERCIAL

## 2023-11-16 ENCOUNTER — PRE VISIT (OUTPATIENT)
Dept: SURGERY | Facility: CLINIC | Age: 73
End: 2023-11-16

## 2023-11-16 VITALS
DIASTOLIC BLOOD PRESSURE: 83 MMHG | HEART RATE: 85 BPM | HEIGHT: 63 IN | SYSTOLIC BLOOD PRESSURE: 138 MMHG | BODY MASS INDEX: 35.5 KG/M2 | OXYGEN SATURATION: 95 %

## 2023-11-16 DIAGNOSIS — R15.9 FULL INCONTINENCE OF FECES: Primary | ICD-10-CM

## 2023-11-16 DIAGNOSIS — R32 BOWEL AND BLADDER INCONTINENCE: ICD-10-CM

## 2023-11-16 DIAGNOSIS — G83.4 CAUDA EQUINA COMPRESSION (H): ICD-10-CM

## 2023-11-16 DIAGNOSIS — R15.9 BOWEL AND BLADDER INCONTINENCE: ICD-10-CM

## 2023-11-16 DIAGNOSIS — Z98.890 S/P SPINAL SURGERY: ICD-10-CM

## 2023-11-16 PROCEDURE — 99204 OFFICE O/P NEW MOD 45 MIN: CPT | Performed by: NURSE PRACTITIONER

## 2023-11-16 ASSESSMENT — PAIN SCALES - GENERAL: PAINLEVEL: EXTREME PAIN (8)

## 2023-11-16 NOTE — LETTER
"2023       RE: Jenna Jhaveri  [address] [facility] [id number]     Dear Colleague,    Thank you for referring your patient, Jenna Jhaveri, to the [facility] COLON AND RECTAL SURGERY CLINIC [place] [facility]. Please see a copy of my visit note below.    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 Also does not have sensation. Cannot hold flatus. Only has bowel movements every few days and has complete incontinence with these. She cannot feel the stool coming out. Also complete urinary incontinence. Met with Dr. Shaikh of urology and was doing some bulking but had discussed possible SNS. Is also doing pelvic floor physical therapy.   Prior to her spinal surgery maybe had a small amount of stool leakage but no significant incontinence.  History of PE and restrictive lung disease secondary to kyphoscoliosis.  Colonoscopy 2018 with one tubular adenoma.  PSH: History of anal fistula with surgery in the remote past. Cholecystectomy. Two spinal surgeries.    Physical Examination:  /83 (BP Location: Left arm, Patient Position: Sitting, Cuff Size: Adult Regular)   Pulse 85   Ht 1.6 m (5' 3\")   SpO2 95%   BMI 35.50 kg/m    General: alert, oriented, in no acute distress, able to ambulate with rolling walker  HEENT: mucous membranes moist    Perianal external examination: Exam was chaperoned by Usman Caballero, EMT-P   Perianal skin: Intact with no excoriation or lichenification.  Lesions: No evidence of an external lesion, nodularity, or " induration in the perianal region.  Eversion of buttocks: There was not evidence of an anal fissure. Details: N/A.  Skin tags or external hemorrhoids: None.    Digital rectal examination: Was performed.   Sphincter tone: fair resting tone and no squeeze.  Palpable lesions: No.  Other: None.  Bimanual examination: was not performed    Anoscopy: Was deferred    Assessment/Plan: 73 year old female with fecal incontinence with cauda equina syndrome following spinal surgery with history of autoimmune necrotizing myopathy and restrictive lung disease on 3L oxygen. Discussed that I do not feel she would be a candidate for sacral nerve stimulator and is having incontinence of formed stool. Could try additional bulking with a fiber supplement but may need to consider colostomy for her incontinence. We discussed this at length and I discussed with Dr. Lion also. Would like her to get surgical clearance from pulmonology and neurology and have her colonoscopy and will then have her meet with Dr. Lion and ARTIS Friedman to discuss possible ostomy.     Medical history:  No past medical history on file.    Surgical history:  Past Surgical History:   Procedure Laterality Date    CYSTOSCOPY, INJECT COLLAGEN, COMBINED N/A 10/9/2023    Procedure: CYSTOSCOPY, WITH PERIURETHRAL BULKING AGENT INJECTION;  Surgeon: Ivonne Shaikh MD;  Location: [facility] OR    ORTHOPEDIC SURGERY      multiple back procedures, knee surgery, carpal tunnel       Problem list:    Patient Active Problem List    Diagnosis Date Noted    Intrinsic sphincter deficiency (ISD) 05/10/2023     Priority: Medium    Incontinence of feces, unspecified fecal incontinence type 05/10/2023     Priority: Medium    History of spinal cord injury 05/10/2023     Priority: Medium    S/P spinal surgery 05/10/2023     Priority: Medium    Neurogenic bladder 05/10/2023     Priority: Medium    Polymyositis (H) 05/10/2021     Priority: Medium        Medications:  Current Outpatient Medications   Medication Sig Dispense Refill    acetaminophen (TYLENOL) 500 MG tablet Take 500-1,000 mg by mouth every 8 hours as needed      albuterol (PROAIR HFA/PROVENTIL HFA/VENTOLIN HFA) 108 (90 Base) MCG/ACT inhaler Inhale 2 puffs into the lungs every 6 hours as needed for shortness of breath / dyspnea or wheezing      alendronate (FOSAMAX) 70 MG tablet Take 70 mg by mouth once a week Sundays      amLODIPine (NORVASC) 10 MG tablet Take 10 mg by mouth daily      aspirin (ASA) 81 MG chewable tablet Take 81 mg by mouth every evening      azaTHIOprine (IMURAN) 50 MG tablet Take 1 tablet (50 mg) twice daily-per dosage reduction instructions from last appointment. (Patient taking differently: Take 50 mg by mouth every evening) 60 tablet 1    Calcium-Vitamin D-Vitamin K 500-200-40 MG-UNT-MCG CHEW Take 1 chew tab by mouth daily 650 Ca - 500 D - 40 K      cholecalciferol 50 MCG (2000 UT) tablet Take 2,000 Units by mouth daily      DULoxetine (CYMBALTA) 60 MG capsule Take 60 mg by mouth daily      fluocinonide (LIDEX) 0.05 % external solution Apply topically daily as needed (scalp)      fluticasone (FLONASE) 50 MCG/ACT nasal spray Spray 1 spray in nostril daily as needed for rhinitis      fluticasone-salmeterol (ADVAIR) 250-50 MCG/DOSE inhaler Inhale 1 puff into the lungs 2 times daily      furosemide (LASIX) 20 MG tablet Take 20 mg by mouth daily as needed (ankle swelling)      gabapentin (NEURONTIN) 300 MG capsule Take 300 mg by mouth 3 times daily      ibuprofen (ADVIL/MOTRIN) 600 MG tablet Take 600 mg by mouth every 8 hours as needed      ipratropium - albuterol 0.5 mg/2.5 mg/3 mL (DUONEB) 0.5-2.5 (3) MG/3ML neb solution Inhale 3 mLs into the lungs every 4 hours as needed for shortness of breath      loratadine (CLARITIN) 10 MG tablet Take 10 mg by mouth daily      methocarbamol (ROBAXIN) 500 MG tablet Take 500 mg by mouth 3 times daily as needed for muscle spasms       "multivitamin w/minerals (MULTI-VITAMIN) tablet Take 1 tablet by mouth daily Uses here and there      naproxen sodium (ANAPROX) 220 MG tablet Take 220 mg by mouth every morning      nitrous oxide/oxygen 50/50 blend 1 application. by inhalation route as directed. 3 lpm continuous      omeprazole (PRILOSEC) 40 MG DR capsule Take 40 mg by mouth daily      primidone (MYSOLINE) 50 MG tablet Take 50 mg by mouth every morning      propranolol (INDERAL) 10 MG tablet Take 10 mg by mouth three times daily as needed (tremor).      propranolol ER (INDERAL LA) 80 MG 24 hr capsule Take 80 mg by mouth every morning      zinc sulfate (ZINCATE) 220 (50 Zn) MG capsule Take 220 mg by mouth daily         Allergies:  Allergies   Allergen Reactions    Potassium Shortness Of Breath and Palpitations     IV Potassium    Statins [Statins] Other (See Comments)     Statin induced myopathy    Penicillin G Swelling    Latex Hives     NO REACTION BUT SCREENING SCORE OF 6    Other Drug Allergy (See Comments) Muscle Pain (Myalgia) and Other (See Comments)     Statin induced myopathy       Family history:  Family History   Problem Relation Age of Onset    Anesthesia Reaction No family hx of     Deep Vein Thrombosis (DVT) No family hx of        Social history:  Social History     Tobacco Use    Smoking status: Former     Types: Cigarettes     Quit date:      Years since quittin.8     Passive exposure: Past    Smokeless tobacco: Never   Substance Use Topics    Alcohol use: Never    Marital status: .    Nursing Notes:   Usman Caballero, EMT  2023 12:07 PM  Signed  Chief Complaint   Patient presents with    New Patient       Vitals:    23 1200   BP: 138/83   BP Location: Left arm   Patient Position: Sitting   Cuff Size: Adult Regular   Pulse: 85   SpO2: 95%   Height: 5' 3\"       Body mass index is 35.5 kg/m .     Usman Caballero, EMT- P       45 minutes spent on the date of encounter performing chart review, history and exam, " documentation and further activities as noted above      This note was created using speech recognition software and may contain unintended word substitutions.        Again, thank you for allowing me to participate in the care of your patient.      Sincerely,    YEIMI Crespo CNP

## 2023-11-16 NOTE — PROGRESS NOTES
"Colon and Rectal Surgery Consult Clinic Note    Date: 2023     Referring provider:  Massiel Berrios PA-C  [address] [place] [address]     RE: Jenna Jhaveri  : 1950  LINK: 2023    Jenna Jhaveri is a very pleasant 73 year old female with autoimmune necrotizing myopathy and spinal surgery from spinal cord injury in 2022    HPI:  Had lost control of bowel and urine completely after her spinal surgery in May. Also does not have sensation. Cannot hold flatus. Only has bowel movements every few days and has complete incontinence with these. She cannot feel the stool coming out. Also complete urinary incontinence. Met with Dr. Shaikh of urology and was doing some bulking but had discussed possible SNS. Is also doing pelvic floor physical therapy.   Prior to her spinal surgery maybe had a small amount of stool leakage but no significant incontinence.  History of PE and restrictive lung disease secondary to kyphoscoliosis.  Colonoscopy 2018 with one tubular adenoma.  PSH: History of anal fistula with surgery in the remote past. Cholecystectomy. Two spinal surgeries.    Physical Examination:  /83 (BP Location: Left arm, Patient Position: Sitting, Cuff Size: Adult Regular)   Pulse 85   Ht 1.6 m (5' 3\")   SpO2 95%   BMI 35.50 kg/m    General: alert, oriented, in no acute distress, able to ambulate with rolling walker  HEENT: mucous membranes moist    Perianal external examination: Exam was chaperoned by Usman Caballero, EMT-P   Perianal skin: Intact with no excoriation or lichenification.  Lesions: No evidence of an external lesion, nodularity, or induration in the perianal region.  Eversion of buttocks: There was not evidence of an anal fissure. Details: N/A.  Skin tags or external hemorrhoids: None.    Digital rectal examination: Was performed.   Sphincter tone: fair resting tone and no squeeze.  Palpable lesions: No.  Other: None.  Bimanual examination: was not " performed    Anoscopy: Was deferred    Assessment/Plan: 73 year old female with fecal incontinence with cauda equina syndrome following spinal surgery with history of autoimmune necrotizing myopathy and restrictive lung disease on 3L oxygen. Discussed that I do not feel she would be a candidate for sacral nerve stimulator and is having incontinence of formed stool. Could try additional bulking with a fiber supplement but may need to consider colostomy for her incontinence. We discussed this at length and I discussed with Dr. Lion also. Would like her to get surgical clearance from pulmonology and neurology and have her colonoscopy and will then have her meet with Dr. Lion and ARTIS Friedman to discuss possible ostomy.     Medical history:  No past medical history on file.    Surgical history:  Past Surgical History:   Procedure Laterality Date    CYSTOSCOPY, INJECT COLLAGEN, COMBINED N/A 10/9/2023    Procedure: CYSTOSCOPY, WITH PERIURETHRAL BULKING AGENT INJECTION;  Surgeon: Ivonne Shaikh MD;  Location: [facility] OR    ORTHOPEDIC SURGERY      multiple back procedures, knee surgery, carpal tunnel       Problem list:    Patient Active Problem List    Diagnosis Date Noted    Intrinsic sphincter deficiency (ISD) 05/10/2023     Priority: Medium    Incontinence of feces, unspecified fecal incontinence type 05/10/2023     Priority: Medium    History of spinal cord injury 05/10/2023     Priority: Medium    S/P spinal surgery 05/10/2023     Priority: Medium    Neurogenic bladder 05/10/2023     Priority: Medium    Polymyositis (H) 05/10/2021     Priority: Medium       Medications:  Current Outpatient Medications   Medication Sig Dispense Refill    acetaminophen (TYLENOL) 500 MG tablet Take 500-1,000 mg by mouth every 8 hours as needed      albuterol (PROAIR HFA/PROVENTIL HFA/VENTOLIN HFA) 108 (90 Base) MCG/ACT inhaler Inhale 2 puffs into the lungs every 6 hours as needed for shortness of breath / dyspnea  or wheezing      alendronate (FOSAMAX) 70 MG tablet Take 70 mg by mouth once a week Sundays      amLODIPine (NORVASC) 10 MG tablet Take 10 mg by mouth daily      aspirin (ASA) 81 MG chewable tablet Take 81 mg by mouth every evening      azaTHIOprine (IMURAN) 50 MG tablet Take 1 tablet (50 mg) twice daily-per dosage reduction instructions from last appointment. (Patient taking differently: Take 50 mg by mouth every evening) 60 tablet 1    Calcium-Vitamin D-Vitamin K 500-200-40 MG-UNT-MCG CHEW Take 1 chew tab by mouth daily 650 Ca - 500 D - 40 K      cholecalciferol 50 MCG (2000 UT) tablet Take 2,000 Units by mouth daily      DULoxetine (CYMBALTA) 60 MG capsule Take 60 mg by mouth daily      fluocinonide (LIDEX) 0.05 % external solution Apply topically daily as needed (scalp)      fluticasone (FLONASE) 50 MCG/ACT nasal spray Spray 1 spray in nostril daily as needed for rhinitis      fluticasone-salmeterol (ADVAIR) 250-50 MCG/DOSE inhaler Inhale 1 puff into the lungs 2 times daily      furosemide (LASIX) 20 MG tablet Take 20 mg by mouth daily as needed (ankle swelling)      gabapentin (NEURONTIN) 300 MG capsule Take 300 mg by mouth 3 times daily      ibuprofen (ADVIL/MOTRIN) 600 MG tablet Take 600 mg by mouth every 8 hours as needed      ipratropium - albuterol 0.5 mg/2.5 mg/3 mL (DUONEB) 0.5-2.5 (3) MG/3ML neb solution Inhale 3 mLs into the lungs every 4 hours as needed for shortness of breath      loratadine (CLARITIN) 10 MG tablet Take 10 mg by mouth daily      methocarbamol (ROBAXIN) 500 MG tablet Take 500 mg by mouth 3 times daily as needed for muscle spasms      multivitamin w/minerals (MULTI-VITAMIN) tablet Take 1 tablet by mouth daily Uses here and there      naproxen sodium (ANAPROX) 220 MG tablet Take 220 mg by mouth every morning      nitrous oxide/oxygen 50/50 blend 1 application. by inhalation route as directed. 3 lpm continuous      omeprazole (PRILOSEC) 40 MG DR capsule Take 40 mg by mouth daily       "primidone (MYSOLINE) 50 MG tablet Take 50 mg by mouth every morning      propranolol (INDERAL) 10 MG tablet Take 10 mg by mouth three times daily as needed (tremor).      propranolol ER (INDERAL LA) 80 MG 24 hr capsule Take 80 mg by mouth every morning      zinc sulfate (ZINCATE) 220 (50 Zn) MG capsule Take 220 mg by mouth daily         Allergies:  Allergies   Allergen Reactions    Potassium Shortness Of Breath and Palpitations     IV Potassium    Statins [Statins] Other (See Comments)     Statin induced myopathy    Penicillin G Swelling    Latex Hives     NO REACTION BUT SCREENING SCORE OF 6    Other Drug Allergy (See Comments) Muscle Pain (Myalgia) and Other (See Comments)     Statin induced myopathy       Family history:  Family History   Problem Relation Age of Onset    Anesthesia Reaction No family hx of     Deep Vein Thrombosis (DVT) No family hx of        Social history:  Social History     Tobacco Use    Smoking status: Former     Types: Cigarettes     Quit date:      Years since quittin.8     Passive exposure: Past    Smokeless tobacco: Never   Substance Use Topics    Alcohol use: Never    Marital status: .    Nursing Notes:   Usman Caballero, EMT  2023 12:07 PM  Signed  Chief Complaint   Patient presents with    New Patient       Vitals:    23 1200   BP: 138/83   BP Location: Left arm   Patient Position: Sitting   Cuff Size: Adult Regular   Pulse: 85   SpO2: 95%   Height: 5' 3\"       Body mass index is 35.5 kg/m .     Usman Caballero, EMT- P       45 minutes spent on the date of encounter performing chart review, history and exam, documentation and further activities as noted above    YEIMI Ayoub, NP-C  Colon and Rectal Surgery   [facility]    This note was created using speech recognition software and may contain unintended word substitutions.    "

## 2023-11-16 NOTE — NURSING NOTE
"Chief Complaint   Patient presents with    New Patient       Vitals:    11/16/23 1200   BP: 138/83   BP Location: Left arm   Patient Position: Sitting   Cuff Size: Adult Regular   Pulse: 85   SpO2: 95%   Height: 5' 3\"       Body mass index is 35.5 kg/m .     Usman Caballero, EMT- P    "

## 2023-11-16 NOTE — PATIENT INSTRUCTIONS
Start a daily fiber supplement such as Citrucel or Metamucil powder to bulk up stools. Start with once a day and slowly increase up to three times a day, if needed, over the next 4-6 weeks  Colonoscopy  Meet with pulmonology and neurology to ensure they are okay with you having abdominal surgery  Return to meet with Dr. Lion and Keila Joiner, wound ostomy nursing

## 2023-11-24 NOTE — PROGRESS NOTES
DISCHARGE  Reason for Discharge: No further expectation of progress. Patient is going to continue with further investigation for options for the bowel and bladder. Slight improvements in PT, see note.    08/16/23 0500   Appointment Info   Signing clinician's name / credentials Jessica Stover PT   Total/Authorized Visits cert needed on 9/12/23   Visits Used 5/10   Medical Diagnosis Neurogenic bladder (N31.9)     S/P spinal surgery (Z98.890)     History of spinal cord injury (Z87.828)     Incontinence of feces, unspecified fecal incontinence type (R15.9)   PT Tx Diagnosis urinary incontinence, fecal incontinence, neurogenic bladder, pelvic floor dysfunction   Precautions/Limitations fall precautions   Quick Adds Certification;Pelvic Consent   Progress Note/Certification   Start of Care Date 06/15/23   Onset of illness/injury or Date of Surgery 05/17/22   Therapy Frequency 1x every 1-3 wks   Predicted Duration 90 days   Certification date from 06/15/23   Certification date to 09/12/23   Progress Note Due Date 09/12/23   GOALS   PT Goals 2;3   PT Goal 1   Goal Identifier 1   Goal Description Patient has the urge for urination and is able to urinate on the toilet at the time on the urge, and 50% less urine leaking. 50% less pad use.   Target Date 09/12/23   PT Goal 2   Goal Identifier 2   Goal Description Patient has urge for BM, can have BM on the toilet every other day Carbon #4 and no fecal incontinence in pad   Target Date 09/12/23   PT Goal 3   Goal Identifier 3   Goal Description Patient no longer has rectal pain   Target Date 09/12/23   Subjective Report   Subjective Report This past 2 wks some big diaper blow outs, but the belly big blow out helps with urinating and the pain is better at the rectum.   Objective Measure 1   Objective Measure urge for urination   Details none   Objective Measure 2   Objective Measure urge for BM   Details none   Objective Measure 3   Objective Measure urinary incontinence    Details all the time   Objective Measure 4   Objective Measure bowel incontinence   Details same   Objective Measure 5   Objective Measure pain at the rectum   Details about 50% better   Objective Measure 6   Objective Measure Biofeedback 7/25/23   Details biofeedback to the PF, perianal electrodes in supine, pt was able to deflect the bio with the add assist and the TA, but very tough for concrete results due to elcrodes being wet with urine leaking.   Therapeutic Procedure/Exercise   Therapeutic Procedures: strength, endurance, ROM, flexibillity minutes (07911) 45   Ther Proc 1 - Details instructed pt to now advance as able the Kegals to 7-10 sec holds concentrating yet on the TA and the add assist. Also to cont with the belly big and blow out, but not too long on the toilet so about 8 trys and then get off the toilet.   Patient Response/Progress pt pain is 50% better at the rectum, maybe emptying the bladder a little better with the belly big and blow out. Otherwise the incontinence for bowels and bladder is the same   Education   Learner/Method Patient;Family;Listening;Reading;Demonstration   Plan   Home program TA activation in reclined and sitting 3x per day 5 reps, Kegal for 5 sec with add asssit 5x 3x per day. Toilet position ,, metameucal every am, 2 prunes in daytime.   Plan for next session on hold pt meeting with urology team and will call PT with plan   Comments   Pelvic Health Informed Consent Statement Discussed with patient/guardian reason for referral regarding pelvic health needs and external/internal pelvic floor muscle examination.  Opportunity provided to ask questions and verbal consent for assessment and intervention was given.   Total Session Time   Timed Code Treatment Minutes 45   Total Treatment Time (sum of timed and untimed services) 45             Discharge Plan: Patient to continue home program.    Referring Provider:  Ivonne Shaikh

## 2023-12-26 DIAGNOSIS — G72.49 AUTOIMMUNE NECROTIZING MYOPATHY: ICD-10-CM

## 2023-12-26 RX ORDER — AZATHIOPRINE 50 MG/1
50 TABLET ORAL EVERY EVENING
Qty: 30 TABLET | Refills: 3 | Status: SHIPPED | OUTPATIENT
Start: 2023-12-26

## 2024-02-19 ENCOUNTER — PRE VISIT (OUTPATIENT)
Dept: UROLOGY | Facility: CLINIC | Age: 74
End: 2024-02-19
Payer: COMMERCIAL

## 2024-02-20 ENCOUNTER — PRE VISIT (OUTPATIENT)
Dept: UROLOGY | Facility: CLINIC | Age: 74
End: 2024-02-20

## 2024-02-29 ENCOUNTER — TELEPHONE (OUTPATIENT)
Dept: SURGERY | Facility: CLINIC | Age: 74
End: 2024-02-29
Payer: COMMERCIAL

## 2024-02-29 NOTE — TELEPHONE ENCOUNTER
[facility] Call Center    Phone Message    May a detailed message be left on voicemail: yes     Reason for Call: Other: Pt is requesting a call back from Marisel please to discuss the next steps in her care plan. Please advise. Thank you!      Action Taken: Message routed to:  [facility] Colon [facility] Rectal     Travel Screening: Not Applicable

## 2024-03-01 NOTE — TELEPHONE ENCOUNTER
Virginia calls with an update on the following plan   Per note from Ana Lilia Vazquez NP 11/16/23  Start a daily fiber supplement such as Citrucel or Metamucil powder to bulk up stools. Start with once a day and slowly increase up to three times a day, if needed, over the next 4-6 weeks  Colonoscopy  Meet with pulmonology and neurology to ensure they are okay with you having abdominal surgery  Return to meet with Dr. Lion and Keila Joiner, wound ostomy nursing    She will see the following teams:   Neurology 3/1/23  Pulm in April  Colonoscopy in May *   Will see Dr. Lion and Keila on June 20 to discuss ostomy

## 2024-03-01 NOTE — TELEPHONE ENCOUNTER
Diagnosis, Referred by & from: Discuss Ostomy   Appt date: 6/20/2024   NOTES STATUS DETAILS   OFFICE NOTE from referring provider [facility]:  11/16/23 - CR OV with Ana Lilia Byrne NP   OFFICE NOTE from other specialist Care Everywhere / [facility]:  5/6/24 - URO OV with Dr. Duarte  4/11/24 - URO OV with Dr. Hawa Staley - [facility]:  11/13/23, 8/22/23 - URO OV with DIMAS Lopez  8/30/23, 4/25/23 - URO OV with Dr. Hawa Staley - [facility]:  8/16/23 - PT OV with Jessica Stover PT     [facility]:  8/9/22, 10/27/21 - URO OV with Dr. Donahue  11/17/21 - PCC OV with Lacey Boyce NP   DISCHARGE SUMMARY from hospital Care Everywhere [facility]:  2/14/23 - Admission with Dr. Saavedra  5/17/22 - Admission with Dr. Saavedra     [facility]:  6/29/22 - Admission with Dr. Smith   DISCHARGE REPORT from the ER N/A    OPERATIVE REPORT [facility]:  10/9/23 - OP Note for CYSTOSCOPY WITH PERIURETHRAL BULKING AGENT INJECTION with Dr. Shaikh   MEDICATION LIST Internal    LABS     BIOPSIES/PATHOLOGY RELATED TO DIAGNOSIS Care Everywhere [facility]:  12/19/18 - Colon Biopsy (Case: [id number])   DIAGNOSTIC PROCEDURES     COLONOSCOPY (most recent all time after 5 years) Care Everywhere [facility]:  12/19/18 - Colonoscopuy   IMAGING (DISC & REPORT)      CT Received [facility]:  3/9/22 - CT Abd/Pelvis  10/16/20 - CT Abd/Pelvis   XRAY Received [facility]:  7/4/22 - XR Abdomen

## 2024-04-01 ENCOUNTER — OFFICE VISIT (OUTPATIENT)
Dept: NEUROLOGY | Facility: CLINIC | Age: 74
End: 2024-04-01
Payer: COMMERCIAL

## 2024-04-01 ENCOUNTER — LAB (OUTPATIENT)
Dept: LAB | Facility: CLINIC | Age: 74
End: 2024-04-01
Payer: COMMERCIAL

## 2024-04-01 VITALS
DIASTOLIC BLOOD PRESSURE: 80 MMHG | RESPIRATION RATE: 20 BRPM | OXYGEN SATURATION: 91 % | HEART RATE: 74 BPM | SYSTOLIC BLOOD PRESSURE: 131 MMHG

## 2024-04-01 DIAGNOSIS — G72.49 AUTOIMMUNE NECROTIZING MYOPATHY: ICD-10-CM

## 2024-04-01 DIAGNOSIS — G72.49 AUTOIMMUNE NECROTIZING MYOPATHY: Primary | ICD-10-CM

## 2024-04-01 DIAGNOSIS — Z79.899 LONG TERM CURRENT USE OF IMMUNOSUPPRESSIVE DRUG: ICD-10-CM

## 2024-04-01 LAB
ALT SERPL W P-5'-P-CCNC: 12 U/L (ref 0–50)
AST SERPL W P-5'-P-CCNC: 17 U/L (ref 0–45)
BASOPHILS # BLD AUTO: 0 10E3/UL (ref 0–0.2)
BASOPHILS NFR BLD AUTO: 1 %
CK SERPL-CCNC: 30 U/L (ref 26–192)
EOSINOPHIL # BLD AUTO: 0.2 10E3/UL (ref 0–0.7)
EOSINOPHIL NFR BLD AUTO: 4 %
ERYTHROCYTE [DISTWIDTH] IN BLOOD BY AUTOMATED COUNT: 15.1 % (ref 10–15)
HCT VFR BLD AUTO: 45.4 % (ref 35–47)
HGB BLD-MCNC: 14.2 G/DL (ref 11.7–15.7)
IMM GRANULOCYTES # BLD: 0 10E3/UL
IMM GRANULOCYTES NFR BLD: 0 %
LYMPHOCYTES # BLD AUTO: 0.9 10E3/UL (ref 0.8–5.3)
LYMPHOCYTES NFR BLD AUTO: 15 %
MCH RBC QN AUTO: 29.1 PG (ref 26.5–33)
MCHC RBC AUTO-ENTMCNC: 31.3 G/DL (ref 31.5–36.5)
MCV RBC AUTO: 93 FL (ref 78–100)
MONOCYTES # BLD AUTO: 0.6 10E3/UL (ref 0–1.3)
MONOCYTES NFR BLD AUTO: 10 %
NEUTROPHILS # BLD AUTO: 4.3 10E3/UL (ref 1.6–8.3)
NEUTROPHILS NFR BLD AUTO: 70 %
NRBC # BLD AUTO: 0 10E3/UL
NRBC BLD AUTO-RTO: 0 /100
PLATELET # BLD AUTO: 295 10E3/UL (ref 150–450)
RBC # BLD AUTO: 4.88 10E6/UL (ref 3.8–5.2)
WBC # BLD AUTO: 6.1 10E3/UL (ref 4–11)

## 2024-04-01 PROCEDURE — 84460 ALANINE AMINO (ALT) (SGPT): CPT | Performed by: PATHOLOGY

## 2024-04-01 PROCEDURE — 85025 COMPLETE CBC W/AUTO DIFF WBC: CPT | Performed by: PATHOLOGY

## 2024-04-01 PROCEDURE — 99214 OFFICE O/P EST MOD 30 MIN: CPT | Performed by: PSYCHIATRY & NEUROLOGY

## 2024-04-01 PROCEDURE — 84450 TRANSFERASE (AST) (SGOT): CPT | Performed by: PATHOLOGY

## 2024-04-01 PROCEDURE — 82550 ASSAY OF CK (CPK): CPT | Performed by: PATHOLOGY

## 2024-04-01 PROCEDURE — 36415 COLL VENOUS BLD VENIPUNCTURE: CPT | Performed by: PATHOLOGY

## 2024-04-01 RX ORDER — PREDNISONE 20 MG/1
TABLET ORAL DAILY
COMMUNITY
Start: 2023-10-25

## 2024-04-01 ASSESSMENT — PAIN SCALES - GENERAL: PAINLEVEL: SEVERE PAIN (6)

## 2024-04-01 NOTE — PATIENT INSTRUCTIONS
You can stop the azathioprine now.  Your muscle disease is in remission for 3 years.    Follow-up with Dr. Ann. Recommend checking CK once to twice per year    The ongoing weakness in the right leg, loss of feeling in the private area, and trouble controlling bowel and bladder, are all sequela of cauda equina syndrome and injury in your low back before and after the surgery you had 2 years ago.  If the right leg weakness has not returned to normal in 2 years, the prognosis is a bit guarded.  Please continue following with your urologist, your local neurologist, and pain clinic to address those problems.  A consultation with a local physiatrist may also help.    Your daughter may call our Neurology clinic at  to get evaluated by one of my colleagues for her right arm swelling and redness.  We usually require referral from her primary care doctor to do this.

## 2024-04-01 NOTE — PROGRESS NOTES
Arsalan Blas MD (PCP)    Bonnie Ann MD   [address] [place] [address]    [place], March 20, 2023     Dear Dr. Ann,      I had the pleasure to see Virginia Jhaveri in follow-up at the [facility] neuromuscular clinic today.  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  She was treated in the past with IVIG and high-dose corticosteroids.  Since August 2021 she is on azathioprine, and now she is on monotherapy at only 50 mg daily.  She had labs today including CK that was low (below 100), and normal AST, ALT, and CBC.    In addition to the myopathy, Virginia has a complex history of lumbar spine injury.  She had a previous fusion many years ago for scoliosis, which was complicated by pseudarthrosis and flatback syndrome. There was possibly bone fracture at the site of fusion at L2, which required revision surgery done last year in 5/17/2022. The surgery was an elective  OSTEOTOMY PEDICLE SUBTRACTION L4, INSTRUMENTATION REPLACEMENT T12-S1,PELVIC FIXATION, POSTERIOR SPINE FUSION L3-L5, INSTRUMENTATION AND BONE GRAFT, DURAL REPAIR AND DRAIN PLACEMENT. During the procedure a big dural tear was discovered with exposure of the arachnoid and cauda equina roots. Unfortunately, after the revision she was found with bilateral right more than left leg pain and weakness.  She still has numbness in the entire right leg from the buttock to the thigh and calf.  In February 2023 she had to undergo repeat surgery, for reinstrumentation at right L5-S1.   She feels overall that her right leg weakness is stable since May 2022, but not improved.  What bothers her most is that she continues to have perineal anesthesia to hot and cold water, and bouts of neuropathic pain.  She also also describing pain of the right buttock and groin, and she recently was seen by a pain clinic and an SI joint injection was recommended, to rule out the SI joint as  the pain source.  The patient was a little uncomfortable getting this injection done, until she would talk to me today.  Lastly, she continues to have bowel and bladder incontinence and loss of feeling.  She is working with urology at the [facility] for this problem, and the option of a urostomy and colostomy was recently discussed with her.    She also has essential tremor, for which she is following with you, Dr Ann, in [facility].  Her primidone dose was recently increased from 50 mg a day to 50 mg twice daily.    Mrs. Jhaveri currently has no concerns about upper extremity weakness, numbness, or clumsiness.  She has no dysphagia, dysarthria, or head drop.  She has stable dyspnea on exertion and restrictive lung physiology, due to her kyphoscoliosis, for which she is chronically on oxygen.    /80 (BP Location: Right arm, Patient Position: Sitting, Cuff Size: Adult Regular)   Pulse 74   Resp 20   SpO2 91%     EXAM: She has no ptosis, ophthalmoparesis, or nystagmus.  There is no weakness of orbicularis oculi, orbicularis oris, uvula, jaw, palate, or tongue.  Strength of neck flexion and extension is 5.  Strength in the upper extremities is 5/5 proximally and distally, except for the APB muscles that were rated 4.  At the lower extremities her right hip flexion is 3 to 4 -, right knee extension is 4+, right knee flexion is 4 -, and right foot dorsiflexion is 4.  All those muscle groups on the left were rated 5.  Gait was not retested.    In summary, Mrs Jimeness autoimmune necrotizing myopathy is in complete remission for over 3 years.  Her CK today was low again.  At this point I think it is okay to stop the azathioprine and stay off immunotherapy.  I am asking her to follow-up with you locally, and all she needs is checking her CK once to twice a year.  If CK remains low, it is unlikely that the disease has relapsed, and she will not need to be put back on immunotherapy.  If CK becomes  markedly elevated in the future, please contact me again.    Most of her current disability is unfortunately a result of cauda equina syndrome, that developed around the time of her lumbar spine surgery in 5/2022.  I told her that the prognosis from this is guarded.  When it has been 2 years since this type of nerve injury, further recovery is typically limited.  It is concerning that she continues to have perineal anesthesia and sphincter dysfunction, and I am not optimistic that those will improve.  I recommend continued management of those problems locally by her pain clinic, local neurologist, and our urologist at the [facility].  I am afraid I do not have much to add in that regard.    It is my opinion that she is stable from a neuromuscular standpoint to undergo an SI joint injection, or even a urological or colostomy procedure, if felt to be necessary in the future.      I will see her in follow-up as necessary.  Thank you for allowing to participate in her care.    Sincerely,    Keron Valencia MD, FAAN    Total time spent on this encounter today 30 minutes of which 15 face-to-face, 10 in postvisit note dictation, editing, and orders, and 5 in previsit chart review.

## 2024-04-01 NOTE — LETTER
4/1/2024       RE: Jenna Jhaveri  [address] [facility] [address]     Dear Colleague,    Thank you for referring your patient, Jenna Jhaveri, to the [facility] NEUROLOGY CLINIC [place] [facility]. Please see a copy of my visit note below.    Arsalan Blas MD (PCP)    Bonnie Ann MD   [address] [place] [id number]    [place], March 20, 2023     Dear Dr. Ann,      I had the pleasure to see [place] Jhaveri in follow-up at the [facility] neuromuscular clinic today.  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  She was treated in the past with IVIG and high-dose corticosteroids.  Since August 2021 she is on azathioprine, and now she is on monotherapy at only 50 mg daily.  She had labs today including CK that was low (below 100), and normal AST, ALT, and CBC.    In addition to the myopathy, Virginia has a complex history of lumbar spine injury.  She had a previous fusion many years ago for scoliosis, which was complicated by pseudarthrosis and flatback syndrome. There was possibly bone fracture at the site of fusion at L2, which required revision surgery done last year in 5/17/2022. The surgery was an elective  OSTEOTOMY PEDICLE SUBTRACTION L4, INSTRUMENTATION REPLACEMENT T12-S1,PELVIC FIXATION, POSTERIOR SPINE FUSION L3-L5, INSTRUMENTATION AND BONE GRAFT, DURAL REPAIR AND DRAIN PLACEMENT. During the procedure a big dural tear was discovered with exposure of the arachnoid and cauda equina roots. Unfortunately, after the revision she was found with bilateral right more than left leg pain and weakness.  She still has numbness in the entire right leg from the buttock to the thigh and calf.  In February 2023 she had to undergo repeat surgery, for reinstrumentation at right L5-S1.   She feels overall that her right leg weakness is stable since May 2022, but  not improved.  What bothers her most is that she continues to have perineal anesthesia to hot and cold water, and bouts of neuropathic pain.  She also also describing pain of the right buttock and groin, and she recently was seen by a pain clinic and an SI joint injection was recommended, to rule out the SI joint as the pain source.  The patient was a little uncomfortable getting this injection done, until she would talk to me today.  Lastly, she continues to have bowel and bladder incontinence and loss of feeling.  She is working with urology at the [facility] for this problem, and the option of a urostomy and colostomy was recently discussed with her.    She also has essential tremor, for which she is following with you, Dr Ann, in [facility].  Her primidone dose was recently increased from 50 mg a day to 50 mg twice daily.    Mrs. Jhaveri currently has no concerns about upper extremity weakness, numbness, or clumsiness.  She has no dysphagia, dysarthria, or head drop.  She has stable dyspnea on exertion and restrictive lung physiology, due to her kyphoscoliosis, for which she is chronically on oxygen.    /80 (BP Location: Right arm, Patient Position: Sitting, Cuff Size: Adult Regular)   Pulse 74   Resp 20   SpO2 91%     EXAM: She has no ptosis, ophthalmoparesis, or nystagmus.  There is no weakness of orbicularis oculi, orbicularis oris, uvula, jaw, palate, or tongue.  Strength of neck flexion and extension is 5.  Strength in the upper extremities is 5/5 proximally and distally, except for the APB muscles that were rated 4.  At the lower extremities her right hip flexion is 3 to 4 -, right knee extension is 4+, right knee flexion is 4 -, and right foot dorsiflexion is 4.  All those muscle groups on the left were rated 5.  Gait was not retested.    In summary, Mrs Jhaveri's autoimmune necrotizing myopathy is in complete remission for over 3 years.  Her CK today was low again.  At this point I  think it is okay to stop the azathioprine and stay off immunotherapy.  I am asking her to follow-up with you locally, and all she needs is checking her CK once to twice a year.  If CK remains low, it is unlikely that the disease has relapsed, and she will not need to be put back on immunotherapy.  If CK becomes markedly elevated in the future, please contact me again.    Most of her current disability is unfortunately a result of cauda equina syndrome, that developed around the time of her lumbar spine surgery in 5/2022.  I told her that the prognosis from this is guarded.  When it has been 2 years since this type of nerve injury, further recovery is typically limited.  It is concerning that she continues to have perineal anesthesia and sphincter dysfunction, and I am not optimistic that those will improve.  I recommend continued management of those problems locally by her pain clinic, local neurologist, and our urologist at the [facility].  I am afraid I do not have much to add in that regard.    It is my opinion that she is stable from a neuromuscular standpoint to undergo an SI joint injection, or even a urological or colostomy procedure, if felt to be necessary in the future.      I will see her in follow-up as necessary.  Thank you for allowing to participate in her care.      Total time spent on this encounter today 30 minutes of which 15 face-to-face, 10 in postvisit note dictation, editing, and orders, and 5 in previsit chart review.               Again, thank you for allowing me to participate in the care of your patient.      Sincerely,    Keron Valencia MD

## 2024-04-01 NOTE — NURSING NOTE
Chief Complaint   Patient presents with    RECHECK     /80 (BP Location: Right arm, Patient Position: Sitting, Cuff Size: Adult Regular)   Pulse 74   Resp 20   SpO2 91%     KATHRYN FILIBERTO

## 2024-04-11 ENCOUNTER — OFFICE VISIT (OUTPATIENT)
Dept: UROLOGY | Facility: CLINIC | Age: 74
End: 2024-04-11
Payer: COMMERCIAL

## 2024-04-11 VITALS — HEIGHT: 63 IN | BODY MASS INDEX: 35.44 KG/M2 | WEIGHT: 200 LBS

## 2024-04-11 DIAGNOSIS — N36.42 INTRINSIC SPHINCTER DEFICIENCY (ISD): ICD-10-CM

## 2024-04-11 DIAGNOSIS — Z98.890 S/P SPINAL SURGERY: ICD-10-CM

## 2024-04-11 DIAGNOSIS — N39.46 MIXED INCONTINENCE: ICD-10-CM

## 2024-04-11 DIAGNOSIS — R15.9 BOWEL AND BLADDER INCONTINENCE: ICD-10-CM

## 2024-04-11 DIAGNOSIS — N39.0 URINARY TRACT INFECTION WITHOUT HEMATURIA, SITE UNSPECIFIED: Primary | ICD-10-CM

## 2024-04-11 DIAGNOSIS — N31.9 NEUROGENIC BLADDER: ICD-10-CM

## 2024-04-11 DIAGNOSIS — R32 BOWEL AND BLADDER INCONTINENCE: ICD-10-CM

## 2024-04-11 DIAGNOSIS — N95.2 ATROPHIC VAGINITIS: ICD-10-CM

## 2024-04-11 DIAGNOSIS — G83.4 CAUDA EQUINA COMPRESSION (H): ICD-10-CM

## 2024-04-11 DIAGNOSIS — Z87.828 HISTORY OF SPINAL CORD INJURY: ICD-10-CM

## 2024-04-11 DIAGNOSIS — R39.89 URINARY PROBLEM: ICD-10-CM

## 2024-04-11 PROCEDURE — 99213 OFFICE O/P EST LOW 20 MIN: CPT | Mod: GC | Performed by: UROLOGY

## 2024-04-11 RX ORDER — ESTRADIOL 0.1 MG/G
1 CREAM VAGINAL
Qty: 42.5 G | Refills: 11 | Status: SHIPPED | OUTPATIENT
Start: 2024-04-12

## 2024-04-11 ASSESSMENT — PAIN SCALES - GENERAL: PAINLEVEL: MILD PAIN (3)

## 2024-04-11 NOTE — LETTER
4/11/2024       RE: Jenna Jhaveri  [address] [facility] [address]     Dear Colleague,    Thank you for referring your patient, Jenna Jhaveri, to the [facility] UROLOGY CLINIC [place] at [facility]. Please see a copy of my visit note below.    April 11, 2024    Virginia was seen today for recheck.    Diagnoses and all orders for this visit:    Urinary tract infection without hematuria, site unspecified    Urinary problem    Mixed incontinence    Intrinsic sphincter deficiency (ISD)    History of spinal cord injury    Bowel and bladder incontinence    S/P spinal surgery    Cauda equina compression (H)    Neurogenic bladder     74 year old female with NGB, hx of SCI, with florid BELKYS, with no sensation of voiding, and no detrusor function on UDS. She tried bulking agent, which did not improve her symptoms.  She has had a few culture proven UTI in the last few months and is very bothersome. We discussed about options to help her urinary symptoms.  For the BELKYS, we discussed PFPT which she has tried, repeating bulking agent, and more definitively more invasive options such as either AUS vs bladder neck closure with urinary diversion with SPT or catheterizable channel.  For recurrent infections, she will try some vaginal estrogen  She is not the most ideal surgical candidate with her PMH, being on home O2, and history of PE. She understands the risk, and would like to see Dr. Duarte to further discuss the options to see if diversion or maybe AUS may be helpful given her significant incontinence    Addendum:    The patient was seen and evaluated with the resident.  The plan was formulated in conjunction with me and I agree with the note with changes made as necessary.    She has terrible stress incontinence and no detrusor contraction, bulking did not help.  Although she is a higher risk surgical candidate some considerations would be urinar diversion,  "AUS or cath channel with BN closure.  I will refer her to my colleague Dr Duarte who does more of these procedures    20 minutes were spent today on the date of the encounter in reviewing the EMR including labs, direct patient care including, coordination of care, and documentation.    Ivonne Shaikh MD MPH  (she/her/hers)   of Urology  [facility]      Subjective    Her symptoms aren't better with the bulking agent, still leaking floridly.  Recent UTIs, last one was last month.  She denies any changes in health since last visit    Ht 1.6 m (5' 3\")   Wt 90.7 kg (200 lb)   BMI 35.43 kg/m    GENERAL: healthy, alert and no distress  EYES: Eyes grossly normal to inspection, conjunctivae and sclerae normal  HENT: normal cephalic/atraumatic.  External ears, nose and mouth without ulcers or lesions.  RESP: no audible wheeze, cough, or visible cyanosis.  No visible retractions or increased work of breathing.  Able to speak fully in complete sentences.  NEURO: Cranial nerves grossly intact, mentation intact and speech normal  PSYCH: mentation appears normal, affect normal/bright, judgement and insight intact, normal speech and appearance well-groomed    Labs/imaging/pathology  Recent Labs   Lab Test 04/01/24  1101 09/18/23  1302 03/20/23  1408   WBC 6.1 7.8 5.0   HGB 14.2 14.0 12.6   CR  --  0.56  --           CC  Patient Care Team:  Arsalan Blas MD as PCP - General  Keron Valencia MD as MD (Neurology)  Keron Valencia MD as Assigned Neuroscience Provider  Charlotte Chu PA-C as Physician Assistant (Urology)  Arlin Gutiérrez MD as MD (Physical Medicine and Rehabilitation)  Ana Lilia Byrne APRN CNP as Nurse Practitioner (Colon & Rectal)  Massiel Berrios PA-C as Physician Assistant  Ivonne Shaikh MD as MD (Urology)  Massiel Berrios PA-C as Assigned Surgical Provider  SELF, REFERRED    "

## 2024-04-11 NOTE — PATIENT INSTRUCTIONS
Use the estrogen cream as directed    Websites with free information:    [employer] patient website: www.voicesforpfd.org    Total Control Program: www.totalcontrolprogram.com    It was a pleasure meeting with you today.  Thank you for allowing me and my team the privilege of caring for you today.  YOU are the reason we are here, and I truly hope we provided you with the excellent service you deserve.  Please let us know if there is anything else we can do for you so that we can be sure you are leaving completely satisfied with your care experience.

## 2024-04-11 NOTE — NURSING NOTE
"Chief Complaint   Patient presents with    RECHECK     stress incontinence        Height 1.6 m (5' 3\"), weight 90.7 kg (200 lb), not currently breastfeeding. Body mass index is 35.43 kg/m .    Patient Active Problem List   Diagnosis    Polymyositis (H)    Intrinsic sphincter deficiency (ISD)    Incontinence of feces, unspecified fecal incontinence type    History of spinal cord injury    S/P spinal surgery    Neurogenic bladder       Allergies   Allergen Reactions    Potassium Shortness Of Breath and Palpitations     IV Potassium    Statins [Statins] Other (See Comments)     Statin induced myopathy    Penicillin G Swelling    Latex Hives     NO REACTION BUT SCREENING SCORE OF 6    Other Drug Allergy (See Comments) Muscle Pain (Myalgia) and Other (See Comments)     Statin induced myopathy       Current Outpatient Medications   Medication Sig Dispense Refill    albuterol (PROAIR HFA/PROVENTIL HFA/VENTOLIN HFA) 108 (90 Base) MCG/ACT inhaler Inhale 2 puffs into the lungs every 6 hours as needed for shortness of breath / dyspnea or wheezing      alendronate (FOSAMAX) 70 MG tablet Take 70 mg by mouth once a week Sundays      amLODIPine (NORVASC) 10 MG tablet Take 10 mg by mouth daily      aspirin (ASA) 81 MG chewable tablet Take 81 mg by mouth every evening      azaTHIOprine (IMURAN) 50 MG tablet Take 1 tablet (50 mg) by mouth every evening 30 tablet 3    Calcium-Vitamin D-Vitamin K 500-200-40 MG-UNT-MCG CHEW Take 1 chew tab by mouth daily 650 Ca - 500 D - 40 K      cholecalciferol 50 MCG (2000 UT) tablet Take 2,000 Units by mouth daily      DULoxetine (CYMBALTA) 60 MG capsule Take 60 mg by mouth daily      fluocinonide (LIDEX) 0.05 % external solution Apply topically daily as needed (scalp)      fluticasone (FLONASE) 50 MCG/ACT nasal spray Spray 1 spray in nostril daily as needed for rhinitis      fluticasone-salmeterol (ADVAIR) 250-50 MCG/DOSE inhaler Inhale 1 puff into the lungs 2 times daily      furosemide (LASIX) 20 " MG tablet Take 20 mg by mouth daily as needed (ankle swelling)      ipratropium - albuterol 0.5 mg/2.5 mg/3 mL (DUONEB) 0.5-2.5 (3) MG/3ML neb solution Inhale 3 mLs into the lungs every 4 hours as needed for shortness of breath      loratadine (CLARITIN) 10 MG tablet Take 10 mg by mouth daily      methocarbamol (ROBAXIN) 500 MG tablet Take 500 mg by mouth 3 times daily as needed for muscle spasms      multivitamin w/minerals (MULTI-VITAMIN) tablet Take 1 tablet by mouth daily Uses here and there      naproxen sodium (ANAPROX) 220 MG tablet Take 220 mg by mouth every morning      nitrous oxide/oxygen 50/50 blend 1 application. by inhalation route as directed. 3 lpm continuous      omeprazole (PRILOSEC) 40 MG DR capsule Take 40 mg by mouth daily      propranolol ER (INDERAL LA) 80 MG 24 hr capsule Take 80 mg by mouth every morning      zinc sulfate (ZINCATE) 220 (50 Zn) MG capsule Take 220 mg by mouth daily      acetaminophen (TYLENOL) 500 MG tablet Take 500-1,000 mg by mouth every 8 hours as needed (Patient not taking: Reported on 2024)      gabapentin (NEURONTIN) 300 MG capsule Take 300 mg by mouth 3 times daily (Patient not taking: Reported on 2024)      ibuprofen (ADVIL/MOTRIN) 600 MG tablet Take 600 mg by mouth every 8 hours as needed (Patient not taking: Reported on 2024)      predniSONE (DELTASONE) 20 MG tablet Take by mouth daily take 2 tablets by oral route daily for 3 days, then take 1 tablet oral route daily for 3 days, then take 1/2 tablet oral route daily for 4 days (Patient not taking: Reported on 2024)      primidone (MYSOLINE) 50 MG tablet Take 50 mg by mouth 2 times daily      propranolol (INDERAL) 10 MG tablet Take 10 mg by mouth three times daily as needed (tremor).         Social History     Tobacco Use    Smoking status: Former     Current packs/day: 0.00     Types: Cigarettes     Quit date:      Years since quittin.2     Passive exposure: Past    Smokeless tobacco: Never    Substance Use Topics    Alcohol use: Never    Drug use: Never       Jake Marin MA  4/11/2024  11:03 AM

## 2024-04-11 NOTE — PROGRESS NOTES
April 11, 2024    Virginia was seen today for recheck.    Diagnoses and all orders for this visit:    Urinary tract infection without hematuria, site unspecified    Urinary problem    Mixed incontinence    Intrinsic sphincter deficiency (ISD)    History of spinal cord injury    Bowel and bladder incontinence    S/P spinal surgery    Cauda equina compression (H)    Neurogenic bladder     74 year old female with NGB, hx of SCI, with florid BELKYS, with no sensation of voiding, and no detrusor function on UDS. She tried bulking agent, which did not improve her symptoms.  She has had a few culture proven UTI in the last few months and is very bothersome. We discussed about options to help her urinary symptoms.  For the BELKYS, we discussed PFPT which she has tried, repeating bulking agent, and more definitively more invasive options such as either AUS vs bladder neck closure with urinary diversion with SPT or catheterizable channel.  For recurrent infections, she will try some vaginal estrogen  She is not the most ideal surgical candidate with her PMH, being on home O2, and history of PE. She understands the risk, and would like to see Dr. Duarte to further discuss the options to see if diversion or maybe AUS may be helpful given her significant incontinence    Addendum:    The patient was seen and evaluated with the resident.  The plan was formulated in conjunction with me and I agree with the note with changes made as necessary.    She has terrible stress incontinence and no detrusor contraction, bulking did not help.  Although she is a higher risk surgical candidate some considerations would be urinar diversion, AUS or cath channel with BN closure.  I will refer her to my colleague Dr Duarte who does more of these procedures    20 minutes were spent today on the date of the encounter in reviewing the EMR including labs, direct patient care including, coordination of care, and documentation.    Ivonne Shaikh MD  "MPH  (she/her/hers)   of Urology  [facility]      Subjective    Her symptoms aren't better with the bulking agent, still leaking floridly.  Recent UTIs, last one was last month.  She denies any changes in health since last visit    Ht 1.6 m (5' 3\")   Wt 90.7 kg (200 lb)   BMI 35.43 kg/m    GENERAL: healthy, alert and no distress  EYES: Eyes grossly normal to inspection, conjunctivae and sclerae normal  HENT: normal cephalic/atraumatic.  External ears, nose and mouth without ulcers or lesions.  RESP: no audible wheeze, cough, or visible cyanosis.  No visible retractions or increased work of breathing.  Able to speak fully in complete sentences.  NEURO: Cranial nerves grossly intact, mentation intact and speech normal  PSYCH: mentation appears normal, affect normal/bright, judgement and insight intact, normal speech and appearance well-groomed    Labs/imaging/pathology  Recent Labs   Lab Test 04/01/24  1101 09/18/23  1302 03/20/23  1408   WBC 6.1 7.8 5.0   HGB 14.2 14.0 12.6   CR  --  0.56  --           CC  Patient Care Team:  Arsalan Blas MD as PCP - General  Keron Valencia MD as MD (Neurology)  Keron Valencia MD as Assigned Neuroscience Provider  Charlotte Chu PA-C as Physician Assistant (Urology)  Arlin Gutiérrez MD as MD (Physical Medicine and Rehabilitation)  Ana Lilia Byrne APRN CNP as Nurse Practitioner (Colon & Rectal)  Massiel Berrios PA-C as Physician Assistant  Ivonne Shaikh MD as MD (Urology)  Massiel Berrios PA-C as Assigned Surgical Provider  SELF, REFERRED      "

## 2024-04-19 ENCOUNTER — PRE VISIT (OUTPATIENT)
Dept: UROLOGY | Facility: CLINIC | Age: 74
End: 2024-04-19
Payer: COMMERCIAL

## 2024-04-19 NOTE — TELEPHONE ENCOUNTER
Reason for visit: Surgical consult     Relevant information: Mixed incontinence, history spinal cord injury, neurogenic     Records/imaging/labs/orders: orders     Pt called: No need for a call      Crystal Quiñones CMA  4/19/2024  7:22 AM

## 2024-04-30 NOTE — PROGRESS NOTES
HPI:  Jenna Jhaveri is a 74 year old female w/ NEFTALI, NGB 2/2 SCI being seen for incontinence.     Flaccid bladder and bowel after spine surgery. Severe incontinence.      Reviewed prior notes from Dr. Shaikh.     11/13/23 - UDS - max capacity 350 mL, no detrusor contraction, no sensation of voiding, good bladder compliance without DO. Multiple stress leaks at Pabd pressures ranging from 51-71 cm H2O starting at bladder volumes ~200 mL. Voids primarily by Valsalva effort     - failed PFPT, bulking agent  - on vaginal estrogen for Santiago  a  - other health condition: home O2 use, history of PE    Exam:  There were no vitals taken for this visit.  GENERAL: alert and no distress  EYES: Eyes grossly normal to inspection.  No discharge or erythema, or obvious scleral/conjunctival abnormalities.  RESP: No audible wheeze, cough, or visible cyanosis.    SKIN: Visible skin clear. No significant rash, abnormal pigmentation or lesions.  NEURO: Cranial nerves grossly intact.  Mentation and speech appropriate for age.  PSYCH: Appropriate affect, tone, and pace of words    Review of Imaging:  The following imaging exams were independently viewed and interpreted by me and discussed with patient:  Cystogram during UDS: Abnormal: smooth walled bladder. Open BN throughout study    Review of Labs:  The following labs were reviewed by me and discussed with the patient:  Recent Results (from the past 720 hour(s))   AST    Collection Time: 04/01/24 11:01 AM   Result Value Ref Range    AST 17 0 - 45 U/L   ALT    Collection Time: 04/01/24 11:01 AM   Result Value Ref Range    ALT 12 0 - 50 U/L   CK total    Collection Time: 04/01/24 11:01 AM   Result Value Ref Range    CK 30 26 - 192 U/L   CBC with platelets and differential    Collection Time: 04/01/24 11:01 AM   Result Value Ref Range    WBC Count 6.1 4.0 - 11.0 10e3/uL    RBC Count 4.88 3.80 - 5.20 10e6/uL    Hemoglobin 14.2 11.7 - 15.7 g/dL    Hematocrit 45.4 35.0 - 47.0 %    MCV 93 78 -  100 fL    MCH 29.1 26.5 - 33.0 pg    MCHC 31.3 (L) 31.5 - 36.5 g/dL    RDW 15.1 (H) 10.0 - 15.0 %    Platelet Count 295 150 - 450 10e3/uL    % Neutrophils 70 %    % Lymphocytes 15 %    % Monocytes 10 %    % Eosinophils 4 %    % Basophils 1 %    % Immature Granulocytes 0 %    NRBCs per 100 WBC 0 <1 /100    Absolute Neutrophils 4.3 1.6 - 8.3 10e3/uL    Absolute Lymphocytes 0.9 0.8 - 5.3 10e3/uL    Absolute Monocytes 0.6 0.0 - 1.3 10e3/uL    Absolute Eosinophils 0.2 0.0 - 0.7 10e3/uL    Absolute Basophils 0.0 0.0 - 0.2 10e3/uL    Absolute Immature Granulocytes 0.0 <=0.4 10e3/uL    Absolute NRBCs 0.0 10e3/uL     Cr 0.56, albumin 4.1 on 9/18/23    Assessment & Plan   Mixed urinary incontinence. Because of her combination of flaccid bladder and ISD, she would not be a good candidate for a sling because she would have worse troubles emptying  Given the combination of emptying problem and ISD and her home O2, I recommend the following algorithm:  Learn CIC. If successful, and if still leaking then add sling or even AUS but prefer sling given comorbidities  If cannot learn CIC then SPT insertion. If still leaking then can add sling or BN closure transvaginally.    3. Discussed the risk of these surgeries including retention and persistent incontinence.     =======================  Raissa Porter NP  [facility]     I acted as a scribe for this note. All portions of the documented history and physical were personally performed by Bigg Duarte MD as the attending physician.       All portions of the documented history and physical were personally performed by me as the attending physician. I have reviewed and edited the scribe's note as appropriate to reflect my personal interactions with the patient.    Bigg Duarte MD  [facility] [place]    ==========================      Additional Coding Information:    Problems:  4 -- two or more stable chronic  illnesses    Data Reviewed  2  Independent interpretation of a test performed by another physician/other qualified health care professional (not separately reported) - Cystogram    Level of risk:  4 -- minor surgery with patient or procedure risks

## 2024-05-06 ENCOUNTER — VIRTUAL VISIT (OUTPATIENT)
Dept: UROLOGY | Facility: CLINIC | Age: 74
End: 2024-05-06
Payer: COMMERCIAL

## 2024-05-06 DIAGNOSIS — N36.42 INTRINSIC SPHINCTER DEFICIENCY (ISD): Primary | ICD-10-CM

## 2024-05-06 DIAGNOSIS — R15.9 BOWEL AND BLADDER INCONTINENCE: ICD-10-CM

## 2024-05-06 DIAGNOSIS — Z87.828 HISTORY OF SPINAL CORD INJURY: ICD-10-CM

## 2024-05-06 DIAGNOSIS — R32 BOWEL AND BLADDER INCONTINENCE: ICD-10-CM

## 2024-05-06 PROCEDURE — 99214 OFFICE O/P EST MOD 30 MIN: CPT | Mod: 95 | Performed by: UROLOGY

## 2024-05-06 ASSESSMENT — PAIN SCALES - GENERAL: PAINLEVEL: NO PAIN (0)

## 2024-05-06 NOTE — PROGRESS NOTES
Virtual Visit Details    Type of service:  Video Visit   Video Start Time:  8:20  Video End Time:8:41 AM    Originating Location (pt. Location): Home    Distant Location (provider location):  Off-site  Platform used for Video Visit: [employer]

## 2024-05-06 NOTE — NURSING NOTE
Is the patient currently in the state of [place]? YES    Visit mode:VIDEO    If the visit is dropped, the patient can be reconnected by: VIDEO VISIT: Text to cell phone:   Telephone Information:   Mobile [phone]       Will anyone else be joining the visit? NO  (If patient encounters technical issues they should call [phone]    How would you like to obtain your AVS? MyChart    Are changes needed to the allergy or medication list? No    Are refills needed on medications prescribed by this physician? NO    Reason for visit: RECHECK    Vielka MILLER       No suspicious lesions

## 2024-05-06 NOTE — LETTER
5/6/2024       RE: Jenna Jhaveri  [address] [place] [id number]     Dear Colleague,    Thank you for referring your patient, Jenna Jhaveri, to the [facility] [place] [facility]. Please see a copy of my visit note below.    HPI:  Jenna Jhaveri is a 74 year old female w/ NEFTALI, NGB 2/2 SCI being seen for incontinence.     Flaccid bladder and bowel after spine surgery. Severe incontinence.      Reviewed prior notes from Dr. Shaikh.     11/13/23 - UDS - max capacity 350 mL, no detrusor contraction, no sensation of voiding, good bladder compliance without DO. Multiple stress leaks at Pabd pressures ranging from 51-71 cm H2O starting at bladder volumes ~200 mL. Voids primarily by Valsalva effort     - failed PFPT, bulking agent  - on vaginal estrogen for Santiago  a  - other health condition: home O2 use, history of PE    Exam:  There were no vitals taken for this visit.  GENERAL: alert and no distress  EYES: Eyes grossly normal to inspection.  No discharge or erythema, or obvious scleral/conjunctival abnormalities.  RESP: No audible wheeze, cough, or visible cyanosis.    SKIN: Visible skin clear. No significant rash, abnormal pigmentation or lesions.  NEURO: Cranial nerves grossly intact.  Mentation and speech appropriate for age.  PSYCH: Appropriate affect, tone, and pace of words    Review of Imaging:  The following imaging exams were independently viewed and interpreted by me and discussed with patient:  Cystogram during UDS: Abnormal: smooth walled bladder. Open BN throughout study    Review of Labs:  The following labs were reviewed by me and discussed with the patient:  Recent Results (from the past 720 hour(s))   AST    Collection Time: 04/01/24 11:01 AM   Result Value Ref Range    AST 17 0 - 45 U/L   ALT    Collection Time: 04/01/24 11:01 AM   Result Value Ref Range    ALT 12 0 - 50 U/L   CK total    Collection Time: 04/01/24  11:01 AM   Result Value Ref Range    CK 30 26 - 192 U/L   CBC with platelets and differential    Collection Time: 04/01/24 11:01 AM   Result Value Ref Range    WBC Count 6.1 4.0 - 11.0 10e3/uL    RBC Count 4.88 3.80 - 5.20 10e6/uL    Hemoglobin 14.2 11.7 - 15.7 g/dL    Hematocrit 45.4 35.0 - 47.0 %    MCV 93 78 - 100 fL    MCH 29.1 26.5 - 33.0 pg    MCHC 31.3 (L) 31.5 - 36.5 g/dL    RDW 15.1 (H) 10.0 - 15.0 %    Platelet Count 295 150 - 450 10e3/uL    % Neutrophils 70 %    % Lymphocytes 15 %    % Monocytes 10 %    % Eosinophils 4 %    % Basophils 1 %    % Immature Granulocytes 0 %    NRBCs per 100 WBC 0 <1 /100    Absolute Neutrophils 4.3 1.6 - 8.3 10e3/uL    Absolute Lymphocytes 0.9 0.8 - 5.3 10e3/uL    Absolute Monocytes 0.6 0.0 - 1.3 10e3/uL    Absolute Eosinophils 0.2 0.0 - 0.7 10e3/uL    Absolute Basophils 0.0 0.0 - 0.2 10e3/uL    Absolute Immature Granulocytes 0.0 <=0.4 10e3/uL    Absolute NRBCs 0.0 10e3/uL     Cr 0.56, albumin 4.1 on 9/18/23    Assessment & Plan  Mixed urinary incontinence. Because of her combination of flaccid bladder and ISD, she would not be a good candidate for a sling because she would have worse troubles emptying  Given the combination of emptying problem and ISD and her home O2, I recommend the following algorithm:  Learn CIC. If successful, and if still leaking then add sling or even AUS but prefer sling given comorbidities  If cannot learn CIC then SPT insertion. If still leaking then can add sling or BN closure transvaginally.    3. Discussed the risk of these surgeries including retention and persistent incontinence.     =======================  Raissa Porter, KIERSTEN  [facility]     I acted as a scribe for this note. All portions of the documented history and physical were personally performed by Bigg Duarte MD as the attending physician.       All portions of the documented history and physical were personally performed by me as the attending  physician. I have reviewed and edited the scribe's note as appropriate to reflect my personal interactions with the patient.    Bigg Duarte MD  [facility] [place]    ==========================      Additional Coding Information:    Problems:  4 -- two or more stable chronic illnesses    Data Reviewed  2  Independent interpretation of a test performed by another physician/other qualified health care professional (not separately reported) - Cystogram    Level of risk:  4 -- minor surgery with patient or procedure risks        Virtual Visit Details    Type of service:  Video Visit   Video Start Time:  8:20  Video End Time:8:41 AM    Originating Location (pt. Location): Home    Distant Location (provider location):  Off-site  Platform used for Video Visit: [employer]

## 2024-05-07 ENCOUNTER — TELEPHONE (OUTPATIENT)
Dept: UROLOGY | Facility: CLINIC | Age: 74
End: 2024-05-07
Payer: COMMERCIAL

## 2024-05-07 NOTE — TELEPHONE ENCOUNTER
Received message pt needs CIC teaching.  Called and offered her several appointments this week.  Pt prefers appointment later in May.  Scheduled pt nurse visit for 5/29 at 11am.    I confirmed with pt she has a way to void, pt states she dribbles into a brief.  No concerns per pt regarding retention.    Kiara Karimi RN

## 2024-05-29 ENCOUNTER — ALLIED HEALTH/NURSE VISIT (OUTPATIENT)
Dept: NURSING | Facility: CLINIC | Age: 74
End: 2024-05-29
Payer: COMMERCIAL

## 2024-05-29 ENCOUNTER — TELEPHONE (OUTPATIENT)
Dept: UROLOGY | Facility: CLINIC | Age: 74
End: 2024-05-29

## 2024-05-29 DIAGNOSIS — R82.90 CLOUDY URINE: Primary | ICD-10-CM

## 2024-05-29 DIAGNOSIS — N39.0 UTI (URINARY TRACT INFECTION): Primary | ICD-10-CM

## 2024-05-29 LAB
ALBUMIN UR-MCNC: NEGATIVE MG/DL
APPEARANCE UR: ABNORMAL
BACTERIA #/AREA URNS HPF: ABNORMAL /HPF
BILIRUB UR QL STRIP: NEGATIVE
COLOR UR AUTO: YELLOW
GLUCOSE UR STRIP-MCNC: NEGATIVE MG/DL
HGB UR QL STRIP: NEGATIVE
KETONES UR STRIP-MCNC: NEGATIVE MG/DL
LEUKOCYTE ESTERASE UR QL STRIP: ABNORMAL
NITRATE UR QL: POSITIVE
PH UR STRIP: 6 [PH] (ref 5–7)
RBC #/AREA URNS AUTO: ABNORMAL /HPF
SKIP: ABNORMAL
SP GR UR STRIP: 1.02 (ref 1–1.03)
UROBILINOGEN UR STRIP-MCNC: NORMAL MG/DL
WBC #/AREA URNS AUTO: ABNORMAL /HPF

## 2024-05-29 PROCEDURE — 87186 SC STD MICRODIL/AGAR DIL: CPT

## 2024-05-29 PROCEDURE — 99211 OFF/OP EST MAY X REQ PHY/QHP: CPT

## 2024-05-29 PROCEDURE — 87088 URINE BACTERIA CULTURE: CPT

## 2024-05-29 PROCEDURE — 87086 URINE CULTURE/COLONY COUNT: CPT

## 2024-05-29 PROCEDURE — 81001 URINALYSIS AUTO W/SCOPE: CPT

## 2024-05-29 NOTE — NURSING NOTE
"Jenna Jhaveri comes into clinic today at the request of Dr. Duarte Ordering Provider for Pt Teaching for self-catheterization for diagnosis of intrinsic sphincter deficiency and urinary incontinence.    Patient reports that she has no sensation of her pelvic area and stated, \"I never know when I have an infection. My urine is really dark and cloudy. Can we check a urine sample to make sure I don't have an infection.\" Informed patient that we will attempt to obtain a sample from the catheter during CIC teaching. Patient was comfortable with plan.     Per Dr. Duarte, patient to be taught CIC with use of 14 [place]ish catheter for diagnosis of intrinsic sphincter deficiency and urinary incontinence. With written and verbal instructions, patient was educated on clean intermittent catheterization. After multiple attempts, patient successfully completed CIC. Per patient, she would like to practice more at home. Writer to follow up with patient on Friday 5/31/24 to see how self-catheterization is going. Informed patient to call the clinic with any questions or concerns in the meantime.      Patient successfully placed 14 [place]bourgish intermittent catheter into the urethral meatus   Urine is dark yellow in color and cloudy  50 cc's of urine output returned.  Urine sample obtained and sent to the lab, results in process.  Patient aware that the UC results can take up to 2 days for results and will plan to discuss results on Friday 5/31/24. Informed patient to call with any questions or concerns in the meantime.    This service provided today was under the supervising provider of the day Dr. Finnegan, who was available if needed.    Flor Ge RN, BSN        "

## 2024-05-29 NOTE — TELEPHONE ENCOUNTER
Patient in clinic today for nurse visit for CIC teaching per Dr. Duarte. Patient will be practicing CIC at home a few more times and requested for writer to follow up with her on Friday 5/31/24.     UA/UC obtained during nurse visit as well per patient request for cloudy dark urine. Per patient, she doesn't have feeling of her pelvic area so never really knows when she has a UTI. Will watch for results and discuss with patient on 5/31/24 as well. Per patient, if an antibiotic is needed, her preferred pharmacy is [facility] on [address] in [facility]. Patient aware to call with any questions or concerns in the meantime.    Flor Ge RN, BSN

## 2024-05-29 NOTE — Clinical Note
SUJATHA Vaca Dr. Patient was taught CIC here at [place]. Patient was successful, but would like to practice more at home. If patient proceeds with CIC at home, how many times per day would you like patient to complete CIC?  Thank you,  Flor Ge RN, BSN

## 2024-05-30 LAB — BACTERIA UR CULT: ABNORMAL

## 2024-05-31 RX ORDER — SULFAMETHOXAZOLE/TRIMETHOPRIM 800-160 MG
1 TABLET ORAL 2 TIMES DAILY
Qty: 10 TABLET | Refills: 0 | Status: SHIPPED | OUTPATIENT
Start: 2024-05-31 | End: 2024-06-05

## 2024-05-31 NOTE — TELEPHONE ENCOUNTER
5/29/24 final  results available. Message sent to Charlotte Chu PA-C with request to help review and advise on results.    Flor Ge RN, BSN

## 2024-05-31 NOTE — TELEPHONE ENCOUNTER
Charlotte Chu PA-C Berkenes, Melissa, RN; [facility] Adult [place]  Caller: Unspecified (2 days ago, 11:56 AM)  Girish Ray,  Let's send Bactrim DS BID x 5 days.  Thanks!  Charlotte Chu PA-C     Received the above message from Charlotte Chu PA-C.     Called and spoke to patient who is aware of the 5/29/24 urine culture results and the above recommendations. Patient verbalized understanding. Bactrim DS ordered per Charlotte Chu PA-C and sent to [facility] in [place] per patient request.    Discussed how CIC was going for patient. Per patient, she is struggling with it but would like to continue to try. Per patient, out of 5 times of catheterizing on the toilet, she was only successful once. Patient reports that she was successful 3/5 times when lying down. Informed patient that writer will send a message with request for Dr. Duarte to review and advise on the frequency of self-catheterization and then update her with additional information. Patient reports that she will likely need supplies ordered soon and likes the glide catheters the best. Will continue to follow up with patient.     Flor Ge RN, BSN

## 2024-06-03 NOTE — TELEPHONE ENCOUNTER
Bigg Duarte MD Berkenes, Melissa, RN; Brigette Gaviria RN  Good.  Lets do 4x/d and keep a diary of volumes and any incontinence.Brigette you can touch base with her in a month and if she is still leaking then VV with me to discuss sling. But if not leaking then can f/u prn or with Dr Shaikh.  Thanks     Received the above message from Dr. Duarte. Called and spoke to patient who is aware of the above information. Patient verbalized understanding. Informed patient that a message will be sent to Dr. Duarte's nurse with request to assist in placing the order for catheter supplies to [facility]. Patient aware that she will be getting a call from [address] [place] [address] soon to discuss the supplies. Patient was comfortable with plan.    Flor Ge RN, BSN

## 2024-06-12 DIAGNOSIS — R33.9 URINARY RETENTION: Primary | ICD-10-CM

## 2024-06-20 ENCOUNTER — PRE VISIT (OUTPATIENT)
Dept: SURGERY | Facility: CLINIC | Age: 74
End: 2024-06-20

## 2024-06-25 ENCOUNTER — DOCUMENTATION ONLY (OUTPATIENT)
Dept: UROLOGY | Facility: CLINIC | Age: 74
End: 2024-06-25
Payer: COMMERCIAL

## 2024-06-25 NOTE — PROGRESS NOTES
Patient to CIC 4x/day due to indefinite urinary incontinence.  4x per day.      Brigette Gaviria RN

## 2024-06-26 ENCOUNTER — TELEPHONE (OUTPATIENT)
Dept: UROLOGY | Facility: CLINIC | Age: 74
End: 2024-06-26
Payer: COMMERCIAL

## 2024-06-26 NOTE — TELEPHONE ENCOUNTER
This writer called Virginia to see how CIC is going.  Left detailed VM for her to call this writer back.  If she is experiencing leaking she should have follow up with Dr. Duarte, if she is not she should see Dr. Shaikh.     Brigette Gaviria RN

## 2024-07-01 NOTE — PROGRESS NOTES
DME order changed to reflect Dr. Duarte's note regarding 4x daily CIC.      Brigette Gaviria RN

## 2024-07-15 ENCOUNTER — DOCUMENTATION ONLY (OUTPATIENT)
Dept: UROLOGY | Facility: CLINIC | Age: 74
End: 2024-07-15
Payer: COMMERCIAL

## 2024-07-15 NOTE — PROGRESS NOTES
Type of Form Received: Order (catheter, bedside/urinary drainage bags)    Form Received (Date) 7/15/24   Form Filled out Yes, date 7/15/24   Placed in provider folder Yes       Received Completed forms Yes   Faxed Forms Faxed To: [address] [place] [address]  Fax Number: [phone]   Sent to HIM (Date) 7/15/24

## 2024-07-26 NOTE — TELEPHONE ENCOUNTER
Diagnosis, Referred by & from: Discuss Ostomy   Appt date: 10/24/2024   NOTES STATUS DETAILS   OFFICE NOTE from referring provider [facility]:  11/16/23 - CR OV with Ana Lilia Byrne NP   OFFICE NOTE from other specialist Care Everywhere / [facility]:  ([facility]) 10/24/24 - WOUND OV with Keila Joiner RN  5/6/24 - URO OV with Dr. Duarte  4/11/24 - URO OV with Dr. Shaikh     ealth - :  ([facility]) 9/16/24 - URO OV with DIMAS Lopez - [place]:  11/13/23, 8/22/23 - URO OV with DIMAS Lopez  8/30/23, 4/25/23 - URO OV with Dr. Hawa Staley - [facility]:  8/16/23 - PT OV with Jessica Stover PT     [facility]:  8/9/22, 10/27/21 - URO OV with Dr. Donahue  11/17/21 - PCC OV with Lacey Boyce NP   DISCHARGE SUMMARY from hospital Care Everywhere [facility]:  2/14/23 - Admission with Dr. Saavedra  5/17/22 - Admission with Dr. Saavedra     [facility]:  6/29/22 - Admission with Dr. Smith   DISCHARGE REPORT from the ER N/A    OPERATIVE REPORT [facility]:  10/9/23 - OP Note for CYSTOSCOPY WITH PERIURETHRAL BULKING AGENT INJECTION with Dr. Shaikh   MEDICATION LIST Internal    LABS     ANAL PAP/CEA N/A    BIOPSIES/PATHOLOGY RELATED TO DIAGNOSIS Care Everywhere [facility]:  12/19/18 - Colon Biopsy (Case: [id number])   DIAGNOSTIC PROCEDURES     PFC TESTING (from the Pelvic floor center includes Manometry, PDNL, EMG, etc.) N/A    COLONOSCOPY (most recent all time after 5 years) Care Everywhere [facility]:  12/19/18 - Colonoscopy   FLEX SIGMOIDOSCOPY N/A    UPPER ENDOSCOPY (EGD) N/A    ERCP N/A    IMAGING (DISC & REPORT)      CT Received [facility]:  3/9/22 - CT Abd/Pelvis  10/16/20 - CT Abd/Pelvis   MRI N/A    XRAY Received [facility]:  7/4/22 - XR Abdomen   ULTRASOUND  (ENDOANAL/ENDORECTAL) N/A

## 2024-08-21 ENCOUNTER — PRE VISIT (OUTPATIENT)
Dept: UROLOGY | Facility: CLINIC | Age: 74
End: 2024-08-21
Payer: COMMERCIAL

## 2024-08-21 NOTE — TELEPHONE ENCOUNTER
Reason for visit: Discuss possible SPT     Relevant information: Hx of urinary retention, ISD, spinal cord injury, UTI, bowel and bladder incontinence.    Records/imaging/labs/orders: All records available    Pt called: No need for a call    At Rooming: Standard procedure    Tammy Baltazar  8/21/2024  4:37 PM

## 2024-09-04 NOTE — PROGRESS NOTES
Virtual Visit Details    Type of service:  Video Visit   Video Start Time: 1209  Video End Time:12:25 PM    Originating Location (pt. 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      Medsurg history  11/13/23 - UDS - max capacity 350 mL, no detrusor contraction, no sensation of voiding, good bladder compliance without DO. Multiple stress leaks at Pabd pressures ranging from 51-71 cm H2O starting at bladder volumes ~200 mL. Voids primarily by Valsalva effort.  .  5/6/24 - seen by Gerald, she would not be a good candidate for sling due to flaccid bladder and ISD. Recommended CIC + potential sling / AUS if still leaking versus SPT + potential sling / BN closure transvaginally    Today  - She struggles with CIC, 1 in 6 tries correct.  - Main problem is that she is unable to find urethra. Sometimes it takes so long that she leaks before catheter could get in. She also finds it challenging to collect the urine from the catheter.    - She stopped attempting CIC after most recent UTI  - she is done with antibiotics last week  - voids by leaking urine  - UTI symptoms include different smell, fatigue, and night sweats  - UTI x3 in the past year      Exam:  There were no vitals taken for this visit.  GENERAL: alert and no distress  EYES: Eyes grossly normal to inspection.  No discharge or erythema, or obvious scleral/conjunctival abnormalities.  RESP: No audible wheeze, cough, or visible cyanosis.    SKIN: Visible skin clear. No significant rash, abnormal pigmentation or lesions.  NEURO: Cranial nerves grossly intact.  Mentation and speech appropriate for age.  PSYCH: Appropriate affect, tone, and pace of words    Review of Imaging:  The following imaging exams were independently viewed and interpreted by me and discussed with patient:  none  Review of  Labs:  The following labs were reviewed by me and discussed with the patient:  No results found for this or any previous visit (from the past 720 hour(s)).      Assessment & Plan   NGB 2/2 SCI   NEFTALI    - We went through tips for CIC, including placing a mirror in front of her when reaching for the urethra.  It is okay to not measure the urine output if that makes CIC easier.  - We discussed whether SPT is and how long the surgery takes.  We discussed that SPT is associated with the higher UTI risk compared to CIC.  But not doing CIC put her at a even higher risk for UTI compared to SPT because her bladder is not emptied all the way.  Patient would like to try CIC again.  - She has appointment scheduled with BIRD Lopez on 9/16/2024      Helene Porter NP  [facility] [place]    ==========================      Additional Coding Information:    Problems:  4 -- two or more stable chronic illnesses    Level of risk:  4 -- minor surgery with patient or procedure risks

## 2024-09-05 ENCOUNTER — VIRTUAL VISIT (OUTPATIENT)
Dept: UROLOGY | Facility: CLINIC | Age: 74
End: 2024-09-05
Payer: COMMERCIAL

## 2024-09-05 DIAGNOSIS — N31.9 NEUROGENIC BLADDER: Primary | ICD-10-CM

## 2024-09-05 DIAGNOSIS — N39.46 MIXED INCONTINENCE: ICD-10-CM

## 2024-09-05 PROCEDURE — 99214 OFFICE O/P EST MOD 30 MIN: CPT | Mod: 95

## 2024-09-05 ASSESSMENT — PAIN SCALES - GENERAL: PAINLEVEL: NO PAIN (0)

## 2024-09-05 NOTE — NURSING NOTE
Current patient location: [address] [place] [address]    Is the patient currently in the state of [place]? YES    Visit mode:VIDEO    If the visit is dropped, the patient can be reconnected by: VIDEO VISIT: Text to cell phone:   Telephone Information:   Mobile [phone]    and VIDEO VISIT: Send to e-mail at: [email or web address]    Will anyone else be joining the visit? NO  (If patient encounters technical issues they should call [phone])    How would you like to obtain your AVS? MyChart    Are changes needed to the allergy or medication list? No    Are refills needed on medications prescribed by this physician? NO    Rooming Documentation:  Questionnaire(s) not pre-assigned      Reason for visit: RECHECK    Isabel MILLER

## 2024-09-05 NOTE — LETTER
9/5/2024       RE: Jenna Jhaveri  [address] [facility] [address]     Dear Colleague,    Thank you for referring your patient, Jenna hJaveri, to the [facility]OLOG[facility] [place] at [facility]. Please see a copy of my visit note below.    Virtual Visit Details    Type of service:  Video Visit   Video Start Time: 1209  Video End Time:12:25 PM    Originating Location (pt. 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      Medsurg history  11/13/23 - UDS - max capacity 350 mL, no detrusor contraction, no sensation of voiding, good bladder compliance without DO. Multiple stress leaks at Pabd pressures ranging from 51-71 cm H2O starting at bladder volumes ~200 mL. Voids primarily by Valsalva effort.  .  5/6/24 - seen by Gerald, she would not be a good candidate for sling due to flaccid bladder and ISD. Recommended CIC + potential sling / AUS if still leaking versus SPT + potential sling / BN closure transvaginally    Today  - She struggles with CIC, 1 in 6 tries correct.  - Main problem is that she is unable to find urethra. Sometimes it takes so long that she leaks before catheter could get in. She also finds it challenging to collect the urine from the catheter.    - She stopped attempting CIC after most recent UTI  - she is done with antibiotics last week  - voids by leaking urine  - UTI symptoms include different smell, fatigue, and night sweats  - UTI x3 in the past year      Exam:  There were no vitals taken for this visit.  GENERAL: alert and no distress  EYES: Eyes grossly normal to inspection.  No discharge or erythema, or obvious scleral/conjunctival abnormalities.  RESP: No audible wheeze, cough, or visible cyanosis.    SKIN: Visible skin clear. No significant  rash, abnormal pigmentation or lesions.  NEURO: Cranial nerves grossly intact.  Mentation and speech appropriate for age.  PSYCH: Appropriate affect, tone, and pace of words    Review of Imaging:  The following imaging exams were independently viewed and interpreted by me and discussed with patient:  none  Review of Labs:  The following labs were reviewed by me and discussed with the patient:  No results found for this or any previous visit (from the past 720 hour(s)).      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  It is okay to not measure the urine output if that makes CIC easier.  - We discussed whether SPT is and how long the surgery takes.  We discussed that SPT is associated with the higher UTI risk compared to CIC.  But not doing CIC put her at a even higher risk for UTI compared to SPT because her bladder is not emptied all the way.  Patient would like to try CIC again.  - She has appointment scheduled with BIRD Lopez on 9/16/2024      Helene Porter NP  [facility] UROLOGY [facility] [place]    ==========================      Additional Coding Information:    Problems:  4 -- two or more stable chronic illnesses    Level of risk:  4 -- minor surgery with patient or procedure risks                Again, thank you for allowing me to participate in the care of your patient.      Sincerely,    Helene Porter NP

## 2024-09-18 ENCOUNTER — TRANSCRIBE ORDERS (OUTPATIENT)
Dept: OTHER | Age: 74
End: 2024-09-18

## 2024-09-18 DIAGNOSIS — M85.89 OSTEOPENIA OF MULTIPLE SITES: Primary | ICD-10-CM

## 2024-10-24 ENCOUNTER — PRE VISIT (OUTPATIENT)
Dept: SURGERY | Facility: CLINIC | Age: 74
End: 2024-10-24

## 2024-11-26 ENCOUNTER — DOCUMENTATION ONLY (OUTPATIENT)
Dept: UROLOGY | Facility: CLINIC | Age: 74
End: 2024-11-26
Payer: COMMERCIAL

## 2025-02-03 ENCOUNTER — VIRTUAL VISIT (OUTPATIENT)
Dept: UROLOGY | Facility: CLINIC | Age: 75
End: 2025-02-03
Payer: COMMERCIAL

## 2025-02-03 DIAGNOSIS — N39.46 MIXED INCONTINENCE: ICD-10-CM

## 2025-02-03 DIAGNOSIS — Z87.828 HISTORY OF SPINAL CORD INJURY: ICD-10-CM

## 2025-02-03 DIAGNOSIS — N39.0 RECURRENT UTI: ICD-10-CM

## 2025-02-03 DIAGNOSIS — N31.9 NEUROGENIC BLADDER: Primary | ICD-10-CM

## 2025-02-03 DIAGNOSIS — N36.42 INTRINSIC SPHINCTER DEFICIENCY (ISD): ICD-10-CM

## 2025-02-03 RX ORDER — AZITHROMYCIN 250 MG/1
TABLET, FILM COATED ORAL
COMMUNITY
Start: 2025-01-02

## 2025-02-03 RX ORDER — CIPROFLOXACIN 500 MG/1
1 TABLET, FILM COATED ORAL
COMMUNITY
Start: 2025-01-29

## 2025-02-03 NOTE — PATIENT INSTRUCTIONS
UTI Prevention:    - D-mannose 2gm daily.   - Recommend cranberry supplement 1gm twice-daily or Vitamin C 1 gm twice daily.   - Probiotic daily; recommend Florajen 3 or any women's probiotic will do.  - Make sure you stay hydrated with about 60-80oz of water per day.   - Recommend good vulvar hygiene such as wearing loose cotton underwear and avoiding scented hygenic products/wipes/soaps or detergents. Wipe front to back after voiding/defecation. Avoid sitting in soiled clothing and keeping vulva dry.   - If you develop symptoms of UTI, please contact clinic. However, if you develop fevers  greater than 100.4 degrees fahrenheit, flank pain or blood in your urine, recommend going to urgent care or ER.   - Make sure to drink plenty of water each day and to really push fluids when have symptoms of a UTI.  - Estrogen cream 3x per week at night; apply blueberry-sized amount on finger and rub along vaginal tissue.   _______________________________________________

## 2025-02-03 NOTE — PROGRESS NOTES
Video-Visit Details    Type of service:  Video Visit    Video Start Time: 10:39 AM    Video End Time:10:55 AM    Originating Location (pt. Location): Home    Distant Location (provider location): onsite    Platform used for Video Visit: [facility]    Urology Clinic    Name: Jenna Jhaveri    MRN: [id number]   YOB: 1950  Accompanied at today's visit by:self              Assessment and Plan:   75 year old female with NGB, hx of SCI, with florid BELKYS, with no sensation of voiding, and no detrusor function on UDS. She tried bulking agent, which did not improve her symptoms.     - Patient interested in SPT placement. Will reach out to Dr. Duarte.   - educated patient about UTI prevention. Encourage increasing estrogen cream to 3x/week.  - start D-mannose  - continue vitamin C.   - prefer cranberry supplement and not cranberry juice.   - discussed possible abx for prophylaxis. Discussed risks/benefits and risks of resistance. Discussed how could be colonized as well given her retention. Patient would like to hold off on prophylaxis with antibiotics at this time. Would continue to monitor after has SPT placement.   - After discussing the assessment and plan with patient, patient verbalized understanding and agreed to the above plan. All questions answered.     28 minutes were spent today on the date of the encounter in reviewing the EMR, direct patient care, coordination of care and documentation in addition to review of Dr. Shaikh's note, review of Dr. Duarte's note, review of Raissa's note, review of labs.     Massiel Berrios PA-C  February 3, 2025    Patient Care Team:  Arsalan Blas MD as PCP - General  Keron Valencia MD as MD (Neurology)  Keron Valencia MD as Assigned Neuroscience Provider  Charlotte Chu PA-C as Physician Assistant (Urology)  Arlin Gutiérrez MD as MD (Physical Medicine and Rehabilitation)  Ana Lilia Byrne APRN CNP as Nurse  Practitioner (Colon & Rectal)  Massiel Berrios PA-C as Physician Assistant  Brandy Shaikh MD as MD (Urology)  Og Lion MD as Surgeon (Surgery)  Massiel Berrios PA-C as Physician Assistant  Ginny Francis MD as MD (Endocrinology, Diabetes, and Metabolism)  Brandy Shaikh MD as Assigned Surgical Provider  BRANDY SHAIKH          Chief Complaint:   Follow-up          History of Present Illness:   February 3, 2025    HISTORY: Jenna Jhaveri is a 75 year old female is here for follow-up. We have been following her for NGB, hx of SCI, with florid BELKYS, with no sensation of voiding, and no detrusor function on UDS. She tried bulking agent, which did not improve her symptoms. Last seen by DR. Shaikh on 4/11/24 and discussed PFPT (which she has tried), repeating bulking agent, or more invasive options such as AUS vs bladder neck closure with urinary diversion with SPT or catheterize channel. She is not the most ideal surgical candidate given her complicated hx of betty O2 and hx of PE. Was also placed on estrogen cream for UTIs. She then saw Dr. Duarte on 5/6/24 to discuss possible surgical options. Options were to learn CIC and if cannot learn CIC then SPT insertion. And if still leaking then can add sling or BN closure transvaginally. She was having trouble learning CIC and ended up stopping CIC. She was then seen by Helene BALL on 9/5/24 and discussed risks/benefits of CIC vs SPT placement. Also discussed the risks of Santiago if continued to retain urine and did not perform CIC or have SPT placed. Patient has not been seen since that time and has continued to struggle with UTIs. Not cathing as too difficult for her. Using Estrogen 2x/week. Drinks cranberry juice. Takes vitamin C. Is interested in moving forward with SPT placement. Of note she states she is also working with colorectal and may have colostomy. Patient voices no other concerns at this time.            Past Medical  History:   No past medical history on file.         Past Surgical History:     Past Surgical History:   Procedure Laterality Date    CYSTOSCOPY, INJECT COLLAGEN, COMBINED N/A 10/9/2023    Procedure: CYSTOSCOPY, WITH PERIURETHRAL BULKING AGENT INJECTION;  Surgeon: Ivonne Shaikh MD;  Location: [facility] OR    ORTHOPEDIC SURGERY      multiple back procedures, knee surgery, carpal tunnel            Social History:     Social History     Tobacco Use    Smoking status: Former     Current packs/day: 0.00     Types: Cigarettes     Quit date:      Years since quittin.1     Passive exposure: Past    Smokeless tobacco: Never   Substance Use Topics    Alcohol use: Never            Family History:     Family History   Problem Relation Age of Onset    Anesthesia Reaction No family hx of     Deep Vein Thrombosis (DVT) No family hx of               Allergies:     Allergies   Allergen Reactions    Potassium Shortness Of Breath and Palpitations     IV Potassium    Statins [Statins] Other (See Comments)     Statin induced myopathy    Penicillin G Swelling    Latex Hives     NO REACTION BUT SCREENING SCORE OF 6    Other Drug Allergy (See Comments) Muscle Pain (Myalgia) and Other (See Comments)     Statin induced myopathy            Medications:     Current Outpatient Medications   Medication Sig Dispense Refill    alendronate (FOSAMAX) 70 MG tablet Take 70 mg by mouth once a week Sundays      amLODIPine (NORVASC) 10 MG tablet Take 10 mg by mouth daily      aspirin (ASA) 81 MG chewable tablet Take 81 mg by mouth every evening      azithromycin (ZITHROMAX) 250 MG tablet TAKE 2 TABLETS BY MOUTH ON DAY 1, AND THEN TAKE 1 TABLET BY MOUTH ONCE A DAY ON DAY 2 THROUGH DAY 5      Calcium-Vitamin D-Vitamin K 500-200-40 MG-UNT-MCG CHEW Take 1 chew tab by mouth daily 650 Ca - 500 D - 40 K      cholecalciferol 50 MCG ( UT) tablet Take 2,000 Units by mouth daily      ciprofloxacin (CIPRO) 500 MG tablet Take 1 tablet by mouth 2 times  daily.      DULoxetine (CYMBALTA) 60 MG capsule Take 60 mg by mouth daily      estradiol (ESTRACE) 0.1 MG/GM vaginal cream Place 1 g vaginally three times a week 42.5 g 11    fluocinonide (LIDEX) 0.05 % external solution Apply topically daily as needed (scalp)      fluticasone (FLONASE) 50 MCG/ACT nasal spray Spray 1 spray in nostril daily as needed for rhinitis      fluticasone-salmeterol (ADVAIR) 250-50 MCG/DOSE inhaler Inhale 1 puff into the lungs 2 times daily      furosemide (LASIX) 20 MG tablet Take 20 mg by mouth daily as needed (ankle swelling)      ipratropium - albuterol 0.5 mg/2.5 mg/3 mL (DUONEB) 0.5-2.5 (3) MG/3ML neb solution Inhale 3 mLs into the lungs every 4 hours as needed for shortness of breath      loratadine (CLARITIN) 10 MG tablet Take 10 mg by mouth daily      methocarbamol (ROBAXIN) 500 MG tablet Take 500 mg by mouth 3 times daily as needed for muscle spasms      multivitamin w/minerals (MULTI-VITAMIN) tablet Take 1 tablet by mouth daily Uses here and there      naproxen sodium (ANAPROX) 220 MG tablet Take 220 mg by mouth every morning      nitrous oxide/oxygen 50/50 blend 1 application. by inhalation route as directed. 3 lpm continuous      omeprazole (PRILOSEC) 40 MG DR capsule Take 40 mg by mouth daily      propranolol ER (INDERAL LA) 80 MG 24 hr capsule Take 80 mg by mouth every morning      zinc sulfate (ZINCATE) 220 (50 Zn) MG capsule Take 220 mg by mouth daily      acetaminophen (TYLENOL) 500 MG tablet Take 500-1,000 mg by mouth every 8 hours as needed (Patient not taking: Reported on 4/1/2024)      albuterol (PROAIR HFA/PROVENTIL HFA/VENTOLIN HFA) 108 (90 Base) MCG/ACT inhaler Inhale 2 puffs into the lungs every 6 hours as needed for shortness of breath / dyspnea or wheezing      azaTHIOprine (IMURAN) 50 MG tablet Take 1 tablet (50 mg) by mouth every evening 30 tablet 3    gabapentin (NEURONTIN) 300 MG capsule Take 300 mg by mouth 3 times daily (Patient not taking: Reported on  4/1/2024)      ibuprofen (ADVIL/MOTRIN) 600 MG tablet Take 600 mg by mouth every 8 hours as needed (Patient not taking: Reported on 4/1/2024)      predniSONE (DELTASONE) 20 MG tablet Take by mouth daily take 2 tablets by oral route daily for 3 days, then take 1 tablet oral route daily for 3 days, then take 1/2 tablet oral route daily for 4 days (Patient not taking: Reported on 2/3/2025)      primidone (MYSOLINE) 50 MG tablet Take 50 mg by mouth 2 times daily      propranolol (INDERAL) 10 MG tablet Take 10 mg by mouth three times daily as needed (tremor).       No current facility-administered medications for this visit.             Review of Systems:    ROS: 14 point ROS neg other than the symptoms noted above in the HPI.          Physical Exam:   not currently breastfeeding.  Data Unavailable, There is no height or weight on file to calculate BMI., 0 lbs 0 oz  Gen appearance: Age-appropriate appearing female in NAD.   HEENT:  EOMI, conjunctiva clear/white. Normal ROM of neck for age.   Psych:  alert , In no acute distress.  Neuro:  A&Ox3  Resp:  Normal respiratory effort; not in acute respiratory distress.          Data:    Labs:    Creatinine   Date Value Ref Range Status   09/18/2023 0.56 0.51 - 0.95 mg/dL Final     UC  12/6/24 25-50 mixed jenae  11/6/24 >100k Klebsiella pneumoniae (resistant to ampicillin and macrobid)  8/14/24 >100k Klebsiella pneumoniae (resistant to ampicillin and intermediate to macrobid)  5/29/24  >100k Klebsiella poneumoniae (resistant to ampicillin and intermediate to macrdobid)

## 2025-02-17 ENCOUNTER — MYC MEDICAL ADVICE (OUTPATIENT)
Dept: SURGERY | Facility: CLINIC | Age: 75
End: 2025-02-17
Payer: COMMERCIAL

## 2025-02-17 DIAGNOSIS — Z12.11 ENCOUNTER FOR SCREENING COLONOSCOPY: Primary | ICD-10-CM

## 2025-03-24 ENCOUNTER — TELEPHONE (OUTPATIENT)
Dept: SURGERY | Facility: CLINIC | Age: 75
End: 2025-03-24
Payer: COMMERCIAL

## 2025-03-24 ENCOUNTER — TELEPHONE (OUTPATIENT)
Dept: UROLOGY | Facility: CLINIC | Age: 75
End: 2025-03-24
Payer: COMMERCIAL

## 2025-03-24 DIAGNOSIS — R15.9 FULL INCONTINENCE OF FECES: Primary | ICD-10-CM

## 2025-03-24 NOTE — TELEPHONE ENCOUNTER
Spoke with pt about plan for SPT placement with Colorectal surgery for colostomy. Pt needed to have colonoscopy prior to moving forward with surgery. Also needs to have pulmonology addressed. Pt has colonoscopy scheduled at [facility] tomorrow 3/25/25 and pulmonology appt 3 weeks ago with recommendation for CT to check bronchial tree for curves d/t scoliosis per pt report. CT will be done on 3/26/25. Pt plans to call Og Nieves MD team to  see what needs to be done to move forward. Plans to call on 3/27/25. Nurse will follow-up with pt in a month.     Valeri Gee  RNCC Urology  Phone: [phone]

## 2025-03-24 NOTE — TELEPHONE ENCOUNTER
Patient confirmed scheduled appointment:  Date: 4/17/25  Time: 1130 am   Visit type: New Patient   Provider:    [place] [facility] [place]imaging: n/a  Additional notes: follow up after Colonoscopy

## 2025-03-24 NOTE — TELEPHONE ENCOUNTER
Diagnosis, Referred by & from: Colostomy   Appt date: 4/17/2025   NOTES STATUS DETAILS   OFFICE NOTE from referring provider N/A    OFFICE NOTE from other specialist Care Everywhere / [facility]:  11/16/23 - CR OV with Ana Lilia Byrne NP    [facility]:  9/5/24 - URO OV with Helene Porter NP  5/6/24 - URO OV with Dr. Duarte  4/11/24 - URO OV with Dr. Hawa Staley - [place]:  11/13/23, 8/22/23 - URO OV with DIMAS Lopez  8/30/23, 4/25/23 - URO OV with Dr. Hawa Staley - [facility]:  8/16/23 - PT OV with Jessica Stover PT     [facility]:  8/9/22, 10/27/21 - URO OV with Dr. Donahue  11/17/21 - PCC OV with Lacey Boyce NP   DISCHARGE SUMMARY from hospital Care Everywhere [facility]:  2/14/23 - Admission with Dr. Saavedra  5/17/22 - Admission with Dr. Saavedra     [facility]:  6/29/22 - Admission with Dr. Smith   DISCHARGE REPORT from the ER N/A    OPERATIVE REPORT [facility]:  10/9/23 - OP Note for CYSTOSCOPY WITH PERIURETHRAL BULKING AGENT INJECTION with Dr. Shaikh   MEDICATION LIST Care Everywhere    LABS     ANAL PAP/CEA N/A    BIOPSIES/PATHOLOGY RELATED TO DIAGNOSIS Care Everywhere [facility]:  ([facility]) 3/25/25  12/19/18 - Colon Biopsy (Case: [id number])    DIAGNOSTIC PROCEDURES     PFC TESTING (from the Pelvic floor center includes Manometry, PDNL, EMG, etc.) N/A    COLONOSCOPY (most recent all time after 5 years) Care Everywhere [facility]:  ([facility]) 3/25/25 - Colonoscopy  12/19/18 - Colonoscopy   FLEX SIGMOIDOSCOPY N/A    UPPER ENDOSCOPY (EGD) N/A    ERCP N/A    IMAGING (DISC & REPORT)      CT Received [facility]:  3/9/22 - CT Abd/Pelvis  10/16/20 - CT Abd/Pelvis   MRI N/A    XRAY Received [facility]:  7/4/22 - XR Abdomen   ULTRASOUND  (ENDOANAL/ENDORECTAL) N/A

## 2025-03-24 NOTE — PROGRESS NOTES
"Colon and Rectal Surgery Clinic Note    RE: Jenna Jhaveri.  : 1950.  LINK: 2025.    HPI:  73 year old female with fecal incontinence with cauda equina syndrome following spinal surgery with history of autoimmune necrotizing myopathy and restrictive lung disease on 3L oxygen. She lost control of bowel and urine completely after her spinal surgery in May 2022. Also does not have sensation. Cannot hold flatus. Only has bowel movements every few days and has complete incontinence with these. She cannot feel the stool coming out. Also complete urinary incontinence. Met with Dr. Shaikh of urology and was doing some bulking but had discussed possible SNS. Is also doing pelvic floor physical therapy.  Prior to her spinal surgery maybe had a small amount of stool leakage but no significant incontinence. She met with Ana Lilia Vazquez NP and she determined she was not a good candidate for SNS so she is here today to discuss a colostomy.     History of PE and restrictive lung disease secondary to kyphoscoliosis.  PSH: History of anal fistula with surgery in the remote past. Cholecystectomy. Two spinal surgeries.  She met with pulmonology 3/6/25  \"Her dyspnea is progressing and extensive evaluation for other etiologies has been negative. I think she may benefit from bronchial stenting to open up her BI and RLL. She will need future abdominal and pelvic surgeries, and stenting may reduce her risk of respiratory complications. \"    Last colonoscopy: Colonoscopy 2018 with one tubular adenoma.    Colonoscopy 3/25/25   Impression:       - Preparation of the colon was fair.        - Diverticulosis in the sigmoid colon.        - The examination was otherwise normal on direct and retroflexion views.        - No specimens collected.     Medical history:   Scoliosis    Psoriasis    Hyperlipidemia, mixed    Benign essential tremor    Restrictive lung disease    Kyphoscoliosis deformity of spine    Degenerative " arthritis of cervical spine    Tubular adenoma    Osteopenia    VICKI on CPAP    Prediabetes    Essential hypertension    Severe obesity (BMI 35.0-39.9) with comorbidity (HCC)    Obesity    Weight disorder    Chronic bilateral low back pain with bilateral sciatica    Shortness of breath    Dyspnea    Right middle lobe syndrome    Chronic respiratory failure with hypoxia   (HCC)    Proximal muscle weakness    Left leg numbness    Weakness    Adult osteomalacia, unspecified     Neuropathic pain of both legs    RLS (restless legs syndrome)    SOB (shortness of breath)    Autoimmune necrotizing myopathy    Neurogenic bladder    Actinic skin damage    Incontinence of feces    Infrarenal abdominal aortic aneurysm (AAA) without rupture    Mild episode of recurrent major depressive disorder    Statin intolerance    Coronary artery calcification seen on CT scan    Latex allergy       Surgical history:  Past Surgical History:   Procedure Laterality Date    CYSTOSCOPY, INJECT COLLAGEN, COMBINED N/A 10/9/2023    Procedure: CYSTOSCOPY, WITH PERIURETHRAL BULKING AGENT INJECTION;  Surgeon: Ivonne Shaikh MD;  Location: [facility] OR    ORTHOPEDIC SURGERY      multiple back procedures, knee surgery, carpal tunnel       Family history:  Family History   Problem Relation Age of Onset    Anesthesia Reaction No family hx of     Deep Vein Thrombosis (DVT) No family hx of      No Hx of IBD or GI malignancy    Medications:  Current Outpatient Medications   Medication Sig Dispense Refill    acetaminophen (TYLENOL) 500 MG tablet Take 500-1,000 mg by mouth every 8 hours as needed (Patient not taking: Reported on 4/1/2024)      albuterol (PROAIR HFA/PROVENTIL HFA/VENTOLIN HFA) 108 (90 Base) MCG/ACT inhaler Inhale 2 puffs into the lungs every 6 hours as needed for shortness of breath / dyspnea or wheezing      alendronate (FOSAMAX) 70 MG tablet Take 70 mg by mouth once a week Sundays      amLODIPine (NORVASC) 10 MG tablet Take 10 mg by mouth  daily      aspirin (ASA) 81 MG chewable tablet Take 81 mg by mouth every evening      azaTHIOprine (IMURAN) 50 MG tablet Take 1 tablet (50 mg) by mouth every evening 30 tablet 3    azithromycin (ZITHROMAX) 250 MG tablet TAKE 2 TABLETS BY MOUTH ON DAY 1, AND THEN TAKE 1 TABLET BY MOUTH ONCE A DAY ON DAY 2 THROUGH DAY 5      Calcium-Vitamin D-Vitamin K 500-200-40 MG-UNT-MCG CHEW Take 1 chew tab by mouth daily 650 Ca - 500 D - 40 K      cholecalciferol 50 MCG (2000 UT) tablet Take 2,000 Units by mouth daily      ciprofloxacin (CIPRO) 500 MG tablet Take 1 tablet by mouth 2 times daily.      DULoxetine (CYMBALTA) 60 MG capsule Take 60 mg by mouth daily      estradiol (ESTRACE) 0.1 MG/GM vaginal cream Place 1 g vaginally three times a week 42.5 g 11    fluocinonide (LIDEX) 0.05 % external solution Apply topically daily as needed (scalp)      fluticasone (FLONASE) 50 MCG/ACT nasal spray Spray 1 spray in nostril daily as needed for rhinitis      fluticasone-salmeterol (ADVAIR) 250-50 MCG/DOSE inhaler Inhale 1 puff into the lungs 2 times daily      furosemide (LASIX) 20 MG tablet Take 20 mg by mouth daily as needed (ankle swelling)      gabapentin (NEURONTIN) 300 MG capsule Take 300 mg by mouth 3 times daily (Patient not taking: Reported on 4/1/2024)      ibuprofen (ADVIL/MOTRIN) 600 MG tablet Take 600 mg by mouth every 8 hours as needed (Patient not taking: Reported on 4/1/2024)      ipratropium - albuterol 0.5 mg/2.5 mg/3 mL (DUONEB) 0.5-2.5 (3) MG/3ML neb solution Inhale 3 mLs into the lungs every 4 hours as needed for shortness of breath      loratadine (CLARITIN) 10 MG tablet Take 10 mg by mouth daily      methocarbamol (ROBAXIN) 500 MG tablet Take 500 mg by mouth 3 times daily as needed for muscle spasms      multivitamin w/minerals (MULTI-VITAMIN) tablet Take 1 tablet by mouth daily Uses here and there      naproxen sodium (ANAPROX) 220 MG tablet Take 220 mg by mouth every morning      nitrous oxide/oxygen 50/50 blend  "1 application. by inhalation route as directed. 3 lpm continuous      omeprazole (PRILOSEC) 40 MG DR capsule Take 40 mg by mouth daily      predniSONE (DELTASONE) 20 MG tablet Take by mouth daily take 2 tablets by oral route daily for 3 days, then take 1 tablet oral route daily for 3 days, then take 1/2 tablet oral route daily for 4 days (Patient not taking: Reported on 2/3/2025)      primidone (MYSOLINE) 50 MG tablet Take 50 mg by mouth 2 times daily      propranolol (INDERAL) 10 MG tablet Take 10 mg by mouth three times daily as needed (tremor).      propranolol ER (INDERAL LA) 80 MG 24 hr capsule Take 80 mg by mouth every morning      zinc sulfate (ZINCATE) 220 (50 Zn) MG capsule Take 220 mg by mouth daily         Allergies:  Allergies   Allergen Reactions    Potassium Shortness Of Breath and Palpitations     IV Potassium    Statins [Statins] Other (See Comments)     Statin induced myopathy    Penicillin G Swelling    Latex Hives     NO REACTION BUT SCREENING SCORE OF 6    Other Drug Allergy (See Comments) Muscle Pain (Myalgia) and Other (See Comments)     Statin induced myopathy       Social history:   Social History     Tobacco Use    Smoking status: Former     Current packs/day: 0.00     Types: Cigarettes     Quit date:      Years since quittin.2     Passive exposure: Past    Smokeless tobacco: Never   Substance Use Topics    Alcohol use: Never     Marital status: .    ROS:  A complete review of systems was performed with the patient and all systems negative except as per HPI.    Physical Examination:  /79 (BP Location: Left arm, Patient Position: Sitting, Cuff Size: Adult Regular)   Pulse 90   Ht 5' 3\"   Wt 205 lb   SpO2 (!) 90%   BMI 36.31 kg/m    General: Well hydrated. No acute distress.  Abdomen: Soft, NT, ND. Well healed RUQ subcostal incision, left paramedian incision from anterior spinal surgery.  Perianal external examination: deferred    Laboratory values reviewed:  Recent " Labs   Lab Test 04/01/24  1101 09/18/23  1302   WBC 6.1 7.8   HGB 14.2 14.0    252   CR  --  0.56   ALBUMIN  --  4.1   BILITOTAL  --  0.3   ALKPHOS  --  95   ALT 12 9   AST 17 14       ASSESSMENT  1. Fecal Incontinence, neurogenic  2. History of PE and restrictive lung disease secondary to kyphoscoliosis.  2. Autoimmune necrotizing myopathy  4. Obesity, BMI 36  5. History of spinal surgery complicated by cauda equina syndrome    PLAN  1. I discussed at length options, and the most effective longitudinal option would be a colostomy, which comes at significant risk given her medical comorbidities, specifically her lung disease. However, given the impact on her quality of life, I think it is reasonable to pursue.  2. We will reconvene after she is stented by pulmonology and see how she is doing.    Surgery Planned: joint case with Dr. Hillman for SPT, and my part will be a laparoscopic possible open end colostomy  Time needed: 120 minutes    Preoperative labs: CBC, CMP, PTT/INR, Prealbumin.  Mechanical bowel prep with oral antibiotics.  Ostomy marking with WOCN.  Hold these medications prior to surgery: none specific to surgery  Advised patient to begin protein shakes: Premier or Pure protein given high protein, low carb ratio for pre-operative rehab.      Risks, benefits, and alternatives of operative treatment were thoroughly discussed with the patient, she understands these well and agrees to proceed.    Time spent: 60 minutes,  >50% spent in discussing, counseling and coordinating care.    Og Lion M.D    Division of Colon and Rectal Surgery  [facility]    Referring Provider:  Arsalan Blas MD  [address] [place] [phone]     Primary Care Provider:  Arsalan Blas

## 2025-04-14 ENCOUNTER — TELEPHONE (OUTPATIENT)
Dept: WOUND CARE | Facility: CLINIC | Age: 75
End: 2025-04-14
Payer: COMMERCIAL

## 2025-04-14 NOTE — TELEPHONE ENCOUNTER
Patient confirmed scheduled appointment:  Date: 4/17/25  Time: 1030 am   Visit type: New Ostomy Nurse   Provider: Keila Joiner RN   Location: [facility]   Testing/imaging: n/a  Additional notes: Pt being referred by  for   ostomy education decision making

## 2025-04-17 ENCOUNTER — PATIENT OUTREACH (OUTPATIENT)
Dept: ONCOLOGY | Facility: CLINIC | Age: 75
End: 2025-04-17

## 2025-04-17 ENCOUNTER — OFFICE VISIT (OUTPATIENT)
Dept: WOUND CARE | Facility: CLINIC | Age: 75
End: 2025-04-17
Payer: COMMERCIAL

## 2025-04-17 ENCOUNTER — OFFICE VISIT (OUTPATIENT)
Dept: SURGERY | Facility: CLINIC | Age: 75
End: 2025-04-17
Payer: COMMERCIAL

## 2025-04-17 ENCOUNTER — PRE VISIT (OUTPATIENT)
Dept: SURGERY | Facility: CLINIC | Age: 75
End: 2025-04-17

## 2025-04-17 VITALS
DIASTOLIC BLOOD PRESSURE: 79 MMHG | BODY MASS INDEX: 36.32 KG/M2 | HEART RATE: 90 BPM | HEIGHT: 63 IN | SYSTOLIC BLOOD PRESSURE: 112 MMHG | WEIGHT: 205 LBS | OXYGEN SATURATION: 90 %

## 2025-04-17 DIAGNOSIS — R15.9 FULL INCONTINENCE OF FECES: ICD-10-CM

## 2025-04-17 DIAGNOSIS — R15.9 INCONTINENCE OF FECES, UNSPECIFIED FECAL INCONTINENCE TYPE: Primary | ICD-10-CM

## 2025-04-17 DIAGNOSIS — J39.8 TRACHEOMALACIA: Primary | ICD-10-CM

## 2025-04-17 DIAGNOSIS — J98.09 BRONCHOMALACIA: ICD-10-CM

## 2025-04-17 DIAGNOSIS — J96.10 CHRONIC RESPIRATORY FAILURE, UNSPECIFIED WHETHER WITH HYPOXIA OR HYPERCAPNIA (H): ICD-10-CM

## 2025-04-17 NOTE — LETTER
"2025       RE: Jenna Jhaveri  [address] [address]     Dear Colleague,    Thank you for referring your patient, Jenna Jhaveri, to the [facility] COLON AND RECTAL SURGERY CLINIC [place] [facility]. Please see a copy of my visit note below.    Colon and Rectal Surgery Clinic Note    RE: Jenna Jhaveri.  : 1950.  LINK: 2025.    HPI:  73 year old female with fecal incontinence with cauda equina syndrome following spinal surgery with history of autoimmune necrotizing myopathy and restrictive lung disease on 3L oxygen. She lost control of bowel and urine completely after her spinal surgery in May 2022. Also does not have sensation. Cannot hold flatus. Only has bowel movements every few days and has complete incontinence with these. She cannot feel the stool coming out. Also complete urinary incontinence. Met with Dr. Shaikh of urology and was doing some bulking but had discussed possible SNS. Is also doing pelvic floor physical therapy.  Prior to her spinal surgery maybe had a small amount of stool leakage but no significant incontinence. She met with Ana Lilia Vazquez NP and she determined she was not a good candidate for SNS so she is here today to discuss a colostomy.     History of PE and restrictive lung disease secondary to kyphoscoliosis.  PSH: History of anal fistula with surgery in the remote past. Cholecystectomy. Two spinal surgeries.  She met with pulmonology 3/6/25  \"Her dyspnea is progressing and extensive evaluation for other etiologies has been negative. I think she may benefit from bronchial stenting to open up her BI and RLL. She will need future abdominal and pelvic surgeries, and stenting may reduce her risk of respiratory complications. \"    Last colonoscopy: Colonoscopy 2018 with one tubular adenoma.    Colonoscopy 3/25/25   Impression:       - Preparation of the colon was fair.        " - Diverticulosis in the sigmoid colon.        - The examination was otherwise normal on direct and retroflexion views.        - No specimens collected.     Medical history:    Scoliosis     Psoriasis     Hyperlipidemia, mixed     Benign essential tremor     Restrictive lung disease     Kyphoscoliosis deformity of spine     Degenerative arthritis of cervical spine     Tubular adenoma     Osteopenia     VICKI on CPAP     Prediabetes     Essential hypertension     Severe obesity (BMI 35.0-39.9) with comorbidity (HCC)     Obesity     Weight disorder     Chronic bilateral low back pain with bilateral sciatica     Shortness of breath     Dyspnea     Right middle lobe syndrome     Chronic respiratory failure with hypoxia   (HCC)     Proximal muscle weakness     Left leg numbness     Weakness     Adult osteomalacia, unspecified      Neuropathic pain of both legs     RLS (restless legs syndrome)     SOB (shortness of breath)     Autoimmune necrotizing myopathy     Neurogenic bladder     Actinic skin damage     Incontinence of feces     Infrarenal abdominal aortic aneurysm (AAA) without rupture     Mild episode of recurrent major depressive disorder     Statin intolerance     Coronary artery calcification seen on CT scan     Latex allergy       Surgical history:  Past Surgical History:   Procedure Laterality Date     CYSTOSCOPY, INJECT COLLAGEN, COMBINED N/A 10/9/2023    Procedure: CYSTOSCOPY, WITH PERIURETHRAL BULKING AGENT INJECTION;  Surgeon: Ivonne Shaikh MD;  Location: [facility] OR     ORTHOPEDIC SURGERY      multiple back procedures, knee surgery, carpal tunnel       Family history:  Family History   Problem Relation Age of Onset     Anesthesia Reaction No family hx of      Deep Vein Thrombosis (DVT) No family hx of      No Hx of IBD or GI malignancy    Medications:  Current Outpatient Medications   Medication Sig Dispense Refill     acetaminophen (TYLENOL) 500 MG tablet Take 500-1,000 mg by mouth every 8 hours as  needed (Patient not taking: Reported on 4/1/2024)       albuterol (PROAIR HFA/PROVENTIL HFA/VENTOLIN HFA) 108 (90 Base) MCG/ACT inhaler Inhale 2 puffs into the lungs every 6 hours as needed for shortness of breath / dyspnea or wheezing       alendronate (FOSAMAX) 70 MG tablet Take 70 mg by mouth once a week Sundays       amLODIPine (NORVASC) 10 MG tablet Take 10 mg by mouth daily       aspirin (ASA) 81 MG chewable tablet Take 81 mg by mouth every evening       azaTHIOprine (IMURAN) 50 MG tablet Take 1 tablet (50 mg) by mouth every evening 30 tablet 3     azithromycin (ZITHROMAX) 250 MG tablet TAKE 2 TABLETS BY MOUTH ON DAY 1, AND THEN TAKE 1 TABLET BY MOUTH ONCE A DAY ON DAY 2 THROUGH DAY 5       Calcium-Vitamin D-Vitamin K 500-200-40 MG-UNT-MCG CHEW Take 1 chew tab by mouth daily 650 Ca - 500 D - 40 K       cholecalciferol 50 MCG (2000 UT) tablet Take 2,000 Units by mouth daily       ciprofloxacin (CIPRO) 500 MG tablet Take 1 tablet by mouth 2 times daily.       DULoxetine (CYMBALTA) 60 MG capsule Take 60 mg by mouth daily       estradiol (ESTRACE) 0.1 MG/GM vaginal cream Place 1 g vaginally three times a week 42.5 g 11     fluocinonide (LIDEX) 0.05 % external solution Apply topically daily as needed (scalp)       fluticasone (FLONASE) 50 MCG/ACT nasal spray Spray 1 spray in nostril daily as needed for rhinitis       fluticasone-salmeterol (ADVAIR) 250-50 MCG/DOSE inhaler Inhale 1 puff into the lungs 2 times daily       furosemide (LASIX) 20 MG tablet Take 20 mg by mouth daily as needed (ankle swelling)       gabapentin (NEURONTIN) 300 MG capsule Take 300 mg by mouth 3 times daily (Patient not taking: Reported on 4/1/2024)       ibuprofen (ADVIL/MOTRIN) 600 MG tablet Take 600 mg by mouth every 8 hours as needed (Patient not taking: Reported on 4/1/2024)       ipratropium - albuterol 0.5 mg/2.5 mg/3 mL (DUONEB) 0.5-2.5 (3) MG/3ML neb solution Inhale 3 mLs into the lungs every 4 hours as needed for shortness of  breath       loratadine (CLARITIN) 10 MG tablet Take 10 mg by mouth daily       methocarbamol (ROBAXIN) 500 MG tablet Take 500 mg by mouth 3 times daily as needed for muscle spasms       multivitamin w/minerals (MULTI-VITAMIN) tablet Take 1 tablet by mouth daily Uses here and there       naproxen sodium (ANAPROX) 220 MG tablet Take 220 mg by mouth every morning       nitrous oxide/oxygen 50/50 blend 1 application. by inhalation route as directed. 3 lpm continuous       omeprazole (PRILOSEC) 40 MG DR capsule Take 40 mg by mouth daily       predniSONE (DELTASONE) 20 MG tablet Take by mouth daily take 2 tablets by oral route daily for 3 days, then take 1 tablet oral route daily for 3 days, then take 1/2 tablet oral route daily for 4 days (Patient not taking: Reported on 2/3/2025)       primidone (MYSOLINE) 50 MG tablet Take 50 mg by mouth 2 times daily       propranolol (INDERAL) 10 MG tablet Take 10 mg by mouth three times daily as needed (tremor).       propranolol ER (INDERAL LA) 80 MG 24 hr capsule Take 80 mg by mouth every morning       zinc sulfate (ZINCATE) 220 (50 Zn) MG capsule Take 220 mg by mouth daily         Allergies:  Allergies   Allergen Reactions     Potassium Shortness Of Breath and Palpitations     IV Potassium     Statins [Statins] Other (See Comments)     Statin induced myopathy     Penicillin G Swelling     Latex Hives     NO REACTION BUT SCREENING SCORE OF 6     Other Drug Allergy (See Comments) Muscle Pain (Myalgia) and Other (See Comments)     Statin induced myopathy       Social history:   Social History     Tobacco Use     Smoking status: Former     Current packs/day: 0.00     Types: Cigarettes     Quit date:      Years since quittin.2     Passive exposure: Past     Smokeless tobacco: Never   Substance Use Topics     Alcohol use: Never     Marital status: .    ROS:  A complete review of systems was performed with the patient and all systems negative except as per  "HPI.    Physical Examination:  /79 (BP Location: Left arm, Patient Position: Sitting, Cuff Size: Adult Regular)   Pulse 90   Ht 5' 3\"   Wt 205 lb   SpO2 (!) 90%   BMI 36.31 kg/m    General: Well hydrated. No acute distress.  Abdomen: Soft, NT, ND. Well healed RUQ subcostal incision, left paramedian incision from anterior spinal surgery.  Perianal external examination: deferred    Laboratory values reviewed:  Recent Labs   Lab Test 04/01/24  1101 09/18/23  1302   WBC 6.1 7.8   HGB 14.2 14.0    252   CR  --  0.56   ALBUMIN  --  4.1   BILITOTAL  --  0.3   ALKPHOS  --  95   ALT 12 9   AST 17 14       ASSESSMENT  1. Fecal Incontinence, neurogenic  2. History of PE and restrictive lung disease secondary to kyphoscoliosis.  2. Autoimmune necrotizing myopathy  4. Obesity, BMI 36  5. History of spinal surgery complicated by cauda equina syndrome    PLAN  1. I discussed at length options, and the most effective longitudinal option would be a colostomy, which comes at significant risk given her medical comorbidities, specifically her lung disease. However, given the impact on her quality of life, I think it is reasonable to pursue.  2. We will reconvene after she is stented by pulmonology and see how she is doing.    Surgery Planned: joint case with Dr. Hillman for SPT, and my part will be a laparoscopic possible open end colostomy  Time needed: 120 minutes    Preoperative labs: CBC, CMP, PTT/INR, Prealbumin.  Mechanical bowel prep with oral antibiotics.  Ostomy marking with WOCN.  Hold these medications prior to surgery: none specific to surgery  Advised patient to begin protein shakes: Premier or Pure protein given high protein, low carb ratio for pre-operative rehab.      Risks, benefits, and alternatives of operative treatment were thoroughly discussed with the patient, she understands these well and agrees to proceed.    Time spent: 60 minutes,  >50% spent in discussing, counseling and coordinating " care.    Og Lion M.D    Division of Colon and Rectal Surgery  [facility]    Referring Provider:  Arsalan Blas MD  [address] [place] [phone]     Primary Care Provider:  Arsalan Blas      Again, thank you for allowing me to participate in the care of your patient.      Sincerely,    Og Lion MD, MD

## 2025-04-17 NOTE — PROGRESS NOTES
WOC Preoperative Ostomy Consult    Referral from Dr. Lion  Consult attended by patient and daughter  Diagnosis: Full incontinence of feces Date of Surgery: NA  Type of Surgery: not scheduled yet  Emotional readiness for surgery: no acute distress  Physical Limitations: Without limitations  Abdomen assessed for site by: Patient's ability to visiualize area, avoidance of skin creases and scars, palpating for rectus abdominus muscle, and clothing fit  Teaching: Anatomy/picture of stoma, stoma/bowel function postop, intro to pouches, diet, precautions with dehydration/blockage, written/media offered, and role of WOC/postop followup explained.  BRING MIRROR TO [facility] teaching kit used: Not applicable  Pt will see dr Lion today to discuss surgery options    Dr Lion was available for supervision of care if needed or if questions should arise and regarding plan of care.  Keila Joiner RN CWON

## 2025-04-17 NOTE — NURSING NOTE
"Chief Complaint   Patient presents with    Follow Up       Vitals:    04/17/25 1124   BP: 112/79   BP Location: Left arm   Patient Position: Sitting   Cuff Size: Adult Regular   Pulse: 90   SpO2: (!) 90%   Weight: 205 lb   Height: 5' 3\"       Body mass index is 36.31 kg/m .    Tanya Pratt CMA    "

## 2025-04-17 NOTE — PROGRESS NOTES
New IP (Interventional Pulmonology) referral rec'd.  Chart reviewed.       New Patient: Interventional Pulmonary (Lung nodule) Nurse Navigator Note    [facility]    Referred to (specialty): Interventional Pulmonary (Lung nodule)    Requested provider (if applicable): n/a    Date Referral Received: 4/17/2025    Evaluation for:  hronic mixed respiratory failure, bronchomalacia- bronchial stenting to open up her BI and RLL.    Clinical History (per Nurse review of records provided):    **BOOK MARKED**    CT CHEST ROUTINE WITHOUT IV CONTRAST     INDICATION:   Respiratory illness, nondiagnostic xray,Chronic respiratory failure with   bronchial torsion and obstruction from kyphoscoliosis, assess for   tracheobronchial malacia as well, worsening dyspnea,Restrictive lung disease     TECHNIQUE:   Sequential axial images are obtained from the thoracic inlet through the   adrenal glands without contrast. Coronal reconstructions obtained.         While obtaining CT images, dose reduction techniques were utilized including:     Automated exposure control, adjustment of mA and/or kV according to patient   size, and/or use of iterative reconstruction technique     RADIATION DOSE:   370 DLP (mGy cm)     COMPARISON:   06/28/2023     FINDINGS:   Normal heart size.  No pleural or pericardial effusion.  No mediastinal, hilar,   or axillary lymphadenopathy.  Tortuous aorta without aneurysm.  Mild emphysema.   Scarring at the right lung base.  No mass, nodule or consolidation.  Central   airway is normal.  No bronchiectasis or fibrosis.  Scoliosis with spinal   fixation noted.  Upper abdomen is normal.     IMPRESSION:   1. Mild emphysema with right lung base scarring.  No acute abnormality  Exam End: 03/26/25 11:05 AM      Records Location: [facility] &  ([facility])    RECORDS NEEDED:  Last FIVE years CHEST imaging pushed to PACS  from [facility]--thank you!!    Additional testing needed prior to consult: Hi-resolution CT and PFT's

## 2025-04-17 NOTE — PATIENT INSTRUCTIONS
Follow up:  Follow up with pulmonology for stenting   Follow up after you see pulmonology back to discuss surgical planning      Please call with any questions or concerns regarding your clinic visit today.    It is a pleasure being involved in your health care.    Contacts post-consultation depending on your need:    Radiology Appointments [phone]    Schedule Clinic Appointments [phone] # 1   M-F 7:30 - 5 pm    LEONARDA Joiner [phone]    Clinic Fax Number [phone]    Surgery Scheduling [phone]    My Chart is available 24 hours a day and is a secure way to access your records and communicate with your care team.  I strongly recommend signing up if you haven't already done so, if you are comfortable with computers.  If you would like to inquire about this or are having problems with My Chart access, you may call [phone] or go online at [email or web address].  Please allow at least 24 hours for a response and extra time on weekends and Holidays.

## 2025-04-18 ENCOUNTER — PATIENT OUTREACH (OUTPATIENT)
Dept: OTHER | Facility: CLINIC | Age: 75
End: 2025-04-18
Payer: COMMERCIAL

## 2025-04-21 NOTE — TELEPHONE ENCOUNTER
Discussed ACP with patient. Please refer to serious illness flowsheet for all clinically meaningful ACP-related information.  Serious Illness Conversation        4/21/2025     2:09 PM   SERIOUS ILLNESS CONVERSATION   People Present Patient   Given goals and what we know about the illness, what is the recommendation you gave to the patient? Create/update a Health Care Directive (provided [employer] handout and/or placed ACP facilitator order)   Recommendation/Next Step Comment Pt reported she has an older HCD from approx 10 years ago. [facility] encouraged pt to review old one and create a new HCD PRN. Pt reported she plans to complete the new PREPARE HCD and provide copy at next clinic appt.   Provided Serious Illness Family Communication Guide No   Other Resource Comments [facility]; [email or web address]. Psychoeducation re: ACP was provided.

## 2025-04-23 NOTE — TELEPHONE ENCOUNTER
RECORDS STATUS - ALL OTHER DIAGNOSIS      RECORDS RECEIVED FROM: Epic, [facility]   DATE RECEIVED: 4/23   NOTES STATUS DETAILS   OFFICE NOTE from referring provider Epic Dr. Og Lion: 4/17/25   LABS     ANYTHING RELATED TO DIAGNOSIS Epic/ 3/25/25   IMAGING (NEED IMAGES & REPORT)     CT SCANS PACS [facility]:  03/26/25, 04/22/21: CT Chest  06/28/23-09/04/20: CT Pulm   XRAYS PACS [facility]:  03/06/25-11/16/18: XR Chest

## 2025-04-30 ENCOUNTER — DOCUMENTATION ONLY (OUTPATIENT)
Dept: PULMONOLOGY | Facility: CLINIC | Age: 75
End: 2025-04-30
Payer: COMMERCIAL

## 2025-04-30 PROBLEM — L40.9 PSORIASIS: Status: ACTIVE | Noted: 2025-04-30

## 2025-04-30 PROBLEM — R53.1 WEAKNESS: Status: ACTIVE | Noted: 2021-04-05

## 2025-04-30 PROBLEM — L21.9 SEBORRHEIC DERMATITIS OF SCALP: Status: ACTIVE | Noted: 2023-01-11

## 2025-04-30 PROBLEM — J98.4 RESTRICTIVE LUNG DISEASE: Status: ACTIVE | Noted: 2023-02-14

## 2025-04-30 PROBLEM — G89.29 CHRONIC BILATERAL LOW BACK PAIN WITH BILATERAL SCIATICA: Status: ACTIVE | Noted: 2020-01-10

## 2025-04-30 PROBLEM — J98.19 RIGHT MIDDLE LOBE SYNDROME: Status: ACTIVE | Noted: 2020-09-15

## 2025-04-30 PROBLEM — G89.18 ACUTE POST-OPERATIVE PAIN: Status: ACTIVE | Noted: 2023-02-16

## 2025-04-30 PROBLEM — G25.81 RLS (RESTLESS LEGS SYNDROME): Status: ACTIVE | Noted: 2022-06-01

## 2025-04-30 PROBLEM — Z91.040 LATEX ALLERGY: Status: ACTIVE | Noted: 2024-12-11

## 2025-04-30 PROBLEM — M85.80 OSTEOPENIA: Status: ACTIVE | Noted: 2025-04-30

## 2025-04-30 PROBLEM — L57.8 ACTINIC SKIN DAMAGE: Status: ACTIVE | Noted: 2021-08-31

## 2025-04-30 PROBLEM — G72.49 AUTOIMMUNE NECROTIZING MYOPATHY: Status: ACTIVE | Noted: 2023-02-14

## 2025-04-30 PROBLEM — R06.00 DYSPNEA: Status: ACTIVE | Noted: 2020-09-09

## 2025-04-30 PROBLEM — D36.9 TUBULAR ADENOMA: Status: ACTIVE | Noted: 2025-04-30

## 2025-04-30 PROBLEM — M83.9 ADULT OSTEOMALACIA, UNSPECIFIED: Status: ACTIVE | Noted: 2021-08-05

## 2025-04-30 PROBLEM — M41.9 SCOLIOSIS: Status: ACTIVE | Noted: 2025-04-30

## 2025-04-30 PROBLEM — R20.0 LEFT LEG NUMBNESS: Status: ACTIVE | Noted: 2021-03-10

## 2025-04-30 PROBLEM — R15.9 INCONTINENCE OF FECES: Status: ACTIVE | Noted: 2023-05-10

## 2025-04-30 PROBLEM — M40.30 FLAT BACK SYNDROME: Status: ACTIVE | Noted: 2022-05-19

## 2025-04-30 PROBLEM — M54.10 RADICULAR SYNDROME OF RIGHT LEG: Chronic | Status: ACTIVE | Noted: 2023-02-14

## 2025-04-30 PROBLEM — Z86.711 HISTORY OF PULMONARY EMBOLUS (PE): Status: ACTIVE | Noted: 2023-02-14

## 2025-04-30 PROBLEM — M41.9 KYPHOSCOLIOSIS DEFORMITY OF SPINE: Status: ACTIVE | Noted: 2025-04-30

## 2025-04-30 PROBLEM — J96.11 CHRONIC RESPIRATORY FAILURE WITH HYPOXIA (H): Status: ACTIVE | Noted: 2020-12-11

## 2025-04-30 PROBLEM — G57.93 NEUROPATHIC PAIN OF BOTH LEGS: Status: ACTIVE | Noted: 2021-08-05

## 2025-04-30 PROBLEM — L28.0 LICHENIFICATION: Status: ACTIVE | Noted: 2021-08-31

## 2025-04-30 PROBLEM — I25.10 CORONARY ARTERY CALCIFICATION SEEN ON CT SCAN: Status: ACTIVE | Noted: 2024-12-11

## 2025-04-30 PROBLEM — M41.00 INFANTILE IDIOPATHIC SCOLIOSIS: Status: ACTIVE | Noted: 2023-02-14

## 2025-04-30 PROBLEM — E78.5 HYPERLIPIDEMIA: Status: ACTIVE | Noted: 2023-02-14

## 2025-04-30 PROBLEM — M47.812 DEGENERATIVE ARTHRITIS OF CERVICAL SPINE: Status: ACTIVE | Noted: 2025-04-30

## 2025-04-30 PROBLEM — R06.02 SHORTNESS OF BREATH: Status: ACTIVE | Noted: 2020-09-09

## 2025-04-30 PROBLEM — Z78.9 STATIN INTOLERANCE: Status: ACTIVE | Noted: 2024-12-10

## 2025-04-30 PROBLEM — F33.0 MILD EPISODE OF RECURRENT MAJOR DEPRESSIVE DISORDER: Status: ACTIVE | Noted: 2024-05-14

## 2025-04-30 PROBLEM — M62.81 PROXIMAL MUSCLE WEAKNESS: Status: ACTIVE | Noted: 2021-03-10

## 2025-04-30 PROBLEM — I71.43 INFRARENAL ABDOMINAL AORTIC ANEURYSM (AAA) WITHOUT RUPTURE: Status: ACTIVE | Noted: 2024-05-14

## 2025-04-30 PROBLEM — M54.41 CHRONIC BILATERAL LOW BACK PAIN WITH BILATERAL SCIATICA: Status: ACTIVE | Noted: 2020-01-10

## 2025-04-30 PROBLEM — Z86.007 HISTORY OF SQUAMOUS CELL CARCINOMA IN SITU (SCCIS): Status: ACTIVE | Noted: 2023-01-11

## 2025-04-30 PROBLEM — M54.42 CHRONIC BILATERAL LOW BACK PAIN WITH BILATERAL SCIATICA: Status: ACTIVE | Noted: 2020-01-10

## 2025-04-30 PROBLEM — B35.1 DERMATOPHYTOSIS OF NAIL: Status: ACTIVE | Noted: 2023-01-11

## 2025-04-30 RX ORDER — CODEINE PHOSPHATE AND GUAIFENESIN 10; 100 MG/5ML; MG/5ML
SOLUTION ORAL EVERY 6 HOURS PRN
COMMUNITY
Start: 2025-03-06

## 2025-04-30 RX ORDER — ICOSAPENT ETHYL 1 G/1
2 CAPSULE ORAL
COMMUNITY
Start: 2025-04-08

## 2025-04-30 RX ORDER — MOMETASONE FUROATE 1 MG/ML
SOLUTION TOPICAL
COMMUNITY
Start: 2024-01-18

## 2025-04-30 RX ORDER — DULOXETIN HYDROCHLORIDE 30 MG/1
30 CAPSULE, DELAYED RELEASE ORAL EVERY MORNING
COMMUNITY
Start: 2025-03-11

## 2025-04-30 RX ORDER — MUPIROCIN 20 MG/G
OINTMENT TOPICAL
COMMUNITY
Start: 2025-01-29

## 2025-04-30 RX ORDER — FEXOFENADINE HCL 180 MG/1
TABLET ORAL
COMMUNITY
Start: 2024-01-18

## 2025-04-30 RX ORDER — ECONAZOLE NITRATE 10 MG/G
CREAM TOPICAL
COMMUNITY

## 2025-04-30 RX ORDER — IPRATROPIUM BROMIDE 42 UG/1
2 SPRAY, METERED NASAL
COMMUNITY
Start: 2025-03-06 | End: 2025-06-04

## 2025-04-30 NOTE — NURSING NOTE
Pre-visit planning and chart review completed.     NEW patient appointment:    5/6 with Dr. Suresh Resendiz  5/1 HRCT  5/9 PFTs    - On Aspirin  - Former smoker.    CE updated. Medications, allergies, problem list, and immunizations reconciled.

## 2025-05-01 ENCOUNTER — PREP FOR PROCEDURE (OUTPATIENT)
Dept: SURGERY | Facility: CLINIC | Age: 75
End: 2025-05-01
Payer: COMMERCIAL

## 2025-05-01 DIAGNOSIS — R79.1 ABNORMAL COAGULATION PROFILE: ICD-10-CM

## 2025-05-01 DIAGNOSIS — R94.5 ABNORMAL RESULTS OF LIVER FUNCTION STUDIES: ICD-10-CM

## 2025-05-01 DIAGNOSIS — R15.9 INCONTINENCE OF FECES, UNSPECIFIED FECAL INCONTINENCE TYPE: Primary | ICD-10-CM

## 2025-05-01 DIAGNOSIS — R15.9 BOWEL AND BLADDER INCONTINENCE: ICD-10-CM

## 2025-05-01 DIAGNOSIS — R32 BOWEL AND BLADDER INCONTINENCE: ICD-10-CM

## 2025-05-01 RX ORDER — ONDANSETRON 4 MG/1
4 TABLET, FILM COATED ORAL EVERY 6 HOURS
Qty: 3 TABLET | Refills: 0 | Status: SHIPPED | OUTPATIENT
Start: 2025-05-01

## 2025-05-01 RX ORDER — METRONIDAZOLE 500 MG/1
500 TABLET ORAL EVERY 6 HOURS
Qty: 3 TABLET | Refills: 0 | Status: SHIPPED | OUTPATIENT
Start: 2025-05-01

## 2025-05-01 RX ORDER — NEOMYCIN SULFATE 500 MG/1
1000 TABLET ORAL EVERY 6 HOURS
Qty: 6 TABLET | Refills: 0 | Status: SHIPPED | OUTPATIENT
Start: 2025-05-01

## 2025-05-01 RX ORDER — POLYETHYLENE GLYCOL 3350 17 G/17G
POWDER, FOR SOLUTION ORAL
Qty: 238 G | Refills: 0 | Status: SHIPPED | OUTPATIENT
Start: 2025-05-01

## 2025-05-01 RX ORDER — MAGNESIUM CARB/ALUMINUM HYDROX 105-160MG
296 TABLET,CHEWABLE ORAL ONCE
Qty: 296 ML | Refills: 0 | Status: SHIPPED | OUTPATIENT
Start: 2025-05-01 | End: 2025-05-01

## 2025-05-06 ENCOUNTER — PRE VISIT (OUTPATIENT)
Dept: PULMONOLOGY | Facility: CLINIC | Age: 75
End: 2025-05-06
Payer: COMMERCIAL

## 2025-05-15 NOTE — TELEPHONE ENCOUNTER
RECORDS STATUS - ALL OTHER DIAGNOSIS             [facility]   DATE RECEIVED: 5/15   NOTES STATUS DETAILS   OFFICE NOTE from referring provider Epic Dr. Og Lion: 4/17/25   LABS       ANYTHING RELATED TO DIAGNOSIS Epic/ 3/25/25   IMAGING (NEED IMAGES & REPORT)       CT SCANS PACS [facility]:  03/26/25, 04/22/21: CT Chest  06/28/23-09/04/20: CT Pulm   XRAYS PACS [facility]:  03/06/25-11/16/18: XR Chest

## 2025-05-20 NOTE — CONFIDENTIAL NOTE
FUTURE VISIT INFORMATION      SURGERY INFORMATION:  Date: 6.25.25   Location:  [facility] OR   Surgeon:  Bigg Duarte MD / Og Lion MD   Anesthesia Type:  General   Procedure: cystoscopy and suprapubic tube placement   LAPAROSCOPIC, POSSIBLE OPEN, CREATION COLOSTOMY     RECORDS REQUESTED FROM:       Primary Care Provider:  --Arsalan Blas MD    Pertinent Medical History:  10.9.23 [place]     Most recent PFT's:  Scheduled 5.29.25   Most recent Sleep Study:

## 2025-05-29 ENCOUNTER — HOSPITAL ENCOUNTER (OUTPATIENT)
Dept: RESPIRATORY THERAPY | Facility: CLINIC | Age: 75
End: 2025-05-29
Attending: INTERNAL MEDICINE
Payer: COMMERCIAL

## 2025-05-29 DIAGNOSIS — J39.8 TRACHEOMALACIA: ICD-10-CM

## 2025-05-29 LAB
DLCOUNC-%PRED-PRE: 42 %
DLCOUNC-PRE: 7.73 ML/MIN/MMHG
DLCOUNC-PRED: 18.14 ML/MIN/MMHG
ERV-%PRED-PRE: 19 %
ERV-PRE: 0.17 L
ERV-PRED: 0.88 L
EXPTIME-PRE: 6.08 SEC
FEF2575-%PRED-POST: 42 %
FEF2575-%PRED-PRE: 41 %
FEF2575-POST: 0.7 L/SEC
FEF2575-PRE: 0.68 L/SEC
FEF2575-PRED: 1.65 L/SEC
FEFMAX-%PRED-PRE: 71 %
FEFMAX-PRE: 3.61 L/SEC
FEFMAX-PRED: 5.06 L/SEC
FEV1-%PRED-PRE: 49 %
FEV1-PRE: 0.99 L
FEV1FEV6-PRE: 73 %
FEV1FEV6-PRED: 78 %
FEV1FVC-PRE: 73 %
FEV1FVC-PRED: 78 %
FEV1SVC-PRE: 69 %
FEV1SVC-PRED: 68 %
FIFMAX-PRE: 2.74 L/SEC
FRCPLETH-%PRED-PRE: 61 %
FRCPLETH-PRE: 1.73 L
FRCPLETH-PRED: 2.81 L
FVC-%PRED-PRE: 52 %
FVC-PRE: 1.35 L
FVC-PRED: 2.59 L
IC-%PRED-PRE: 71 %
IC-PRE: 1.26 L
IC-PRED: 1.77 L
RVPLETH-%PRED-PRE: 73 %
RVPLETH-PRE: 1.55 L
RVPLETH-PRED: 2.1 L
TLCPLETH-%PRED-PRE: 62 %
TLCPLETH-PRE: 2.98 L
TLCPLETH-PRED: 4.77 L
VA-%PRED-PRE: 50 %
VA-PRE: 2.21 L
VC-%PRED-PRE: 48 %
VC-PRE: 1.43 L
VC-PRED: 2.92 L

## 2025-05-29 PROCEDURE — 94726 PLETHYSMOGRAPHY LUNG VOLUMES: CPT

## 2025-05-29 PROCEDURE — 94729 DIFFUSING CAPACITY: CPT

## 2025-05-29 PROCEDURE — 94010 BREATHING CAPACITY TEST: CPT

## 2025-05-29 PROCEDURE — 94060 EVALUATION OF WHEEZING: CPT

## 2025-05-29 NOTE — DISCHARGE INSTRUCTIONS
Thank you for completing pulmonary function testing today.  All results will be scanned into your epic results for your doctor to review.  Please resume taking all your current prescribed medications and diet as directed by your provider.   If you have not heard from your provider about your testing within two weeks and do not have a follow-up appointment scheduled with them please contact your provider about any questions you have concerning your testing.   Thank you  [facility] Pulmonary Function Lab

## 2025-05-29 NOTE — ADDENDUM NOTE
Encounter addended by: Fe Gray RT on: 5/29/2025 2:59 PM   Actions taken: Charge Capture section accepted

## 2025-06-03 ENCOUNTER — PRE VISIT (OUTPATIENT)
Dept: PULMONOLOGY | Facility: CLINIC | Age: 75
End: 2025-06-03
Payer: COMMERCIAL

## 2025-06-09 LAB
ABO + RH BLD: NORMAL
BLD GP AB SCN SERPL QL: NEGATIVE
SPECIMEN EXP DATE BLD: NORMAL

## 2025-06-10 ENCOUNTER — ANESTHESIA EVENT (OUTPATIENT)
Dept: SURGERY | Facility: CLINIC | Age: 75
End: 2025-06-10
Payer: COMMERCIAL

## 2025-06-10 ENCOUNTER — OFFICE VISIT (OUTPATIENT)
Dept: SURGERY | Facility: CLINIC | Age: 75
End: 2025-06-10
Payer: COMMERCIAL

## 2025-06-10 ENCOUNTER — PRE VISIT (OUTPATIENT)
Dept: SURGERY | Facility: CLINIC | Age: 75
End: 2025-06-10

## 2025-06-10 ENCOUNTER — OFFICE VISIT (OUTPATIENT)
Dept: WOUND CARE | Facility: CLINIC | Age: 75
End: 2025-06-10
Payer: COMMERCIAL

## 2025-06-10 ENCOUNTER — LAB (OUTPATIENT)
Dept: LAB | Facility: CLINIC | Age: 75
End: 2025-06-10
Payer: COMMERCIAL

## 2025-06-10 VITALS
TEMPERATURE: 97.7 F | HEART RATE: 72 BPM | SYSTOLIC BLOOD PRESSURE: 113 MMHG | WEIGHT: 213 LBS | BODY MASS INDEX: 37.74 KG/M2 | HEIGHT: 63 IN | DIASTOLIC BLOOD PRESSURE: 78 MMHG | OXYGEN SATURATION: 95 %

## 2025-06-10 DIAGNOSIS — R15.9 BOWEL AND BLADDER INCONTINENCE: ICD-10-CM

## 2025-06-10 DIAGNOSIS — R32 BOWEL AND BLADDER INCONTINENCE: ICD-10-CM

## 2025-06-10 DIAGNOSIS — Z01.818 PRE-OP EVALUATION: Primary | ICD-10-CM

## 2025-06-10 DIAGNOSIS — R15.9 INCONTINENCE OF FECES, UNSPECIFIED FECAL INCONTINENCE TYPE: ICD-10-CM

## 2025-06-10 DIAGNOSIS — Z01.818 PRE-OP EVALUATION: ICD-10-CM

## 2025-06-10 DIAGNOSIS — R79.1 ABNORMAL COAGULATION PROFILE: ICD-10-CM

## 2025-06-10 DIAGNOSIS — R39.89 SUSPECTED UTI: ICD-10-CM

## 2025-06-10 DIAGNOSIS — R94.5 ABNORMAL RESULTS OF LIVER FUNCTION STUDIES: ICD-10-CM

## 2025-06-10 LAB
ALBUMIN SERPL BCG-MCNC: 4.1 G/DL (ref 3.5–5.2)
ALBUMIN UR-MCNC: NEGATIVE MG/DL
ALP SERPL-CCNC: 118 U/L (ref 40–150)
ALT SERPL W P-5'-P-CCNC: 15 U/L (ref 0–50)
ANION GAP SERPL CALCULATED.3IONS-SCNC: 10 MMOL/L (ref 7–15)
APPEARANCE UR: ABNORMAL
APTT PPP: 33 SECONDS (ref 22–38)
AST SERPL W P-5'-P-CCNC: 18 U/L (ref 0–45)
BACTERIA #/AREA URNS HPF: ABNORMAL /HPF
BASOPHILS # BLD AUTO: 0 10E3/UL (ref 0–0.2)
BASOPHILS NFR BLD AUTO: 1 %
BILIRUB SERPL-MCNC: 0.2 MG/DL
BILIRUB UR QL STRIP: NEGATIVE
BUN SERPL-MCNC: 15.3 MG/DL (ref 8–23)
CALCIUM SERPL-MCNC: 9.3 MG/DL (ref 8.8–10.4)
CHLORIDE SERPL-SCNC: 101 MMOL/L (ref 98–107)
COLOR UR AUTO: YELLOW
CREAT SERPL-MCNC: 0.61 MG/DL (ref 0.51–0.95)
EGFRCR SERPLBLD CKD-EPI 2021: >90 ML/MIN/1.73M2
EOSINOPHIL # BLD AUTO: 0.3 10E3/UL (ref 0–0.7)
EOSINOPHIL NFR BLD AUTO: 4 %
ERYTHROCYTE [DISTWIDTH] IN BLOOD BY AUTOMATED COUNT: 15.7 % (ref 10–15)
GLUCOSE SERPL-MCNC: 98 MG/DL (ref 70–99)
GLUCOSE UR STRIP-MCNC: NEGATIVE MG/DL
HCO3 SERPL-SCNC: 30 MMOL/L (ref 22–29)
HCT VFR BLD AUTO: 47.4 % (ref 35–47)
HGB BLD-MCNC: 14.3 G/DL (ref 11.7–15.7)
HGB UR QL STRIP: ABNORMAL
IMM GRANULOCYTES # BLD: 0 10E3/UL
IMM GRANULOCYTES NFR BLD: 0 %
INR PPP: 0.96 (ref 0.85–1.15)
KETONES UR STRIP-MCNC: NEGATIVE MG/DL
LEUKOCYTE ESTERASE UR QL STRIP: ABNORMAL
LYMPHOCYTES # BLD AUTO: 1.5 10E3/UL (ref 0.8–5.3)
LYMPHOCYTES NFR BLD AUTO: 21 %
MCH RBC QN AUTO: 27.8 PG (ref 26.5–33)
MCHC RBC AUTO-ENTMCNC: 30.2 G/DL (ref 31.5–36.5)
MCV RBC AUTO: 92 FL (ref 78–100)
MONOCYTES # BLD AUTO: 0.7 10E3/UL (ref 0–1.3)
MONOCYTES NFR BLD AUTO: 10 %
MUCOUS THREADS #/AREA URNS LPF: PRESENT /LPF
NEUTROPHILS # BLD AUTO: 4.5 10E3/UL (ref 1.6–8.3)
NEUTROPHILS NFR BLD AUTO: 64 %
NITRATE UR QL: POSITIVE
NRBC # BLD AUTO: 0 10E3/UL
NRBC BLD AUTO-RTO: 0 /100
PH UR STRIP: 6 [PH] (ref 5–7)
PLATELET # BLD AUTO: 254 10E3/UL (ref 150–450)
POTASSIUM SERPL-SCNC: 4.1 MMOL/L (ref 3.4–5.3)
PREALB SERPL-MCNC: 22.7 MG/DL (ref 20–40)
PROT SERPL-MCNC: 7.4 G/DL (ref 6.4–8.3)
PROTHROMBIN TIME: 13 SECONDS (ref 11.8–14.8)
RBC # BLD AUTO: 5.14 10E6/UL (ref 3.8–5.2)
RBC URINE: 8 /HPF
SODIUM SERPL-SCNC: 141 MMOL/L (ref 135–145)
SP GR UR STRIP: 1.02 (ref 1–1.03)
SQUAMOUS EPITHELIAL: 7 /HPF
UROBILINOGEN UR STRIP-MCNC: NORMAL MG/DL
WBC # BLD AUTO: 7.1 10E3/UL (ref 4–11)
WBC CLUMPS #/AREA URNS HPF: PRESENT /HPF
WBC URINE: >182 /HPF

## 2025-06-10 PROCEDURE — 87186 SC STD MICRODIL/AGAR DIL: CPT | Performed by: UROLOGY

## 2025-06-10 PROCEDURE — 85025 COMPLETE CBC W/AUTO DIFF WBC: CPT | Performed by: PATHOLOGY

## 2025-06-10 PROCEDURE — 36415 COLL VENOUS BLD VENIPUNCTURE: CPT | Performed by: PATHOLOGY

## 2025-06-10 PROCEDURE — 99211 OFF/OP EST MAY X REQ PHY/QHP: CPT

## 2025-06-10 PROCEDURE — 81001 URINALYSIS AUTO W/SCOPE: CPT | Performed by: PATHOLOGY

## 2025-06-10 PROCEDURE — 84134 ASSAY OF PREALBUMIN: CPT | Performed by: SURGERY

## 2025-06-10 PROCEDURE — 85730 THROMBOPLASTIN TIME PARTIAL: CPT | Performed by: PATHOLOGY

## 2025-06-10 PROCEDURE — 85610 PROTHROMBIN TIME: CPT | Performed by: PATHOLOGY

## 2025-06-10 PROCEDURE — 99000 SPECIMEN HANDLING OFFICE-LAB: CPT | Performed by: PATHOLOGY

## 2025-06-10 PROCEDURE — 80053 COMPREHEN METABOLIC PANEL: CPT | Performed by: PATHOLOGY

## 2025-06-10 RX ORDER — PRIMIDONE 50 MG/1
50 TABLET ORAL 2 TIMES DAILY
COMMUNITY

## 2025-06-10 ASSESSMENT — LIFESTYLE VARIABLES: TOBACCO_USE: 0

## 2025-06-10 ASSESSMENT — PAIN SCALES - GENERAL: PAINLEVEL_OUTOF10: NO PAIN (0)

## 2025-06-10 ASSESSMENT — ENCOUNTER SYMPTOMS: SEIZURES: 0

## 2025-06-10 NOTE — PROGRESS NOTES
WOC Preoperative Ostomy Consult    Referral from Dr. Lion/Rafy  Consult attended by patient and daughter  Diagnosis:    Incontinence of feces, unspecified fecal incontinence type  Bowel and bladder incontinence Date of Surgery: 06/25/2025  Type of Surgery: LAPAROSCOPIC, POSSIBLE OPEN, CREATION COLOSTOMY, cystoscopy and suprapubic tube placement   Emotional readiness for surgery: no acute distress  Physical Limitations: Without limitations  Abdomen assessed for site by: Patient's ability to visiualize area, avoidance of skin creases and scars, palpating for rectus abdominus muscle, and clothing fit  Teaching: Anatomy/picture of stoma, stoma/bowel function postop, intro to pouches, diet, precautions with dehydration/blockage, written/media offered, and role of WOC/postop followup explained.  [facility]  [place] teaching kit used: yes  Pt will see dr Lion today to discuss surgery options      Ana Lilia Goss NP was available for supervision of care if needed or if questions should arise and regarding plan of care.  Keila Joiner RN [facility]

## 2025-06-10 NOTE — PATIENT INSTRUCTIONS
Preparing for Your Surgery      Name:  Jenna Jhaveri   MRN:  [id number]   :  1950   Today's Date:  6/10/2025     The [place] [employer] [address] [employer] Project                                Timeline 2025 -2025    This project will affect travel to the [facility] and [facility], as well as the [facility] and [facility] Center.      Please check the [facility] [address] project website for the most up to date information and give yourself additional time to reach your destination.        Arriving for surgery:  Surgery date:  25  Arrival time:  5:30 am  Surgery time: 7:30 am    Please come to:     Please come to:       [facility]    [address] [place]  [address]     The [facility] ([facility] [place] Patient/Visitor Ramp is at [address]. Patients and visitors who self-park will receive the reduced hospital parking rate. If the Patient /Visitor Ramp is full, please follow the signs to the Neighborhoods car park located at the [facility] hospital entrance.       parking is available (24 hours/ 7 days a week)      Discounted parking pass options are available for patients and visitors. They can be purchased at the [employer] desk at the [facility] hospital entrance.     -    Stop at the security desk and they will direct surgery patients to the Surgery Check in and Family Lounge. [phone]        - If you need directions, a wheelchair or an escort please stop at the Information/security desk in the lobby.     What can I eat or drink? Follow your surgeon's bowel prep instructions below;    BOWEL PREP: MIRALAX/GATORADE  ANTIBIOTICS/ZOFRAN     Please go to your local pharmacy or store and purchase:  - 8.3 oz bottle of  Miralax (Powder)  - 10 oz bottle of Magnesium Citrate  - 64 oz of Gatorade (no red or purple)  **Please  the prescribed pre-surgery  medications from your pharmacy     FIVE DAYS BEFORE YOUR SURGERY     - Stop taking any of the following over-the-counter medications: Ibuprofen, Asprin,Iron supplements.     - If you are taking a blood thinner medication such as Coumadin, Plavix, or Warfarin, you MUST contact the prescribing physician regarding clearance for this procedure, or instructions for stopping/changing this medication before your procedure.      -Please contact the nurse line at [phone] option #3 for any questions regarding other medications.      THREE DAYS BEFORE YOUR SURGERY     - Begin restricted low-residue diet.  Suggested foods include: white bread or rolls, plain crackers, white rice, skinless potatoes, low fiber cereals, chicken, turkey, fish or seafood, and eggs.  You may also have applesauce, pear sauce, soft honeydew, cantaloupe, ripe banana, canned fruit without seeds or skins.       - Do not eat: Corn (any form), raw vegetables, foods with seeds, whole wheat or grain breads and cereals, brown or wild rice, granola, raisins and dried fruit, berries, popcorn, all varieties of nuts, peanuts, and seeds.               THE DAY BEFORE YOUR SURGERY     - Begin allowed clear liquid diet at breakfast time.     - Mix Miralax and 64 oz. of Gatorade in a pitcher, and place in the fridge.       - Begin the allowed clear liquid diet at breakfast time.  Drink at least 1 (one) gallon of water or allowed clear liquids throughout the day.       ANTIBIOTICS     8:00 A.M.- Take one tab of Metronidazole (flagyl) and two tabs of Neomycin with one tab of ondansetron (zofran). Please take the ondansetron with the antibiotics as they can cause nausea.      2:00 P.M.- Take one tab of Metronidazole (flagyl) and two tabs of Neomycin with one tab of ondansetron (zofran). Please take the ondansetron with the antibiotics as they can cause nausea.      8:00 P.M.- Take one tab of Metronidazole (flagyl) and two tabs of Neomycin with one tab of ondansetron  (zofran). Please take the ondansetron with the antibiotics as they can cause nausea.          Bowel Prep:     4:00 P.M.  Start drinking one 8-ounce glass of the solution every 15-30 minutes till all 64 oz are gone. If you start to feel nauseous, you may space out your drinking intervals.      8:00 P.M. Drink the bottle of Magnesium Citrate.     You may have CLEAR LIQUIDS until 2 hours prior to your arrival - stop by 3:30 am on 6/25/25.     ALLOWED CLEAR LIQUIDS  - Water  - Regular or decaf coffee (no cream)  - Jell-O or popsicles (no red or purple)  - Plain hard candy (no red or purple)  - Clear juices or Gatorade (no red or purple)  - Chicken broth (no vegetables or noodles)  - Ensure Clear or Boost Clear     DO NOT DRINK  - Milk or mild products such as ice cream, malts, or shakes  - Boost or other protein drinks  - Red or purple juices of any kind  - Sammy-aid, cranberry, cherry, or grape juice  - Juice with pulp (orange, grapefruit, pineapple, or tomato)  - Cream soups of any kind  - Alcoholic beverages     -  No Alcohol or cannabis products for at least 24 hours before surgery.     Which medicines can I take?    Hold Aspirin for 7 days before surgery.   Hold Multivitamins for 7 days before surgery.  Hold Supplements for 7 days before surgery.  Hold Ibuprofen (Advil, Motrin) for 1 day(s) before surgery--unless otherwise directed by surgeon.  Hold Naproxen (Aleve) for 4 days before surgery.    -  DO NOT take these medications the day of surgery:  Topical medications  Lasix  Zinc     -  PLEASE TAKE these medications per your usual routine:  Albuterol inhaler if needed and bring this with you  Amlodipine (Norvasc)  Duloxetine (Cymbalta)  Fexofenadine (Allegra) if needed  Flonase nasal spray  Advair inhaler and bring this with you  Robitussin if needed  Vascepa  Duoneb if needed  Claritin   Prilosec  Primidone  Propranolol    How do I prepare myself?  - Please take 2 showers (one the night prior to surgery and one  the morning of surgery) using Scrubcare or Hibiclens soap.    Use this soap only from the neck to your toes. Avoid genital area      Leave the soap on your skin for one minute--then rinse thoroughly.      You may use your own shampoo and conditioner. No other hair products.   - Please remove all jewelry and body piercings.  - No lotions, deodorants or fragrance.  - No makeup or fingernail polish.   - Bring your ID and insurance card.    -For patients being admitted to the [facility]  Family members are to take the patient belongings with them and place them in the lockers provided in the Family Lounge.  Please limit the items you bring to 1 bag as the lockers are small.      -If you use a CPAP machine, please bring the CPAP machine, tubing, and mask to hospital.    -If you have a Deep Brain Stimulator, Spinal Cord Stimulator, or any Neuro Stimulator device---you must bring the remote control to the hospital.      ALL PATIENTS GOING HOME THE SAME DAY OF SURGERY ARE REQUIRED TO HAVE A RESPONSIBLE ADULT TO DRIVE AND BE IN ATTENDANCE WITH THEM FOR 24 HOURS FOLLOWING SURGERY.    Covid testing policy as of 12/06/2022  Your surgeon will notify and schedule you for a COVID test if one is needed before surgery--please direct any questions or COVID symptoms to your surgeon      Questions or Concerns:    - For any questions regarding the day of surgery or your hospital stay, please contact the Pre Admission Nursing Office at [phone].       - If you have health changes between today and your surgery, please call your surgeon.       - For questions after surgery, please call your surgeons office.           Current Visitor Guidelines    2 adult visitors for adult patients in the pre op area    If additional visitors come (beyond a patient care attendant or a group home caregiver), the additional visitors will be asked to wait in the main lobby of the hospital    Visiting hours: 8 a.m. to 8:30 p.m.    Patients  confirmed or suspected to have symptoms of COVID 19 or flu:     No visitors allowed for adult patients.   Children (under age 18) can have 1 named visitor.     People who are sick or showing symptoms of COVID 19 or flu:    Are not allowed to visit patients--we can only make exceptions in special situations.       Please follow these guidelines for your visit:          Please maintain social distance          Masking is optional--however at times you may be asked to wear a mask for the safety of yourself and others     Clean your hands with alcohol hand . Do this when you arrive at and leave the building and patient room,    And again after you touch your mask or anything in the room.     Go directly to and from the room you are visiting.     Stay in the patient s room during your visit. Limit going to other places in the hospital as much as possible     Leave bags and jackets at home or in the car.     For everyone s health, please don t come and go during your visit. That includes for smoking   during your visit.

## 2025-06-10 NOTE — H&P
Pre-Operative H & P     CC:  Preoperative exam to assess for increased cardiopulmonary risk while undergoing surgery and anesthesia.    Date of Encounter: 6/10/2025  Primary Care Physician:  Arsalan Blas     Reason for visit:   Encounter Diagnosis   Name Primary?    Pre-op evaluation Yes       HPI  Jenna Jhaveri is a 75 year old female who presents for pre-operative H & P in preparation for  Procedure Information       Case: [id number] Date/Time: 06/25/25 0730    Procedures:       cystoscopy and suprapubic tube placement (Urethra)      LAPAROSCOPIC, POSSIBLE OPEN, CREATION COLOSTOMY (Abdomen)    Anesthesia type: General    Diagnosis:       Incontinence of feces, unspecified fecal incontinence type [R15.9]      Bowel and bladder incontinence [R32, R15.9]      Abnormal coagulation profile [R79.1]      Abnormal results of liver function studies [R94.5]    Pre-op diagnosis:       Incontinence of feces, unspecified fecal incontinence type [R15.9]      Bowel and bladder incontinence [R32, R15.9]      Abnormal coagulation profile [R79.1]      Abnormal results of liver function studies [R94.5]    Location: [facility]  /  OR    Providers: Bigg Duarte MD; Og Lion MD            Patient is being evaluated for comorbid conditions of hypertension, dyslipidemia, VICKI,  h/o PE, restrictive lung disease, bronchomalacia,  prediabetes, RLS,  polymyositis, Fatback syndrome, obesity    Ms. Jhaveri has known fecal incontinence/ bladder incontinence with cauda equina syndrome following spinal surgery.  She has a history of autoimmune necrotizing myopathy and restrictive lung disease requiring 3 L of oxygen.  She lost control of her bowel and urine completely after spine surgery in May 2022.  She follows with urology and colorectal surgery.  She is now scheduled for SPT and colostomy as above.     History is obtained from the patient and chart review. Patients daughter was also present for this visit.     Hx  of abnormal bleeding or anti-platelet use: ASA 81 mg    Menstrual history: No LMP recorded. Patient is postmenopausal.     Past Medical History  Past Medical History:   Diagnosis Date    Dyslipidemia     Flatback syndrome     HTN (hypertension)     Obesity     VICKI (obstructive sleep apnea)     Polymyositis (H)     Prediabetes     Pulmonary embolism (H)     Restless legs syndrome (RLS)     Restrictive lung disease     Scoliosis        Past Surgical History  Past Surgical History:   Procedure Laterality Date    CYSTOSCOPY, INJECT COLLAGEN, COMBINED N/A 10/9/2023    Procedure: CYSTOSCOPY, WITH PERIURETHRAL BULKING AGENT INJECTION;  Surgeon: Ivonne Shaikh MD;  Location: [facility]    ORTHOPEDIC SURGERY      multiple back procedures, knee surgery, carpal tunnel       Prior to Admission Medications  Current Outpatient Medications   Medication Sig Dispense Refill    albuterol (PROAIR HFA/PROVENTIL HFA/VENTOLIN HFA) 108 (90 Base) MCG/ACT inhaler Inhale 2 puffs into the lungs every 6 hours as needed for shortness of breath / dyspnea or wheezing      alendronate (FOSAMAX) 70 MG tablet Take 70 mg by mouth once a week. Sunday Last dose 6/8/25      amLODIPine (NORVASC) 10 MG tablet Take 10 mg by mouth every morning.      aspirin (ASA) 81 MG chewable tablet Take 81 mg by mouth every evening      Calcium-Vitamin D-Vitamin K 500-200-40 MG-UNT-MCG CHEW Take 1 chew tab by mouth every morning. 650 Ca - 500 D - 40 K      cholecalciferol 50 MCG (2000 UT) tablet Take 2,000 Units by mouth every morning.      DULoxetine (CYMBALTA) 30 MG capsule Take 30 mg by mouth every morning.      fexofenadine (ALLEGRA) 180 MG tablet Take 180 mg by mouth as needed.      fluticasone (FLONASE) 50 MCG/ACT nasal spray Spray 1 spray in nostril daily as needed for rhinitis      fluticasone-salmeterol (ADVAIR) 250-50 MCG/DOSE inhaler Inhale 1 puff into the lungs 2 times daily      furosemide (LASIX) 20 MG tablet Take 20 mg by mouth daily as needed (ankle  swelling)      guaiFENesin-codeine (ROBITUSSIN AC) 100-10 MG/5ML solution Take by mouth every 6 hours as needed.      icosapent ethyl (VASCEPA) 1 g CAPS capsule Take 2 g by mouth 2 times daily (with meals).      inclisiran (LEQVIO) 284 MG/1.5ML SOSY SQ injection Inject 284 mg subcutaneously every 6 months. Last dose March-April 2025      ipratropium - albuterol 0.5 mg/2.5 mg/3 mL (DUONEB) 0.5-2.5 (3) MG/3ML neb solution Inhale 3 mLs into the lungs every 4 hours as needed for shortness of breath      loratadine (CLARITIN) 10 MG tablet Take 10 mg by mouth every morning.      multivitamin w/minerals (MULTI-VITAMIN) tablet Take 1 tablet by mouth daily Uses here and there      primidone (MYSOLINE) 50 MG tablet Take 50 mg by mouth 2 times daily.      propranolol ER (INDERAL LA) 80 MG 24 hr capsule Take 80 mg by mouth 2 times daily.      zinc sulfate (ZINCATE) 220 (50 Zn) MG capsule Take 220 mg by mouth daily      econazole nitrate 1 % external cream APPLY CREAM TOPICALLY TO AFFECTED AREA ONCE DAILY      estradiol (ESTRACE) 0.1 MG/GM vaginal cream Place 1 g vaginally three times a week 42.5 g 11    fluocinonide (LIDEX) 0.05 % external solution Apply topically daily as needed (scalp)      metroNIDAZOLE (FLAGYL) 500 MG tablet Take 1 tablet (500 mg) by mouth every 6 hours. At 8:00 am, 2:00 pm, 8:00 pm the day prior to your surgery with neomycin and zofran. 3 tablet 0    mometasone (ELOCON) 0.1 % external solution Apply few drops to areas of itching on scalp nightly as needed for up to 2 weeks and then sparingly thereafter.      mupirocin (BACTROBAN) 2 % external ointment Apply once daily to skin around nail, tip of nail and under nail x 4 weeks.      neomycin (MYCIFRADIN) 500 MG tablet Take 2 tablets (1,000 mg) by mouth every 6 hours. At 8:00 am, 2:00 pm, 8:00 pm the day prior to your surgery with flagyl and zofran. 6 tablet 0    nitrous oxide/oxygen 50/50 blend 1 application. by inhalation route as directed. 3 lpm continuous       omeprazole (PRILOSEC) 40 MG DR capsule Take 40 mg by mouth.      ondansetron (ZOFRAN) 4 MG tablet Take 1 tablet (4 mg) by mouth every 6 hours. At 8:00 am, 2:00 pm, 8:00 pm the day prior to your surgery with neomycin and flagyl. 3 tablet 0    polyethylene glycol (MIRALAX) 17 GM/Dose powder Please take 238 grams mixed with 64 oz of Gatorade at 4pm the night before surgery 238 g 0       Allergies  Allergies   Allergen Reactions    Latex Hives    Potassium Shortness Of Breath, Palpitations, Angioedema and Anaphylaxis    Penicillin G Swelling    Statins Muscle Pain (Myalgia)     Statin induced myopathy         Social History  Social History     Socioeconomic History    Marital status:      Spouse name: Not on file    Number of children: Not on file    Years of education: Not on file    Highest education level: Not on file   Occupational History    Not on file   Tobacco Use    Smoking status: Former     Current packs/day: 0.00     Types: Cigarettes     Quit date:      Years since quittin.4     Passive exposure: Past    Smokeless tobacco: Never   Substance and Sexual Activity    Alcohol use: Never    Drug use: Never    Sexual activity: Not on file   Other Topics Concern    Not on file   Social History Narrative    Not on file     Social Drivers of Health     Financial Resource Strain: Not on File (2023)    Received from [facility]    Financial Resource Strain     Financial Resource Strain: 0   Food Insecurity: Not on File (2024)    Received from [facility]    Food Insecurity     Food: 0   Transportation Needs: Not on File (2023)    Received from [facility]    Transportation Needs     Transportation: 0   Physical Activity: Not on File (2023)    Received from [facility]    Physical Activity     Physical Activity: 0   Stress: Not on File (2023)    Received from [facility]    Stress     Stress: 0   Social Connections: Not on File (10/8/2024)    Received from [facility]    Social Connections      Connectedness: 0   Interpersonal Safety: Unknown (3/25/2025)    [facility] from [facility]    Intimate Partner Violence     Are you in a relationship where you are physically hurt, threatened and/or made to feel afraid?: Unable to assess   Housing Stability: Not on File (2023)    Received from [employer]    Housing Stability     Housin       Family History  Family History   Problem Relation Age of Onset    Anesthesia Reaction No family hx of     Deep Vein Thrombosis (DVT) No family hx of        Review of Systems  The complete review of systems is negative other than noted in the HPI or here.   Anesthesia Evaluation   Pt has had prior anesthetic.     No history of anesthetic complications       ROS/MED HX  ENT/Pulmonary: Comment: restrictive lung disease  Using 3 L O2 with activity    (+) sleep apnea, uses CPAP,                                   (-) tobacco use   Neurologic:    (-) no seizures and no CVA   Cardiovascular:     (+) Dyslipidemia hypertension- -   -  - -   Taking blood thinners                              Previous cardiac testing   Echo: Date: 2024 Results:  Summary:    * The left ventricle is normal in size.     * The estimated ejection fraction is 55-60%.     * The left ventricular diastolic function is abnormal (Grade I).     * Normal right ventricular systolic function.     * There is no evidence of ASD/PFO by color Doppler.     * The microbubble contrast study is negative.     * No pulmonary hypertension, estimated pulmonary arterial systolic   pressure   is 12 mmHg.     * The IVC is normal in size (< 2.1 cm), > 50% respiratory variance, RA   pressure normal at 3 mmHg.     * Prior study from 2022.     * Compared to prior study, there is no significant change.       Stress Test:  Date: Results:    ECG Reviewed:  Date: 3/2025 Results:  Normal sinus rhythm   Nonspecific ST abnormality   Abnormal ECG   When compared with ECG of 2023 12:40,   Premature ventricular  "complexes are no longer Present   Cath:  Date: Results:      METS/Exercise Tolerance: 3 - Able to walk 1-2 blocks without stopping Comment: Can walk about a block with walker before stopping due to leg pain. Some DEVINE. Denies CP.    Hematologic:     (+) History of blood clots,    pt is not anticoagulated,  history of blood transfusion, no previous transfusion reaction,        Musculoskeletal: Comment: Autoimmune necrotizing myopathy  Scoliosis/ flatback syndrome      GI/Hepatic: Comment: Bowel incontinence    (+) GERD, Asymptomatic on medication,                  Renal/Genitourinary: Comment: Intrinsic sphincter deficiency  Bladder incontinence       Endo:     (+)               Obesity (BMI 33),    (-) chronic steroid usage   Psychiatric/Substance Use:  - neg psychiatric ROS     Infectious Disease:  - neg infectious disease ROS     Malignancy:       Other:            /78 (BP Location: Right arm, Patient Position: Sitting, Cuff Size: Adult Large)   Pulse 72   Temp 97.7  F (36.5  C) (Oral)   Ht 1.6 m (5' 3\")   Wt 96.6 kg (213 lb)   SpO2 95%   BMI 37.73 kg/m      Physical Exam   Constitutional: Awake, alert, cooperative, no apparent distress, and appears stated age.  Eyes: Pupils equal, round and reactive to light, extra ocular muscles intact, sclera clear, conjunctiva normal.  HENT: Normocephalic, oral pharynx with moist mucus membranes,partial dentures.    Respiratory: Clear to auscultation bilaterally. Wearing 3 L O2.   Cardiovascular: Regular rate and rhythm, normal S1 and S2, and no murmur noted.  Carotids no bruits. No edema. Palpable pulses to radial arteries.   GI: Normal bowel sounds, soft, non-distended, non-tender  Genitourinary:  deferred  Skin: Warm and dry.    Musculoskeletal: limited ROM of neck. There is no redness, warmth, or swelling of the exposed joints.  Neurologic: Awake, alert, oriented to name, place and time. Cranial nerves II-XII are grossly intact. Gait is normal. "   Neuropsychiatric: Calm, cooperative. Normal affect.     Prior Labs/Diagnostic Studies   All labs and imaging pertinent to the visit personally reviewed     EKG/ stress test - if available please see in ROS above   No results found.      Latest Ref Rng & Units 5/29/2025    12:59 PM   PFT   FVC L 1.35    FEV1 L 0.99    FVC% % 52    FEV1% % 49          The patient's records and results pertinent to the visit personally reviewed by this provider.     Outside records reviewed from: Care Everywhere    LAB/DIAGNOSTIC STUDIES TODAY:  multiple labs ordered per surgery teams, I have added a T&S    Assessment    Jenna Jhaveri is a 75 year old female seen as a PAC referral for risk assessment and optimization for anesthesia.    Plan/Recommendations  Pt will be optimized for the proposed procedure.  See below for details on the assessment, risk, and preoperative recommendations    NEUROLOGY  - No history of TIA, CVA or seizure  -h/o scoliosis/ flatback syndrome s/p mulitple previous procedures.   -h/o spinal cord injury, now with neurogenic bladder and bowel  -Post Op delirium risk factors:  High co-morbid index    ENT  - No current airway concerns.  Will need to be reassessed day of surgery.  Mallampati: II  TM: > 3  -last airway:  Easy, W/ oral airway; Direct laryngoscopy, Cricoid pressure; Straight; Oral; 02/14/23; 0824 (created via procedure documentation); Cuffed; 7 mm; Yes; Mac; 3; 1; 1; End tidal CO2, Auscultation, Cuff palpation, Symmetrical chest wall movement; Pharynx clear, Atraumatic, Dentition unchanged; 21; Lips; Soft (placed by neuromonitoring); 0 (not difficult); 02/14/23; 1031     CARDIAC  -hypertension using norvasc, propranolol, lasix  -dyslipidemia using inclisiran. Follows with cardiology for lipid management. Does not tolerate statins.   -previous cardiac testing above.   - METS (Metabolic Equivalents)  Patient CANNOT perform 4 METS exercise without symptoms             Total Score: 1    Functional  "Capacity: Unable to complete 4 METS      RCRI-Low risk: Class 2 0.9% complication rate             Total Score: 1    RCRI: High Risk Surgery        PULMONARY  -bronchomalacia and extrinsic compression of right sided airways. Largely due to spine and aorta. She was evaluated by Dr. Resendiz in preparation for the above procedure with the following recommendations:  \"As for her preoperative planning, this malacia should be easily handled by positive pressure ventilation.  In PACU recovery she should receive the same cares as other frail patients with chest wall deformity and long history of tobacco use - proactive mobilization and pulmonary hygiene.      [place] was disappointed that I thought it was unlikely we could help. We will follow up with her after multi-disciplinary discussion.\"  -I had a discussion with Dr. Resendiz. He reiterated that her malacia should be able to be handled by positive pressure ventilation. Consider BiPAP or CPAP in PACU. Have her upright as soon as possible postop. Early mobilization as tolerated. He will also put in a note for team DOS.   -using 3L O2 day and night, will take off while at rest during the day   -last PFTs 5/29/25: IMPRESSION: Moderate Restriction. There is no significant bronchodilator response.  Severe diffusion defect.   -continue inhalers   - Tobacco History    History   Smoking Status    Former    Types: Cigarettes   Smokeless Tobacco    Never       GI  - GERD  Controlled on medications: Proton Pump Inhibitor  -neurogenic bowel with the above procedure planned   PONV High Risk  Total Score: 3           1 AN PONV: Pt is Female    1 AN PONV: Patient is not a current smoker    1 AN PONV: Intended Post Op Opioids        /RENAL  - Baseline Creatinine WNL  -neurogenic bladder with the above procedure planned     ENDOCRINE    - BMI: Estimated body mass index is 37.73 kg/m  as calculated from the following:    Height as of this encounter: 1.6 m (5' 3\").    Weight as of this " encounter: 96.6 kg (213 lb).  Obesity (BMI >30)  - No history of Diabetes Mellitus    HEME  VTE Low Risk 0.5%             Total Score: 4    VTE: Greater than 59 yrs old    VTE: Pt history of VTE      - Platelet dysfunction second to Aspirin (Brionna, many others)  A type and screen has been ordered for this patient    MSK  Patient is NOT Frail             Total Score: 0      -PM&R referral not indicated     Different anesthesia methods/types have been discussed with the patient, but they are aware that the final plan will be decided by the assigned anesthesia provider on the date of service.  Patient was discussed with Dr Drew    The patient is optimized for their procedure. AVS with information on surgery time/arrival time, meds and NPO status given by nursing staff. No further diagnostic testing indicated.    70 minutes were spent on the date of the encounter performing chart review, history and exam, documentation and/or discussion with other providers about the issues documented above.    Ondina Michel PA-C  Preoperative Assessment Center  [facility]  [facility]  Phone: [phone]  Fax: [phone]

## 2025-06-12 ENCOUNTER — TEAM CONFERENCE (OUTPATIENT)
Dept: SURGERY | Facility: CLINIC | Age: 75
End: 2025-06-12
Payer: COMMERCIAL

## 2025-06-12 LAB
BACTERIA UR CULT: ABNORMAL
BACTERIA UR CULT: ABNORMAL

## 2025-06-12 NOTE — CONFIDENTIAL NOTE
COLON AND RECTAL SURGERY HUDDLE:    Patient was reviewed in preparation for their surgery the following was reviewed and has been completed:    Surgeon: Dr. Og Lion    Surgery & Date: 6/25/25  LAPAROSCOPIC, POSSIBLE OPEN, CREATION COLOSTOMY      Last MD Note: reviewed  1. I discussed at length options, and the most effective longitudinal option would be a colostomy, which comes at significant risk given her medical comorbidities, specifically her lung disease. However, given the impact on her quality of life, I think it is reasonable to pursue.  2. We will reconvene after she is stented by pulmonology and see how she is doing.     Surgery Planned: joint case with Dr. Hillman for SPT, and my part will be a laparoscopic possible open end colostomy  Time needed: 120 minutes     Preoperative labs: CBC, CMP, PTT/INR, Prealbumin.  Mechanical bowel prep with oral antibiotics.  Ostomy marking with WOCN.  Hold these medications prior to surgery: none specific to surgery  Advised patient to begin protein shakes: Premier or Pure protein given high protein, low carb ratio for pre-operative rehab.      Anesthesia Type: General    Other Providers: Yes- Dr. Hillman    PAC: Yes    WOC: Yes    Labs: Yes    Bowel Prep: Yes MiraLAX / Gatorade , Antibiotic, and Magnesium Citrate    Packet: Yes    Imaging: No    Post-Op Appointments: Yes    Pre op soap: Yes Questions about shower: no    Is patient on TPN?: No   If yes, I contacted the TPN pharmacist by paging the  pharmacy at [phone] or calling [phone]. I also contacted Tanika COCHRAN with inpatient colon and rectal team.     Pre op call with education complete: Yes Reminded patient to bring handout to hospital N/A

## 2025-06-14 ENCOUNTER — HEALTH MAINTENANCE LETTER (OUTPATIENT)
Age: 75
End: 2025-06-14

## 2025-06-16 DIAGNOSIS — N39.0 RECURRENT UTI: Primary | ICD-10-CM

## 2025-06-16 RX ORDER — NITROFURANTOIN 25; 75 MG/1; MG/1
100 CAPSULE ORAL 2 TIMES DAILY
Qty: 6 CAPSULE | Refills: 0 | Status: SHIPPED | OUTPATIENT
Start: 2025-06-22 | End: 2025-06-25

## 2025-06-25 ENCOUNTER — ANESTHESIA (OUTPATIENT)
Dept: SURGERY | Facility: CLINIC | Age: 75
End: 2025-06-25
Payer: COMMERCIAL

## 2025-06-25 ENCOUNTER — HOSPITAL ENCOUNTER (INPATIENT)
Facility: CLINIC | Age: 75
End: 2025-06-25
Attending: UROLOGY | Admitting: SURGERY
Payer: COMMERCIAL

## 2025-06-25 PROBLEM — K59.2 CONSTIPATION DUE TO NEUROGENIC BOWEL: Status: ACTIVE | Noted: 2025-06-25

## 2025-06-25 PROBLEM — K59.00 CONSTIPATION DUE TO NEUROGENIC BOWEL: Status: ACTIVE | Noted: 2025-06-25

## 2025-06-25 LAB — GLUCOSE BLDC GLUCOMTR-MCNC: 132 MG/DL (ref 70–99)

## 2025-06-25 PROCEDURE — 370N000017 HC ANESTHESIA TECHNICAL FEE, PER MIN: Performed by: UROLOGY

## 2025-06-25 PROCEDURE — 710N000010 HC RECOVERY PHASE 1, LEVEL 2, PER MIN: Performed by: UROLOGY

## 2025-06-25 PROCEDURE — 250N000025 HC SEVOFLURANE, PER MIN: Performed by: UROLOGY

## 2025-06-25 PROCEDURE — 360N000077 HC SURGERY LEVEL 4, PER MIN: Performed by: UROLOGY

## 2025-06-25 PROCEDURE — 250N000009 HC RX 250: Performed by: REGISTERED NURSE

## 2025-06-25 PROCEDURE — 120N000002 HC R&B MED SURG/OB UMMC

## 2025-06-25 PROCEDURE — 250N000009 HC RX 250: Performed by: NURSE ANESTHETIST, CERTIFIED REGISTERED

## 2025-06-25 PROCEDURE — 258N000003 HC RX IP 258 OP 636: Performed by: REGISTERED NURSE

## 2025-06-25 PROCEDURE — 250N000011 HC RX IP 250 OP 636: Performed by: REGISTERED NURSE

## 2025-06-25 PROCEDURE — 250N000013 HC RX MED GY IP 250 OP 250 PS 637

## 2025-06-25 PROCEDURE — 250N000011 HC RX IP 250 OP 636: Performed by: STUDENT IN AN ORGANIZED HEALTH CARE EDUCATION/TRAINING PROGRAM

## 2025-06-25 PROCEDURE — 272N000001 HC OR GENERAL SUPPLY STERILE: Performed by: UROLOGY

## 2025-06-25 PROCEDURE — C2627 CATH, SUPRAPUBIC/CYSTOSCOPIC: HCPCS | Performed by: UROLOGY

## 2025-06-25 PROCEDURE — 250N000013 HC RX MED GY IP 250 OP 250 PS 637: Performed by: SURGERY

## 2025-06-25 PROCEDURE — 258N000003 HC RX IP 258 OP 636: Performed by: SURGERY

## 2025-06-25 PROCEDURE — 250N000013 HC RX MED GY IP 250 OP 250 PS 637: Performed by: STUDENT IN AN ORGANIZED HEALTH CARE EDUCATION/TRAINING PROGRAM

## 2025-06-25 PROCEDURE — 250N000011 HC RX IP 250 OP 636: Performed by: SURGERY

## 2025-06-25 PROCEDURE — 250N000011 HC RX IP 250 OP 636: Performed by: NURSE ANESTHETIST, CERTIFIED REGISTERED

## 2025-06-25 PROCEDURE — 999N000141 HC STATISTIC PRE-PROCEDURE NURSING ASSESSMENT: Performed by: UROLOGY

## 2025-06-25 RX ORDER — METHOCARBAMOL 500 MG/1
500 TABLET, FILM COATED ORAL 4 TIMES DAILY
Status: DISPENSED | OUTPATIENT
Start: 2025-06-25

## 2025-06-25 RX ORDER — DULOXETIN HYDROCHLORIDE 30 MG/1
30 CAPSULE, DELAYED RELEASE ORAL EVERY MORNING
Status: DISPENSED | OUTPATIENT
Start: 2025-06-26

## 2025-06-25 RX ORDER — METRONIDAZOLE 500 MG/100ML
500 INJECTION, SOLUTION INTRAVENOUS
Status: COMPLETED | OUTPATIENT
Start: 2025-06-25 | End: 2025-06-25

## 2025-06-25 RX ORDER — GABAPENTIN 300 MG/1
300 CAPSULE ORAL
Status: COMPLETED | OUTPATIENT
Start: 2025-06-25 | End: 2025-06-25

## 2025-06-25 RX ORDER — SODIUM CHLORIDE, SODIUM LACTATE, POTASSIUM CHLORIDE, CALCIUM CHLORIDE 600; 310; 30; 20 MG/100ML; MG/100ML; MG/100ML; MG/100ML
INJECTION, SOLUTION INTRAVENOUS CONTINUOUS PRN
Status: DISCONTINUED | OUTPATIENT
Start: 2025-06-25 | End: 2025-06-25

## 2025-06-25 RX ORDER — PANTOPRAZOLE SODIUM 40 MG/1
40 TABLET, DELAYED RELEASE ORAL 2 TIMES DAILY
Status: DISPENSED | OUTPATIENT
Start: 2025-06-25

## 2025-06-25 RX ORDER — NALOXONE HYDROCHLORIDE 0.4 MG/ML
0.4 INJECTION, SOLUTION INTRAMUSCULAR; INTRAVENOUS; SUBCUTANEOUS
Status: ACTIVE | OUTPATIENT
Start: 2025-06-25

## 2025-06-25 RX ORDER — ONDANSETRON 4 MG/1
4 TABLET, ORALLY DISINTEGRATING ORAL EVERY 6 HOURS PRN
Status: ACTIVE | OUTPATIENT
Start: 2025-06-25

## 2025-06-25 RX ORDER — ENOXAPARIN SODIUM 100 MG/ML
40 INJECTION SUBCUTANEOUS
Status: COMPLETED | OUTPATIENT
Start: 2025-06-25 | End: 2025-06-25

## 2025-06-25 RX ORDER — PROPOFOL 10 MG/ML
INJECTION, EMULSION INTRAVENOUS PRN
Status: DISCONTINUED | OUTPATIENT
Start: 2025-06-25 | End: 2025-06-25

## 2025-06-25 RX ORDER — HYDROMORPHONE HCL IN WATER/PF 6 MG/30 ML
0.2 PATIENT CONTROLLED ANALGESIA SYRINGE INTRAVENOUS EVERY 5 MIN PRN
Status: DISCONTINUED | OUTPATIENT
Start: 2025-06-25 | End: 2025-06-25 | Stop reason: HOSPADM

## 2025-06-25 RX ORDER — ACETAMINOPHEN 325 MG/1
975 TABLET ORAL ONCE
Status: COMPLETED | OUTPATIENT
Start: 2025-06-25 | End: 2025-06-25

## 2025-06-25 RX ORDER — IBUPROFEN 600 MG/1
600 TABLET, FILM COATED ORAL EVERY 6 HOURS
Status: DISPENSED | OUTPATIENT
Start: 2025-06-26

## 2025-06-25 RX ORDER — FENTANYL CITRATE 50 UG/ML
25 INJECTION, SOLUTION INTRAMUSCULAR; INTRAVENOUS EVERY 5 MIN PRN
Status: DISCONTINUED | OUTPATIENT
Start: 2025-06-25 | End: 2025-06-25 | Stop reason: HOSPADM

## 2025-06-25 RX ORDER — PROCHLORPERAZINE MALEATE 5 MG/1
5 TABLET ORAL EVERY 6 HOURS PRN
Status: ACTIVE | OUTPATIENT
Start: 2025-06-25

## 2025-06-25 RX ORDER — SODIUM CHLORIDE 9 MG/ML
INJECTION, SOLUTION INTRAVENOUS CONTINUOUS
Status: DISCONTINUED | OUTPATIENT
Start: 2025-06-25 | End: 2025-06-26

## 2025-06-25 RX ORDER — OXYCODONE HYDROCHLORIDE 10 MG/1
10 TABLET ORAL EVERY 4 HOURS PRN
Status: ACTIVE | OUTPATIENT
Start: 2025-06-25

## 2025-06-25 RX ORDER — CEFAZOLIN SODIUM/WATER 2 G/20 ML
2 SYRINGE (ML) INTRAVENOUS SEE ADMIN INSTRUCTIONS
Status: DISCONTINUED | OUTPATIENT
Start: 2025-06-25 | End: 2025-06-25

## 2025-06-25 RX ORDER — LIDOCAINE 40 MG/G
CREAM TOPICAL
Status: DISCONTINUED | OUTPATIENT
Start: 2025-06-25 | End: 2025-06-25

## 2025-06-25 RX ORDER — FENTANYL CITRATE 50 UG/ML
INJECTION, SOLUTION INTRAMUSCULAR; INTRAVENOUS PRN
Status: DISCONTINUED | OUTPATIENT
Start: 2025-06-25 | End: 2025-06-25

## 2025-06-25 RX ORDER — DEXAMETHASONE SODIUM PHOSPHATE 4 MG/ML
4 INJECTION, SOLUTION INTRA-ARTICULAR; INTRALESIONAL; INTRAMUSCULAR; INTRAVENOUS; SOFT TISSUE
Status: DISCONTINUED | OUTPATIENT
Start: 2025-06-25 | End: 2025-06-25 | Stop reason: HOSPADM

## 2025-06-25 RX ORDER — NALOXONE HYDROCHLORIDE 0.4 MG/ML
0.2 INJECTION, SOLUTION INTRAMUSCULAR; INTRAVENOUS; SUBCUTANEOUS
Status: ACTIVE | OUTPATIENT
Start: 2025-06-25

## 2025-06-25 RX ORDER — KETOROLAC TROMETHAMINE 30 MG/ML
15 INJECTION, SOLUTION INTRAMUSCULAR; INTRAVENOUS
Status: DISCONTINUED | OUTPATIENT
Start: 2025-06-25 | End: 2025-06-25 | Stop reason: HOSPADM

## 2025-06-25 RX ORDER — HYDROMORPHONE HCL IN WATER/PF 6 MG/30 ML
0.4 PATIENT CONTROLLED ANALGESIA SYRINGE INTRAVENOUS
Status: ACTIVE | OUTPATIENT
Start: 2025-06-25

## 2025-06-25 RX ORDER — GABAPENTIN 100 MG/1
100 CAPSULE ORAL AT BEDTIME
Status: DISPENSED | OUTPATIENT
Start: 2025-06-25

## 2025-06-25 RX ORDER — AMLODIPINE BESYLATE 10 MG/1
10 TABLET ORAL EVERY MORNING
Status: DISPENSED | OUTPATIENT
Start: 2025-06-26

## 2025-06-25 RX ORDER — PROPRANOLOL HYDROCHLORIDE 80 MG/1
80 CAPSULE, EXTENDED RELEASE ORAL 2 TIMES DAILY
Status: DISPENSED | OUTPATIENT
Start: 2025-06-25

## 2025-06-25 RX ORDER — ONDANSETRON 4 MG/1
4 TABLET, ORALLY DISINTEGRATING ORAL EVERY 30 MIN PRN
Status: DISCONTINUED | OUTPATIENT
Start: 2025-06-25 | End: 2025-06-25 | Stop reason: HOSPADM

## 2025-06-25 RX ORDER — FLUTICASONE PROPIONATE 50 MCG
1 SPRAY, SUSPENSION (ML) NASAL DAILY PRN
Status: ACTIVE | OUTPATIENT
Start: 2025-06-25

## 2025-06-25 RX ORDER — ONDANSETRON 2 MG/ML
INJECTION INTRAMUSCULAR; INTRAVENOUS PRN
Status: DISCONTINUED | OUTPATIENT
Start: 2025-06-25 | End: 2025-06-25

## 2025-06-25 RX ORDER — ENOXAPARIN SODIUM 100 MG/ML
40 INJECTION SUBCUTANEOUS EVERY 24 HOURS
Status: DISPENSED | OUTPATIENT
Start: 2025-06-26

## 2025-06-25 RX ORDER — CEFAZOLIN SODIUM/WATER 2 G/20 ML
2 SYRINGE (ML) INTRAVENOUS
Status: DISCONTINUED | OUTPATIENT
Start: 2025-06-25 | End: 2025-06-25

## 2025-06-25 RX ORDER — IPRATROPIUM BROMIDE AND ALBUTEROL SULFATE 2.5; .5 MG/3ML; MG/3ML
3 SOLUTION RESPIRATORY (INHALATION) EVERY 4 HOURS PRN
Status: ACTIVE | OUTPATIENT
Start: 2025-06-25

## 2025-06-25 RX ORDER — HYDROMORPHONE HCL IN WATER/PF 6 MG/30 ML
0.4 PATIENT CONTROLLED ANALGESIA SYRINGE INTRAVENOUS EVERY 5 MIN PRN
Status: DISCONTINUED | OUTPATIENT
Start: 2025-06-25 | End: 2025-06-25 | Stop reason: HOSPADM

## 2025-06-25 RX ORDER — ONDANSETRON 2 MG/ML
4 INJECTION INTRAMUSCULAR; INTRAVENOUS EVERY 30 MIN PRN
Status: DISCONTINUED | OUTPATIENT
Start: 2025-06-25 | End: 2025-06-25 | Stop reason: HOSPADM

## 2025-06-25 RX ORDER — KETOROLAC TROMETHAMINE 30 MG/ML
15 INJECTION, SOLUTION INTRAMUSCULAR; INTRAVENOUS EVERY 6 HOURS
Status: COMPLETED | OUTPATIENT
Start: 2025-06-25 | End: 2025-06-26

## 2025-06-25 RX ORDER — OXYCODONE HYDROCHLORIDE 5 MG/1
5 TABLET ORAL EVERY 4 HOURS PRN
Status: DISPENSED | OUTPATIENT
Start: 2025-06-25

## 2025-06-25 RX ORDER — DIMENHYDRINATE 50 MG/ML
25 INJECTION, SOLUTION INTRAMUSCULAR; INTRAVENOUS
Status: DISCONTINUED | OUTPATIENT
Start: 2025-06-25 | End: 2025-06-25 | Stop reason: HOSPADM

## 2025-06-25 RX ORDER — ALBUTEROL SULFATE 90 UG/1
2 INHALANT RESPIRATORY (INHALATION) EVERY 6 HOURS PRN
Status: DISPENSED | OUTPATIENT
Start: 2025-06-25

## 2025-06-25 RX ORDER — ONDANSETRON 2 MG/ML
4 INJECTION INTRAMUSCULAR; INTRAVENOUS ONCE
Status: DISCONTINUED | OUTPATIENT
Start: 2025-06-25 | End: 2025-06-25

## 2025-06-25 RX ORDER — ONDANSETRON 2 MG/ML
4 INJECTION INTRAMUSCULAR; INTRAVENOUS EVERY 6 HOURS PRN
Status: ACTIVE | OUTPATIENT
Start: 2025-06-25

## 2025-06-25 RX ORDER — HYDROMORPHONE HCL IN WATER/PF 6 MG/30 ML
0.2 PATIENT CONTROLLED ANALGESIA SYRINGE INTRAVENOUS
Status: ACTIVE | OUTPATIENT
Start: 2025-06-25

## 2025-06-25 RX ORDER — SODIUM CHLORIDE 9 MG/ML
INJECTION, SOLUTION INTRAVENOUS CONTINUOUS PRN
Status: DISCONTINUED | OUTPATIENT
Start: 2025-06-25 | End: 2025-06-25

## 2025-06-25 RX ORDER — LIDOCAINE 40 MG/G
CREAM TOPICAL
Status: ACTIVE | OUTPATIENT
Start: 2025-06-25

## 2025-06-25 RX ORDER — FENTANYL CITRATE 50 UG/ML
50 INJECTION, SOLUTION INTRAMUSCULAR; INTRAVENOUS EVERY 5 MIN PRN
Status: DISCONTINUED | OUTPATIENT
Start: 2025-06-25 | End: 2025-06-25 | Stop reason: HOSPADM

## 2025-06-25 RX ORDER — CARBOXYMETHYLCELLULOSE SODIUM 5 MG/ML
1 SOLUTION/ DROPS OPHTHALMIC
Status: ACTIVE | OUTPATIENT
Start: 2025-06-25

## 2025-06-25 RX ORDER — NALOXONE HYDROCHLORIDE 0.4 MG/ML
0.1 INJECTION, SOLUTION INTRAMUSCULAR; INTRAVENOUS; SUBCUTANEOUS
Status: DISCONTINUED | OUTPATIENT
Start: 2025-06-25 | End: 2025-06-25 | Stop reason: HOSPADM

## 2025-06-25 RX ORDER — DEXAMETHASONE SODIUM PHOSPHATE 4 MG/ML
INJECTION, SOLUTION INTRA-ARTICULAR; INTRALESIONAL; INTRAMUSCULAR; INTRAVENOUS; SOFT TISSUE PRN
Status: DISCONTINUED | OUTPATIENT
Start: 2025-06-25 | End: 2025-06-25

## 2025-06-25 RX ORDER — LIDOCAINE HYDROCHLORIDE 20 MG/ML
INJECTION, SOLUTION INFILTRATION; PERINEURAL PRN
Status: DISCONTINUED | OUTPATIENT
Start: 2025-06-25 | End: 2025-06-25

## 2025-06-25 RX ADMIN — PHENYLEPHRINE HYDROCHLORIDE 100 MCG: 10 INJECTION INTRAVENOUS at 07:48

## 2025-06-25 RX ADMIN — DEXAMETHASONE SODIUM PHOSPHATE 4 MG: 4 INJECTION, SOLUTION INTRAMUSCULAR; INTRAVENOUS at 07:49

## 2025-06-25 RX ADMIN — SODIUM CHLORIDE: 0.9 INJECTION, SOLUTION INTRAVENOUS at 12:08

## 2025-06-25 RX ADMIN — PHENYLEPHRINE HYDROCHLORIDE 100 MCG: 10 INJECTION INTRAVENOUS at 10:58

## 2025-06-25 RX ADMIN — PROPRANOLOL HYDROCHLORIDE 80 MG: 80 CAPSULE, EXTENDED RELEASE ORAL at 21:47

## 2025-06-25 RX ADMIN — PHENYLEPHRINE HYDROCHLORIDE 100 MCG: 10 INJECTION INTRAVENOUS at 09:58

## 2025-06-25 RX ADMIN — PANTOPRAZOLE SODIUM 40 MG: 40 TABLET, DELAYED RELEASE ORAL at 20:32

## 2025-06-25 RX ADMIN — PHENYLEPHRINE HYDROCHLORIDE 100 MCG: 10 INJECTION INTRAVENOUS at 11:12

## 2025-06-25 RX ADMIN — SODIUM CHLORIDE: 9 INJECTION, SOLUTION INTRAVENOUS at 07:58

## 2025-06-25 RX ADMIN — FENTANYL CITRATE 50 MCG: 50 INJECTION INTRAMUSCULAR; INTRAVENOUS at 08:39

## 2025-06-25 RX ADMIN — ACETAMINOPHEN 975 MG: 325 TABLET ORAL at 06:38

## 2025-06-25 RX ADMIN — METRONIDAZOLE 500 MG: 500 INJECTION, SOLUTION INTRAVENOUS at 06:38

## 2025-06-25 RX ADMIN — FENTANYL CITRATE 50 MCG: 50 INJECTION, SOLUTION INTRAMUSCULAR; INTRAVENOUS at 12:14

## 2025-06-25 RX ADMIN — Medication 2 G: at 07:55

## 2025-06-25 RX ADMIN — Medication 100 MG: at 11:28

## 2025-06-25 RX ADMIN — Medication 20 MG: at 08:34

## 2025-06-25 RX ADMIN — MIDAZOLAM 1 MG: 1 INJECTION INTRAMUSCULAR; INTRAVENOUS at 07:33

## 2025-06-25 RX ADMIN — METHOCARBAMOL 500 MG: 500 TABLET ORAL at 16:11

## 2025-06-25 RX ADMIN — GABAPENTIN 100 MG: 100 CAPSULE ORAL at 21:40

## 2025-06-25 RX ADMIN — KETOROLAC TROMETHAMINE 15 MG: 30 INJECTION, SOLUTION INTRAMUSCULAR at 21:40

## 2025-06-25 RX ADMIN — Medication 10 MG: at 09:55

## 2025-06-25 RX ADMIN — PHENYLEPHRINE HYDROCHLORIDE 100 MCG: 10 INJECTION INTRAVENOUS at 08:21

## 2025-06-25 RX ADMIN — PHENYLEPHRINE HYDROCHLORIDE 150 MCG: 10 INJECTION INTRAVENOUS at 08:00

## 2025-06-25 RX ADMIN — ACETAMINOPHEN 975 MG: 325 TABLET ORAL at 14:07

## 2025-06-25 RX ADMIN — Medication 50 MG: at 07:46

## 2025-06-25 RX ADMIN — LIDOCAINE HYDROCHLORIDE 40 MG: 20 INJECTION, SOLUTION INFILTRATION; PERINEURAL at 07:45

## 2025-06-25 RX ADMIN — Medication 20 MG: at 09:14

## 2025-06-25 RX ADMIN — Medication 100 MG: at 11:23

## 2025-06-25 RX ADMIN — SODIUM CHLORIDE, SODIUM LACTATE, POTASSIUM CHLORIDE, AND CALCIUM CHLORIDE: .6; .31; .03; .02 INJECTION, SOLUTION INTRAVENOUS at 07:35

## 2025-06-25 RX ADMIN — PROPOFOL 100 MG: 10 INJECTION, EMULSION INTRAVENOUS at 07:45

## 2025-06-25 RX ADMIN — ENOXAPARIN SODIUM 40 MG: 40 INJECTION SUBCUTANEOUS at 06:38

## 2025-06-25 RX ADMIN — METHOCARBAMOL 500 MG: 500 TABLET ORAL at 20:32

## 2025-06-25 RX ADMIN — FENTANYL CITRATE 50 MCG: 50 INJECTION INTRAMUSCULAR; INTRAVENOUS at 07:44

## 2025-06-25 RX ADMIN — ONDANSETRON 4 MG: 2 INJECTION INTRAMUSCULAR; INTRAVENOUS at 10:13

## 2025-06-25 RX ADMIN — PHENYLEPHRINE HYDROCHLORIDE 100 MCG: 10 INJECTION INTRAVENOUS at 09:34

## 2025-06-25 RX ADMIN — KETOROLAC TROMETHAMINE 15 MG: 30 INJECTION, SOLUTION INTRAMUSCULAR at 16:11

## 2025-06-25 RX ADMIN — GABAPENTIN 300 MG: 300 CAPSULE ORAL at 06:38

## 2025-06-25 RX ADMIN — PHENYLEPHRINE HYDROCHLORIDE 150 MCG: 10 INJECTION INTRAVENOUS at 07:52

## 2025-06-25 ASSESSMENT — ACTIVITIES OF DAILY LIVING (ADL)
ADLS_ACUITY_SCORE: 40
ADLS_ACUITY_SCORE: 40
ADLS_ACUITY_SCORE: 38
ADLS_ACUITY_SCORE: 27
ADLS_ACUITY_SCORE: 42
ADLS_ACUITY_SCORE: 42
ADLS_ACUITY_SCORE: 27
ADLS_ACUITY_SCORE: 40
ADLS_ACUITY_SCORE: 27
ADLS_ACUITY_SCORE: 27
ADLS_ACUITY_SCORE: 42
ADLS_ACUITY_SCORE: 40
ADLS_ACUITY_SCORE: 27
ADLS_ACUITY_SCORE: 26
ADLS_ACUITY_SCORE: 38
ADLS_ACUITY_SCORE: 26
ADLS_ACUITY_SCORE: 27
ADLS_ACUITY_SCORE: 42

## 2025-06-25 ASSESSMENT — LIFESTYLE VARIABLES: TOBACCO_USE: 0

## 2025-06-25 ASSESSMENT — ENCOUNTER SYMPTOMS: SEIZURES: 0

## 2025-06-25 NOTE — ANESTHESIA PROCEDURE NOTES
Airway       Patient location during procedure: OR       Procedure Start/Stop Times: 6/25/2025 7:48 AM  Staff -        Anesthesiologist:  Nir Huntley MD       CRNA: Asha Ness APRN CRNA       Performed By: CRNA  Consent for Airway        Urgency: elective  Indications and Patient Condition       Indications for airway management: martita-procedural       Induction type:intravenous       Mask difficulty assessment: 2 - vent by mask + OA or adjuvant +/- NMBA    Final Airway Details       Final airway type: endotracheal airway       Successful airway: ETT - single and Oral  Endotracheal Airway Details        ETT size (mm): 7.0       Cuffed: yes       Cuff volume (mL): 6       Successful intubation technique: direct laryngoscopy       DL Blade Type: Alvarez 2       Grade View of Cords: 1       Adjucts: stylet       Position: Right       Measured from: lips       Secured at (cm): 20       Bite block used: None    Post intubation assessment        Placement verified by: capnometry, equal breath sounds and chest rise        Number of attempts at approach: 1       Number of other approaches attempted: 0       Secured with: tape       Ease of procedure: easy       Dentition: Intact and Unchanged    Medication(s) Administered   Medication Administration Time: 6/25/2025 7:48 AM

## 2025-06-25 NOTE — PLAN OF CARE
"/61 (BP Location: Left arm)   Pulse 77   Temp 97.6  F (36.4  C) (Oral)   Resp 18   Ht 1.6 m (5' 3\")   Wt 95.8 kg (211 lb 3.2 oz)   SpO2 97%   BMI 37.41 kg/m      Care from: [id number]    VS & Pain: VSS, pain treated with scheduled meds  Neuro: A+Ox4  Respiratory: 3L NC baseline at home  Cardiac: WDL  Peripheral neurovascular: WDL  GI/: new colostomy and suprapubic cath; is passing gas from colostomy  Nutrition: FLD  Skin: 3 lap sites, colostomy, suprapublic cath  Lines: R PIC:SL; L PIV:NS 50ml/hr  Activity: SBA  Events this shift: transferred to  at 1545;  at bedside- attentive and helpful; tolerating intake well;     Plan:  continue POC; WOC to see tomorrow  "

## 2025-06-25 NOTE — ANESTHESIA PREPROCEDURE EVALUATION
Anesthesia Pre-Procedure Evaluation    Patient: Jenna Jhaveri   MRN: [id number] : 1950          Procedure : Procedure(s):  cystoscopy and suprapubic tube placement  LAPAROSCOPIC, POSSIBLE OPEN, CREATION COLOSTOMY  **Latex Allergy**         Past Medical History:   Diagnosis Date    Dyslipidemia     Flatback syndrome     HTN (hypertension)     Obesity     VICKI (obstructive sleep apnea)     Polymyositis (H)     Prediabetes     Pulmonary embolism (H)     Restless legs syndrome (RLS)     Restrictive lung disease     Scoliosis       Past Surgical History:   Procedure Laterality Date    CYSTOSCOPY, INJECT COLLAGEN, COMBINED N/A 10/9/2023    Procedure: CYSTOSCOPY, WITH PERIURETHRAL BULKING AGENT INJECTION;  Surgeon: Ivonne Shaikh MD;  Location: [facility] OR    ORTHOPEDIC SURGERY      multiple back procedures, knee surgery, carpal tunnel      Allergies   Allergen Reactions    Latex Hives    Potassium Shortness Of Breath, Palpitations, Angioedema and Anaphylaxis    Penicillin G Swelling    Statins Muscle Pain (Myalgia)     Statin induced myopathy        Social History     Tobacco Use    Smoking status: Former     Current packs/day: 0.00     Types: Cigarettes     Quit date:      Years since quittin.4     Passive exposure: Past    Smokeless tobacco: Never   Substance Use Topics    Alcohol use: Never      Wt Readings from Last 1 Encounters:   06/10/25 96.6 kg (213 lb)        Anesthesia Evaluation   Pt has had prior anesthetic.     No history of anesthetic complications       ROS/MED HX  ENT/Pulmonary: Comment: restrictive lung disease  Using 3 L O2 with activity    (+) sleep apnea, uses CPAP,                                   (-) tobacco use   Neurologic:    (-) no seizures and no CVA   Cardiovascular:     (+) Dyslipidemia hypertension- -   -  - -   Taking blood thinners                              Previous cardiac testing   Echo: Date: 2024 Results:  Summary:    * The left ventricle is normal in size.      * The estimated ejection fraction is 55-60%.     * The left ventricular diastolic function is abnormal (Grade I).     * Normal right ventricular systolic function.     * There is no evidence of ASD/PFO by color Doppler.     * The microbubble contrast study is negative.     * No pulmonary hypertension, estimated pulmonary arterial systolic   pressure   is 12 mmHg.     * The IVC is normal in size (< 2.1 cm), > 50% respiratory variance, RA   pressure normal at 3 mmHg.     * Prior study from 7/1/2022.     * Compared to prior study, there is no significant change.       Stress Test:  Date: Results:    ECG Reviewed:  Date: 3/2025 Results:  Normal sinus rhythm   Nonspecific ST abnormality   Abnormal ECG   When compared with ECG of 01-Feb-2023 12:40,   Premature ventricular complexes are no longer Present   Cath:  Date: Results:      METS/Exercise Tolerance: 3 - Able to walk 1-2 blocks without stopping Comment: Can walk about a block with walker before stopping due to leg pain. Some DEVINE. Denies CP.    Hematologic:     (+) History of blood clots,    pt is not anticoagulated,  history of blood transfusion, no previous transfusion reaction,        Musculoskeletal: Comment: Autoimmune necrotizing myopathy  Scoliosis/ flatback syndrome      GI/Hepatic: Comment: Bowel incontinence    (+) GERD, Asymptomatic on medication,                  Renal/Genitourinary: Comment: Intrinsic sphincter deficiency  Bladder incontinence       Endo:     (+)               Obesity (BMI 33),    (-) chronic steroid usage   Psychiatric/Substance Use:  - neg psychiatric ROS     Infectious Disease:  - neg infectious disease ROS     Malignancy:       Other:              Physical Exam  Airway  Mallampati: II  TM distance: >3 FB  Neck ROM: full  Upper bite lip test: I  Mouth opening: >= 4 cm    Cardiovascular - normal exam   Dental   (+) Completely normal teeth      Pulmonary - normal exam      Neurological   Other Findings       OUTSIDE LABS:  CBC:   Lab  "Results   Component Value Date    WBC 7.1 06/10/2025    WBC 6.1 04/01/2024    HGB 14.3 06/10/2025    HGB 14.2 04/01/2024    HCT 47.4 (H) 06/10/2025    HCT 45.4 04/01/2024     06/10/2025     04/01/2024     BMP:   Lab Results   Component Value Date     06/10/2025     09/18/2023    POTASSIUM 4.1 06/10/2025    POTASSIUM 4.4 09/18/2023    CHLORIDE 101 06/10/2025    CHLORIDE 101 09/18/2023    CO2 30 (H) 06/10/2025    CO2 27 09/18/2023    BUN 15.3 06/10/2025    BUN 17.4 09/18/2023    CR 0.61 06/10/2025    CR 0.56 09/18/2023    GLC 98 06/10/2025     (H) 09/18/2023     COAGS:   Lab Results   Component Value Date    PTT 33 06/10/2025    INR 0.96 06/10/2025     POC: No results found for: \"BGM\", \"HCG\", \"HCGS\"  HEPATIC:   Lab Results   Component Value Date    ALBUMIN 4.1 06/10/2025    PROTTOTAL 7.4 06/10/2025    ALT 15 06/10/2025    AST 18 06/10/2025    ALKPHOS 118 06/10/2025    BILITOTAL 0.2 06/10/2025     OTHER:   Lab Results   Component Value Date    DHARMESH 9.3 06/10/2025       Anesthesia Plan    ASA Status:  3      NPO Status: NPO Appropriate   Anesthesia Type: General.  Airway: oral.  Induction: intravenous.  Maintenance: Balanced.   Techniques and Equipment:       - Monitoring Plan: standard ASA monitoring, train of four monitoring, processed EEG monitor     Consents    Anesthesia Plan(s) and associated risks, benefits, and realistic alternatives discussed. Questions answered and patient/representative(s) expressed understanding.     - Discussed: anesthesiologist     - Discussed with:  Patient               Postoperative Care    Pain management: plan for postoperative opioid use.     Comments:                   Kath Dawn MD    I have reviewed the pertinent notes and labs in the chart from the past 30 days and (re)examined the patient.  Any updates or changes from those notes are reflected in this note.    Clinically Significant Risk Factors Present on Admission                 # Drug " "Induced Platelet Defect: home medication list includes an antiplatelet medication   # Hypertension: Noted on problem list           # Obesity: Estimated body mass index is 37.73 kg/m  as calculated from the following:    Height as of 6/10/25: 1.6 m (5' 3\").    Weight as of 6/10/25: 96.6 kg (213 lb).                    "

## 2025-06-25 NOTE — PROGRESS NOTES
"  Colorectal Surgery Progress Note  Surgery Cross-Cover  Post Op Check    06/25/2025    Jenna Jhaveri is a 75 year old female POD#0 s/p Procedure(s):  cystoscopy and suprapubic tube placement  LAPAROSCOPIC CREATION COLOSTOMY  **Latex Allergy** for Pre-Op Diagnosis Codes:      * Incontinence of feces, unspecified fecal incontinence type [R15.9]     * Bowel and bladder incontinence [R32, R15.9]     * Abnormal coagulation profile [R79.1]     * Abnormal results of liver function studies [R94.5]    Pt reports their pain is controlled with current regimen. Denies nausea, SOB, chest pain, or dizziness. Patient Is passing flatus but has not yet had a BM and Is voiding via urinary catheter (suprapubic).     BP 99/65   Pulse 73   Temp 97.7  F (36.5  C) (Oral)   Resp 19   Ht 5' 3\"   Wt 211 lb 3.2 oz   SpO2 94%   BMI 37.41 kg/m      Gen: A&O x4, NAD   Chest: breathing non-labored on nasal cannula at 3 liters per minute   Abdomen: soft, appropriately tender, mildly distended. Small amount of blood and minmal gas in ostomy bag.   Incision: incisions clean, dry, intact closed with dermabond. 1 ostomy with bag in place.   Extremities: warm and well perfused  Devices: Nasal cannula, suprapubic urinary catheter    A/P: No acute post-op issues. Continue plan of care per primary team. Please call with any questions.    Perez Danielle, MS4  Medical Student       I agree with the documentation provided by the medical student above and was present for the history and physical exam. I made updates or corrections as appropriate.     Zan Dey MD  General Surgery Resident    **For on call schedules and contact information, please refer to [facility]**   "

## 2025-06-25 NOTE — ANESTHESIA CARE TRANSFER NOTE
Patient: Jenna Jhaveri    Procedure: Procedure(s):  cystoscopy and suprapubic tube placement  LAPAROSCOPIC CREATION COLOSTOMY  **Latex Allergy**       Diagnosis: Incontinence of feces, unspecified fecal incontinence type [R15.9]  Bowel and bladder incontinence [R32, R15.9]  Abnormal coagulation profile [R79.1]  Abnormal results of liver function studies [R94.5]  Diagnosis Additional Information: No value filed.    Anesthesia Type:   General     Note:    Oropharynx: oropharynx clear of all foreign objects and spontaneously breathing  Level of Consciousness: drowsy  Oxygen Supplementation: face mask  Level of Supplemental Oxygen (L/min / FiO2): 8  Independent Airway: airway patency satisfactory and stable  Dentition: dentition unchanged  Vital Signs Stable: post-procedure vital signs reviewed and stable  Report to RN Given: handoff report given  Patient transferred to: PACU    Handoff Report: Identifed the Patient, Identified the Reponsible Provider, Reviewed the pertinent medical history, Discussed the surgical course, Reviewed Intra-OP anesthesia mangement and issues during anesthesia, Set expectations for post-procedure period and Allowed opportunity for questions and acknowledgement of understanding      Vitals:  Vitals Value Taken Time   /80 06/25/25 11:38   Temp     Pulse 75 06/25/25 11:41   Resp 20 06/25/25 11:41   SpO2 97 % 06/25/25 11:41   Vitals shown include unfiled device data.    Electronically Signed By: YEIMI Sapp CRNA  June 25, 2025  11:41 AM

## 2025-06-25 NOTE — ANESTHESIA POSTPROCEDURE EVALUATION
Patient: Jenna Jhaveri    Procedure: Procedure(s):  cystoscopy and suprapubic tube placement  LAPAROSCOPIC CREATION COLOSTOMY  **Latex Allergy**       Anesthesia Type:  General    Note:  Disposition: Inpatient   Postop Pain Control: Uneventful            Sign Out: Well controlled pain   PONV: No   Neuro/Psych: Uneventful            Sign Out: Acceptable/Baseline neuro status   Airway/Respiratory: Uneventful            Sign Out: Acceptable/Baseline resp. status; O2 supplementation               Oxygen: Nasal Cannula   CV/Hemodynamics: Uneventful            Sign Out: Acceptable CV status; No obvious hypovolemia; No obvious fluid overload   Other NRE: NONE   DID A NON-ROUTINE EVENT OCCUR? No           Last vitals:  Vitals Value Taken Time   /72 06/25/25 11:45   Temp 36.5  C (97.7  F) 06/25/25 11:40   Pulse 71 06/25/25 12:00   Resp 19 06/25/25 12:00   SpO2 95 % 06/25/25 12:00   Vitals shown include unfiled device data.    Electronically Signed By: Cuate Alford MD  June 25, 2025  12:00 PM

## 2025-06-25 NOTE — OP NOTE
[facility] COLORECTAL SURGERY OPERATIVE REPORT  June 25, 2025    PREOPERATIVE DIAGNOSIS:  1. Neurogenic constipation  2. Dyslipidemia  3. Flatback syndrome  4. Obesity  5. VICKI (obstructive sleep apnea)  6. Polymyositis (H)  7. Prediabetes  8. Pulmonary embolism (H)  9. Restless legs syndrome (RLS)  10. Restrictive lung disease  11. Scoliosis     POSTOPERATIVE DIAGNOSIS:   1. Neurogenic constipation  2. Dyslipidemia  3. Flatback syndrome  4. Obesity  5. VICKI (obstructive sleep apnea)  6. Polymyositis (H)  7. Prediabetes  8. Pulmonary embolism (H)  9. Restless legs syndrome (RLS)  10. Restrictive lung disease  11. Scoliosis     PROCEDURE:  1. Laparoscopic end descending colostomy     ANESTHESIA: General endotracheal anesthesia plus local.     SURGEON:  Og Lion M.D.     ASSISTANT(S): Ghassan Lozano, CRS Fellow; Zan Dey, PGY3     INDICATIONS FOR PROCEDURE: 76 yo F presenting with severe neurogenic bowel and bladder incontinence. A colostomy for fecal diversion was recommended as well as a suprapubic catheter with Dr. Hillman.    General risks related to abdominal surgery were reviewed with the patient. These include, but are not limited to, death, myocardial infarction, pneumonia, urinary tract infection, deep venous thrombosis with or without pulmonary embolus, abdominal infection from bowel injury or abscess, fistula, anastomotic leak that may require reoperation and a stoma, ureteral injury, urinary dysfunction, sexual dysfunction, bowel obstruction, wound infection, and bleeding.    OPERATIVE DETAILS: After obtaining informed consent, the patient was brought to the operating room and placed in the modified lithotomy position. The patient underwent preoperative bilateral TAP blocks. Appropriate preoperative mechanical and chemical deep venous thrombosis prophylaxis, as well as preoperative prophylactic parenteral antibiotics were given. General endotracheal anesthesia was gently induced. Bilateral lower  "extremity pneumatic compression devices were applied and all pressure points were cushioned. A Ramsey catheter was inserted without difficulty. The abdomen was then prepped and draped in the standard sterile fashion. After a \"time-out\" was performed, a veress was placed at oneil's point, and pneumoperitoneum was established with CO2 without difficulty up to 15mmHg. The abdomen was entered with a 5 mm port by optiview technique superior to the umbilicus. Two additional 5 mm ports were placed in the right side of the abdomen.No evidence of bleeding, bowel injury or metastatic disease was visualized.      The abdomen was thoroughly inspected. There was no evidence of diffuse metastatic disease.  We  performed a limited mobilization along the White Line of [place] laterally to medially to be able to comfortably bring up a loop of sigmoid colon. We then checked the adequacy of the tension and it appeared to come up anteriorly appropriately to the abdominal wall. Next a circular incision was made at the marked ostomy site, dissected down through the soft tissue with anterior fascia incised vertically, muscle bluntly  and posterior fascia opened widely to accommodate two fingerbreadth. The colon was then brought out and insufflation paused. A mesenteric window was made, and the colon was transected with the EndoGIA 60 mm blue load stapled. A few additional mesenteric transections were made with the ligasure and the distal stump end was placed in the body. The abdomen was reinsufflated confirming orientation of the end colostomy.     All trocars were removed under direct vision with no signs of bleeding. The Twin port fascia was closed with 0 vicryl. Hemostasis was corroborated. Skin incisions were re-approximated with running subcuticular 4-0 Monocryl sutures and Dermabond.      The wounds were then covered with a blue towel and we proceeded to mature the ostomy. The staple line was resected. I was easily able to " enter the abdomen through the fascia at both limbs. The ostomy was then matured with 3-0 vicryl, with the afferent limb matured in standard Rehana fashion.  An ostomy appliance was then cut to size and applied.      Instrument, sponge, and needle counts were all correct, as reported to me at this time.      The patient tolerated the procedure well.      COMPLICATIONS: none.     ESTIMATED BLOOD LOSS: 20 mL.     REPLACEMENT:     - Crystalloid: 800 mL.     SPECIMEN(S): none     OPERATIVE COUNT: Complete.     OPERATIVE FINDINGS:   Severe scoliosis requiring additional lateral to medial dissection, which resulted in excellent tension free reach to the anterior abdominal wall. Orientation confirmed correct.     Og Lion MD  Division of Colon and Rectal Surgery  [facility]  [phone]

## 2025-06-25 NOTE — SUMMARY OF CARE
(Change note type to summary of care)  Reason for admission: colostomy and suprapubic cath creation  Admitted from:  PACU  Report received from: PACU RN         2 RN skin assessment completed by: declined for now due to pain but said would agree later      - Findings (add LDA if needed): know of 3 lap sites, new colostomy, and new suprapubic cath  Bed algorithm reevaluated:  yes  Was airflow pump ordered?:yes  Suction set up in room? yes  Care plan (primary problem) and education initiated: yes  MDRO education done if applicable: n/a  Pt informed about policy regarding no IV pumps off unit: yes  Flu shot ordered? (October-April only): n/a  Detailed Belongings: see NST note

## 2025-06-26 ENCOUNTER — APPOINTMENT (OUTPATIENT)
Dept: PHYSICAL THERAPY | Facility: CLINIC | Age: 75
DRG: 329 | End: 2025-06-26
Attending: SURGERY
Payer: COMMERCIAL

## 2025-06-26 VITALS
HEART RATE: 77 BPM | SYSTOLIC BLOOD PRESSURE: 107 MMHG | OXYGEN SATURATION: 92 % | HEIGHT: 63 IN | DIASTOLIC BLOOD PRESSURE: 73 MMHG | TEMPERATURE: 99 F | BODY MASS INDEX: 37.42 KG/M2 | RESPIRATION RATE: 16 BRPM | WEIGHT: 211.2 LBS

## 2025-06-26 LAB
ANION GAP SERPL CALCULATED.3IONS-SCNC: 7 MMOL/L (ref 7–15)
BUN SERPL-MCNC: 8.7 MG/DL (ref 8–23)
CALCIUM SERPL-MCNC: 7.5 MG/DL (ref 8.8–10.4)
CHLORIDE SERPL-SCNC: 105 MMOL/L (ref 98–107)
CREAT SERPL-MCNC: 0.57 MG/DL (ref 0.51–0.95)
EGFRCR SERPLBLD CKD-EPI 2021: >90 ML/MIN/1.73M2
ERYTHROCYTE [DISTWIDTH] IN BLOOD BY AUTOMATED COUNT: 15.9 % (ref 10–15)
GLUCOSE BLDC GLUCOMTR-MCNC: 107 MG/DL (ref 70–99)
GLUCOSE SERPL-MCNC: 101 MG/DL (ref 70–99)
HCO3 SERPL-SCNC: 26 MMOL/L (ref 22–29)
HCT VFR BLD AUTO: 39.3 % (ref 35–47)
HGB BLD-MCNC: 12 G/DL (ref 11.7–15.7)
MAGNESIUM SERPL-MCNC: 2.3 MG/DL (ref 1.7–2.3)
MCH RBC QN AUTO: 28.4 PG (ref 26.5–33)
MCHC RBC AUTO-ENTMCNC: 30.5 G/DL (ref 31.5–36.5)
MCV RBC AUTO: 93 FL (ref 78–100)
PLATELET # BLD AUTO: 190 10E3/UL (ref 150–450)
POTASSIUM SERPL-SCNC: 4.2 MMOL/L (ref 3.4–5.3)
RBC # BLD AUTO: 4.22 10E6/UL (ref 3.8–5.2)
SODIUM SERPL-SCNC: 138 MMOL/L (ref 135–145)
WBC # BLD AUTO: 8.6 10E3/UL (ref 4–11)

## 2025-06-26 PROCEDURE — 83735 ASSAY OF MAGNESIUM: CPT | Performed by: SURGERY

## 2025-06-26 PROCEDURE — 85027 COMPLETE CBC AUTOMATED: CPT | Performed by: SURGERY

## 2025-06-26 PROCEDURE — 97530 THERAPEUTIC ACTIVITIES: CPT | Mod: GP | Performed by: PHYSICAL THERAPIST

## 2025-06-26 PROCEDURE — 36415 COLL VENOUS BLD VENIPUNCTURE: CPT | Performed by: SURGERY

## 2025-06-26 PROCEDURE — 97161 PT EVAL LOW COMPLEX 20 MIN: CPT | Mod: GP | Performed by: PHYSICAL THERAPIST

## 2025-06-26 PROCEDURE — 999N000111 HC STATISTIC OT IP EVAL DEFER: Performed by: OCCUPATIONAL THERAPIST

## 2025-06-26 PROCEDURE — 80048 BASIC METABOLIC PNL TOTAL CA: CPT | Performed by: SURGERY

## 2025-06-26 PROCEDURE — 250N000011 HC RX IP 250 OP 636: Performed by: SURGERY

## 2025-06-26 PROCEDURE — 250N000013 HC RX MED GY IP 250 OP 250 PS 637

## 2025-06-26 PROCEDURE — 250N000013 HC RX MED GY IP 250 OP 250 PS 637: Performed by: SURGERY

## 2025-06-26 PROCEDURE — 97116 GAIT TRAINING THERAPY: CPT | Mod: GP | Performed by: PHYSICAL THERAPIST

## 2025-06-26 PROCEDURE — G0463 HOSPITAL OUTPT CLINIC VISIT: HCPCS

## 2025-06-26 PROCEDURE — 120N000002 HC R&B MED SURG/OB UMMC

## 2025-06-26 RX ORDER — TOLTERODINE 2 MG/1
2 CAPSULE, EXTENDED RELEASE ORAL DAILY
OUTPATIENT
Start: 2025-06-27

## 2025-06-26 RX ORDER — GABAPENTIN 100 MG/1
100 CAPSULE ORAL AT BEDTIME
Qty: 10 CAPSULE | Refills: 0 | OUTPATIENT
Start: 2025-06-26

## 2025-06-26 RX ORDER — OXYCODONE HYDROCHLORIDE 5 MG/1
5 TABLET ORAL EVERY 8 HOURS PRN
Qty: 5 TABLET | Refills: 0 | OUTPATIENT
Start: 2025-06-26

## 2025-06-26 RX ORDER — TOLTERODINE 2 MG/1
2 CAPSULE, EXTENDED RELEASE ORAL DAILY
Status: DISPENSED | OUTPATIENT
Start: 2025-06-26

## 2025-06-26 RX ORDER — IPRATROPIUM BROMIDE 42 UG/1
2 SPRAY, METERED NASAL 3 TIMES DAILY PRN
Status: ON HOLD | COMMUNITY

## 2025-06-26 RX ORDER — METHOCARBAMOL 500 MG/1
500 TABLET, FILM COATED ORAL 4 TIMES DAILY
Qty: 20 TABLET | Refills: 0 | OUTPATIENT
Start: 2025-06-26

## 2025-06-26 RX ORDER — IBUPROFEN 600 MG/1
600 TABLET, FILM COATED ORAL 3 TIMES DAILY
Qty: 20 TABLET | Refills: 0 | OUTPATIENT
Start: 2025-06-26

## 2025-06-26 RX ADMIN — PROPRANOLOL HYDROCHLORIDE 80 MG: 80 CAPSULE, EXTENDED RELEASE ORAL at 19:39

## 2025-06-26 RX ADMIN — METHOCARBAMOL 500 MG: 500 TABLET ORAL at 19:37

## 2025-06-26 RX ADMIN — METHOCARBAMOL 500 MG: 500 TABLET ORAL at 16:41

## 2025-06-26 RX ADMIN — OXYCODONE HYDROCHLORIDE 5 MG: 5 TABLET ORAL at 23:18

## 2025-06-26 RX ADMIN — DULOXETINE HYDROCHLORIDE 30 MG: 30 CAPSULE, DELAYED RELEASE ORAL at 08:03

## 2025-06-26 RX ADMIN — PANTOPRAZOLE SODIUM 40 MG: 40 TABLET, DELAYED RELEASE ORAL at 19:37

## 2025-06-26 RX ADMIN — TOLTERODINE 2 MG: 2 CAPSULE, EXTENDED RELEASE ORAL at 12:08

## 2025-06-26 RX ADMIN — PANTOPRAZOLE SODIUM 40 MG: 40 TABLET, DELAYED RELEASE ORAL at 08:03

## 2025-06-26 RX ADMIN — AMLODIPINE BESYLATE 10 MG: 10 TABLET ORAL at 08:03

## 2025-06-26 RX ADMIN — GABAPENTIN 100 MG: 100 CAPSULE ORAL at 21:51

## 2025-06-26 RX ADMIN — KETOROLAC TROMETHAMINE 15 MG: 30 INJECTION, SOLUTION INTRAMUSCULAR at 03:33

## 2025-06-26 RX ADMIN — KETOROLAC TROMETHAMINE 15 MG: 30 INJECTION, SOLUTION INTRAMUSCULAR at 10:01

## 2025-06-26 RX ADMIN — PROPRANOLOL HYDROCHLORIDE 80 MG: 80 CAPSULE, EXTENDED RELEASE ORAL at 08:09

## 2025-06-26 RX ADMIN — METHOCARBAMOL 500 MG: 500 TABLET ORAL at 12:08

## 2025-06-26 RX ADMIN — IBUPROFEN 600 MG: 600 TABLET, FILM COATED ORAL at 21:07

## 2025-06-26 RX ADMIN — IBUPROFEN 600 MG: 600 TABLET, FILM COATED ORAL at 17:04

## 2025-06-26 RX ADMIN — METHOCARBAMOL 500 MG: 500 TABLET ORAL at 08:03

## 2025-06-26 RX ADMIN — ENOXAPARIN SODIUM 40 MG: 40 INJECTION SUBCUTANEOUS at 10:01

## 2025-06-26 ASSESSMENT — ACTIVITIES OF DAILY LIVING (ADL)
DEPENDENT_IADLS:: TRANSPORTATION
ADLS_ACUITY_SCORE: 41
ADLS_ACUITY_SCORE: 31
ADLS_ACUITY_SCORE: 27
ADLS_ACUITY_SCORE: 41
ADLS_ACUITY_SCORE: 31
ADLS_ACUITY_SCORE: 27
ADLS_ACUITY_SCORE: 31
ADLS_ACUITY_SCORE: 27
ADLS_ACUITY_SCORE: 31
ADLS_ACUITY_SCORE: 41
ADLS_ACUITY_SCORE: 41
ADLS_ACUITY_SCORE: 27
ADLS_ACUITY_SCORE: 27
ADLS_ACUITY_SCORE: 41

## 2025-06-26 NOTE — PLAN OF CARE
"Goal Outcome Evaluation:             Assumed cares from 15:00 to 19:30    Blood pressure 107/65, pulse 89, temperature 98  F (36.7  C), temperature source Oral, resp. rate 16, height 1.6 m (5' 3\"), weight 95.8 kg (211 lb 3.2 oz), SpO2 97%, not currently breastfeeding.    AVSS, A/Ox 4. Pt is very pleasant. mShe is using 3 L NC of 02 with saturations WNL. C/O right Lower abdominal pain and rated pain at 5/10 and got her scheduled Robaxin Ibuprofen and warm pack. She said the warm pack also helped. with good result with pain level coming down to 3/10. PIVs intact and SL'ed. Pt ate 75 % of supper. Urostomy drain putout 350 ML of aurora urine and colostomy with small loose brown output. Pt ambulated from her room to the nurses station with 3 L 03 on and SBA. Pt tolerated ambulation well. Her  is at the bedside supportive of pt. Continue to monitor and follow plan of care.      Problem: Delirium  Goal: Optimal Coping  Outcome: Progressing  Goal: Improved Behavioral Control  Outcome: Progressing  Intervention: Minimize Safety Risk  Recent Flowsheet Documentation  Taken 6/26/2025 1630 by Deb Lew RN  Enhanced Safety Measures:   pain management   review medications for side effects with activity  Goal: Improved Attention and Thought Clarity  Outcome: Progressing  Goal: Improved Sleep  Outcome: Progressing                   "

## 2025-06-26 NOTE — PLAN OF CARE
Goal Outcome Evaluation:    Plan of Care Reviewed With: patient    Overall Patient Progress: improving  Overall Patient Progress: improving    Outcome Evaluation: Assumed care from [id number]. A&Ox4, vitally stable on 3L NC in the evening, CPAP + 5L O2 overnight. Capnography implemented in the evening, paused overnight d/t CPAP. L PIV infusing NS @ 50mL/hr - R PIV SL. Colostomy intact with low output & catheter intact with good output. Pain managed with scheduled medications. Skin check completed, see previous note. AM  mg/dL. Continue with plan of care,     Clau Wright RN

## 2025-06-26 NOTE — PROGRESS NOTES
CRS PROGRESS NOTE    S: No acute events overnight. Tolerated FLD without N/V. Reports having ostomy output.     O:   Intake/Output Summary (Last 24 hours) at 6/26/2025 0728  Last data filed at 6/26/2025 0659  Gross per 24 hour   Intake 1320 ml   Output 1200 ml   Net 120 ml      GENERAL: NAD, resting comfortably in bed  CARDIO: normal rate and regular rhythm  PULM: no increased WOB  ABD: non-distended, ostomy pink and viable; ostomy bag with stool and gas; soft, and non-tender  : SPC with light yellow urine   PSYCH: appropriate affect and behavior    A/P: 75 year old female with hx of cauda equina syndrome c/b fecal/urinary incontinence who underwent a lap end colostomy creation with Dr. Lion and suprapubic catheter placement with urology on 6/25.     Having antegrade bowel function and tolerating FLD.     - MMPC  - advance to LRD; discontinue IVFs  - PT/OT/WOC    Patient was discussed with staff this morning.     Zan Dey MD  General Surgery Resident  [phone]

## 2025-06-26 NOTE — DISCHARGE INSTRUCTIONS
Ostomy Discharge Instructions/Orders     Hospital:  [facility]     Ostomy Clinic Follow-up:     Please arrive 10 - 15 minutes prior to your ostomy clinic appointment, so you can complete the registration process.     [facility] [place][phone]) Please call to request a clinic appointment. If you must leave a voice message include your name, date of birth and phone number and request clinic appointment. The clinic is located at [address] [place] [address] in [place]     Follow up 2 - 4 weeks post-op for a stoma site assessment - your appointment has been scheduled   (7/15/25 930am with Keila)     Pouch Change Instructions:  1. Change appliance twice weekly and as needed for any leakage.  Notify the WO Nurse if changing pouch more often than daily or if skin is itchy.  2. Empty pouch when no more than 1/3 to 1/2 full (solid, liquid, gas).  3. May shower with pouch on or off, removing pouch/ barrier down to the skin is ok. (Note: fecal output can not be controlled, so there may be occasional clean up if you choose to shower without a pouch.)     The pouching procedure:  1.  Use the  Pouch Change Instruction  sheet to gather and organize supplies  2.  Trace old pattern onto new pouch and cut out new opening on new barrier.  3.  Remove old pouch, observe for any leakage.  4.  Clean skin with water and paper towel.  5.  Apply Ring around stoma. (May use other products as instructed by WO nurse at this time.)  6.  Apply prepared barrier to the clean skin and press into place.  7.  Hold hand on pouch/over stoma to warm pouch (2 - 5 minutes) to help adherence.     Ostomy products to use at hospital discharge (supplies sent home from hospital, use until new supplies arrive):  [place] 2 piece fecal with filter #52138  Convex CTF #74847  3M Cavilon No-Sting skin barrier #9910 and Rusty Adapt Cera Ring #1004     ABC's of Ostomy Care  Activity:  Walk short distances & increase as your strength allows  You may climb  stairs  Do not do strenuous exercise or activity such as golfing or heavy lifting  Do not drive until approved by your doctor  Carry an extra pouching system with you, be prepared (do not leave this in the car as weather may adversely affect the adhesive)     Bathing:  You may shower with your pouch on; Dry under the pouch after the shower  If it is your change day, you may remove pouch and shower without it     Clothing:  Wear loose clothing for comfort in the immediate post-op period  No leather belts should be worn near the stoma, as it can cause an injury to the stoma mucosa     Diet:  Consume foods throughout the day that will help to thicken output and therefore help maintain a good seal.    Consider these foods:  Grains such as pasta, rice, soda crackers ,pretzels, cheese and yogurt, applesauce, bananas, potatoes, peanut butter and tapioca  Some foods increase/thin your output: Sugars,Prunes, Raisins  Eat 3-4 servings of protein per day  No timed release medications     Avoiding a blockage:  Eat 6 small meals/day; small bites and chew well  Avoid raw vegetables, seeds, nuts peels, grapefruit, oranges, pineapple, fruit peelings (e.g. apple)  Avoid tough meats like jerkies and meat with casings; ground meat is easier to digest, chew other cuts of meat well  If the food cannot be cut easily with a fork avoid for now.  IF you have a blockage try to gently massage blockage, do NOT take a laxative and call MD.     Fluids:  Drink plenty of fluids, minimally  8 cups of non-caffeinated beverage per day  plus if you empty your pouch, have another glass of fluid  Remember caffeine and alcohol makes you more dehydrated, be sure to add in more fluids if consuming caffeine or alcohol  Monitor the color of your urine, if getting too dark, need more fluids     Avoiding dehydration:  Remember caffeine and alcohol makes you more dehydrated, be sure to add in more fluids if consuming caffeine or alcohol  Monitor the color of  your urine, if getting too dark increase fluid intake  Keep your input/output in balance and notify MD if out of balance  If you feel you are getting dehydrated then consider drinking Gatorade/Pedialyte or similar beverage     Stoma Injury or Concern:  Call the [facility] Nurse to report the following concerns (see phone number above):  Skin concerns around the stoma: red, rash, open areas  Concerns with the pouch opening being too small or too large  Bulging around the stoma without pain and with continued output  Pouch leaking daily or more frequently     Call the surgeon or go to the emergency department for the following concerns:  Stoma turns blue or darker in color  Bulging around the stoma with no output; likely will have pain as well        Supply Ordering after hospital discharge:  Upon discharge from the hospital you will be sent home with ostomy supplies.   These supplies are to be used for your twice weekly stoma site care while you order/set up your monthly supply order.      If you have been set up for home care visits the home home care nurse will help you coordinate ordering supplies.       If you have not been set up for home care then make sure to make a follow up appointment with the [facility] nurse to reassess your abdomen and ostomy for changes and determine the correct plan.  At that time ordering supplies will be reviewed.       Supply ordering:  You are responsible to order ostomy supplies after discharge from the hospital.  Please contact your medical supply company and give them the information detailed below related to ostomy supplies. The medical supply company will give further instructions if necessary.     You may choose whichever ostomy product supplier you desire. Some more common suppliers/contact information are listed below:  [employer] Phone: [phone] ext. [phone]; Fax: [phone] (local company with store near [facility] [address] in [place])  [facility] Ph:  "[phone]. [phone]  [employer]   Phone: [phone]  [employer] Phone: [phone]. [phone]     Your Medical Supplier will need your surgeon's name, phone and fax number:  Clinic.orders: Colorectal Surgery ([facility]) Phone: [phone] Fax: [phone]     Surgery date:  6/25/2025  Surgeon:  Og Lion MD  Procedure:  Laparoscopic end descending colostomy   Related diagnosis:   Neurogenic constipation      Recommended supplies to order:  Pouches:  2 piece fecal pouch with filter 57mm [place] # 00679  Skin barriers: Rusty 2 piece flat wafer 57 mm #65538   Ring: [place] Adapt Cera 2\" Ring #8805   Accessories: [place] [employer] powder #7906, Rusty Adapt odor elim/lubricant packets (50/box) #04701, M9 room spray deodorizer #1495, and 3M Cavilon No Sting Skin (with stick) barrier #9342     Authorizing MD: Dr. Lion     Verbal Order for Ostomy Supplies for 1 month per:   Signature: Patria BRENNAN           Additional Ostomy Resources:  Support Groups:   O[place]ym[place].org - OAMA Ostomy Association of [place] - email newsletters and in-person monthly meetings with virtual option   2.   [employer].myinfoQ - [facility] ostomy support group - newsletters and in-person monthly meetings      Virtual Ostomy Support  [employer]- https://www.[email or web address].co/     Common Product Manufacturers:  [employer]/en/ostomycare  2.   Coloplast.us/ostomy  3.   [employer].myinfoQ/[email or web address]     Ostomy Support Products:  Stealthbelt.com - pouch supports/covers  2.   Ost[employer]ud[employer] - smart phone roxana to help manage ostomy cares  "

## 2025-06-26 NOTE — PROGRESS NOTES
Admitted/transferred from: PACU  2 RN full skin assessment completed by Clau Wright RN and CASANDRA Calles    Skin assessment findin lap sites (4 abdominal, 1 above suprapubic catheter), suprapubic catheter, colostomy.     Interventions/actions: No adjustments needed.     Clau Wright RN

## 2025-06-26 NOTE — PHARMACY-ADMISSION MEDICATION HISTORY
Pharmacist Admission Medication History    Admission medication history is complete. The information provided in this note is only as accurate as the sources available at the time of the update.    Information Source(s): Patient and CareEverywhere/SureScripts via in-person    Pertinent Information: Followed up a great initial med history completed by nurse. Virginia knew her medications very well.    Changes made to PTA medication list:  Added: Ipratropium nasal spray  Deleted: estradiol vaginal cream, fexofenadine (uses loratadine instead), guaifenesin AC (completed), fluticasone nasal spray (uses ipratropium nasal spray instead)  Changed: None    Allergies reviewed with patient and updates made in EHR: yes     Medication History Completed By: Americo aMncia [facility] 6/26/2025 10:36 AM    PTA Med List   Medication Sig Last Dose/Taking    albuterol (PROAIR HFA/PROVENTIL HFA/VENTOLIN HFA) 108 (90 Base) MCG/ACT inhaler Inhale 2 puffs into the lungs every 6 hours as needed for shortness of breath / dyspnea or wheezing Unknown    alendronate (FOSAMAX) 70 MG tablet Take 70 mg by mouth once a week. Sunday Last dose 6/8/25 Past Week    amLODIPine (NORVASC) 10 MG tablet Take 10 mg by mouth every morning. 6/24/2025    aspirin (ASA) 81 MG chewable tablet Take 81 mg by mouth every evening Past Month    Calcium-Vitamin D-Vitamin K 500-200-40 MG-UNT-MCG CHEW Take 1 chew tab by mouth every morning. 650 Ca - 500 D - 40 K Past Week    cholecalciferol 50 MCG (2000 UT) tablet Take 2,000 Units by mouth every morning. Past Week    DULoxetine (CYMBALTA) 30 MG capsule Take 30 mg by mouth every morning. 6/24/2025    econazole nitrate 1 % external cream APPLY CREAM TOPICALLY TO AFFECTED AREA ONCE DAILY Taking    fluocinonide (LIDEX) 0.05 % external solution Apply topically daily as needed (scalp) Past Month    fluticasone (FLONASE) 50 MCG/ACT nasal spray Spray 1 spray in nostril daily as needed for rhinitis Past Week    fluticasone-salmeterol  (ADVAIR) 250-50 MCG/DOSE inhaler Inhale 1 puff into the lungs 2 times daily 2025    furosemide (LASIX) 20 MG tablet Take 20 mg by mouth daily as needed (ankle swelling) Unknown    icosapent ethyl (VASCEPA) 1 g CAPS capsule Take 2 g by mouth 2 times daily (with meals). More than a month    inclisiran (LEQVIO) 284 MG/1.5ML SOSY SQ injection Inject 284 mg subcutaneously every 6 months. Last dose March-2025 More than a month    ipratropium (ATROVENT) 0.06 % nasal spray Spray 2 sprays into both nostrils 3 times daily as needed for other (nasal drip). Taking As Needed    ipratropium - albuterol 0.5 mg/2.5 mg/3 mL (DUONEB) 0.5-2.5 (3) MG/3ML neb solution Inhale 3 mLs into the lungs every 4 hours as needed for shortness of breath Unknown    loratadine (CLARITIN) 10 MG tablet Take 10 mg by mouth every morning. Past Week    metroNIDAZOLE (FLAGYL) 500 MG tablet Take 1 tablet (500 mg) by mouth every 6 hours. At 8:00 am, 2:00 pm, 8:00 pm the day prior to your surgery with neomycin and zofran. Past Week    mometasone (ELOCON) 0.1 % external solution Apply few drops to areas of itching on scalp nightly as needed for up to 2 weeks and then sparingly thereafter. Past Month    multivitamin w/minerals (MULTI-VITAMIN) tablet Take 1 tablet by mouth daily Uses here and there Past Month    mupirocin (BACTROBAN) 2 % external ointment Apply once daily to skin around nail, tip of nail and under nail x 4 weeks. Past Month    neomycin (MYCIFRADIN) 500 MG tablet Take 2 tablets (1,000 mg) by mouth every 6 hours. At 8:00 am, 2:00 pm, 8:00 pm the day prior to your surgery with flagyl and zofran. 2025    [] nitroFURantoin macrocrystal-monohydrate (MACROBID) 100 MG capsule Take 1 capsule (100 mg) by mouth 2 times daily for 3 days. 2025    nitrous oxide/oxygen 50/50 blend 1 application. by inhalation route as directed. 3 lpm continuous 2025    ondansetron (ZOFRAN) 4 MG tablet Take 1 tablet (4 mg) by mouth every 6  hours. At 8:00 am, 2:00 pm, 8:00 pm the day prior to your surgery with neomycin and flagyl. 6/24/2025    polyethylene glycol (MIRALAX) 17 GM/Dose powder Please take 238 grams mixed with 64 oz of Gatorade at 4pm the night before surgery 6/24/2025    primidone (MYSOLINE) 50 MG tablet Take 50 mg by mouth 2 times daily. 6/24/2025    propranolol ER (INDERAL LA) 80 MG 24 hr capsule Take 80 mg by mouth 2 times daily. 6/24/2025    zinc sulfate (ZINCATE) 220 (50 Zn) MG capsule Take 220 mg by mouth daily Past Month

## 2025-06-26 NOTE — CONSULTS
[facility]  WO Nurse Inpatient Assessment     Consulted for: new end colostomy    WO nurse follow-up plan: daily    Patient History (according to provider note(s):      Per Dr Dey's note on 6/26 : 75 year old female with hx of cauda equina syndrome c/b fecal/urinary incontinence who underwent a lap end colostomy creation with Dr. Lion and suprapubic catheter placement with urology on 6/25.     Assessment:      Areas visualized during today's visit: Focused: abdomen     Assessment of new end Colostomy:  Diagnosis Pertinent to Stoma: severe neurogenic bowel and incontinence       Surgery Date: 6/25/25  Surgeon: Og Lion MD       Hospital: [facility]  Pouching system in place on assessment today: Rusty two piece 70 flat, cut to fit, and barrier ring   Pouch barrier status: Leaking at 3-9 o clock  Pouch last changed/wear time: 1d  Reason for pouch change today: ostomy education  Effectiveness of current pouching/ supply plan: Recommend continuing current plan and re-evaluate tomorrow  Change made with ostomy management today: Yes (used 57 instead of 70)  Pouching system placed today: Rusty two piece 57 flat, cut to fit, barrier ring, and skin prep   Supplies: gathered, at bedside, and ordered pouches and accessories   Last photo: n/a  Stoma location: LUQ  Stoma size: 1 1/2 inches (38 mm)  Stoma appearance: pink-red, round, moist, edematous, and dark/bumpy area (hematoma?) from 1-3 o'clock, os pointing downward at skin level  Mucocutaneous junction:  intact  Peristomal complication(s): none   Output: gas, liquid, and brown  Output volume emptied during visit: 100 ml  Abdominal assessment: Soft and Distended  Surgical site(s): dressing dry and intact  NG still in place? No  Pain: Dull ache  Is patient still on a PCA? No    Ostomy education assessment:  Participant of teaching session today: patient   Education completed today: Initial fitting, Stoma  assessment, Pouching system assessment , Evaluate leakage issue, Refitting of appliance , Adjustment of pouching plan, Pouch change demonstration, Ostomy accessory product use , Introduction to pouches, Peristomal skin care, Pouch emptying demonstration, and Odor/flatus management   Educational materials/methods: Verbal, Written, Demonstration, Product sample, Addressed patient/caregiver questions/concerns, and Left packet of information at bedside   Education still needed: Pouch change return demonstration, Pouch emptying return demonstration, Intake and output recording, Fluid and electrolyte balance , Importance of hydration, When to seek medical attention, Low fiber diet , Infection prevention/hygiene , Hernia prevention, Lifestyle adjustments , and Discharge instructions  Learning Comprehension:   Psychosocial assessment: Pt says she's had severe fecal incontinence for about 3 years, is excited to have ostomy as it was very difficult to leave her home. Attentive to teaching, but says she is worried she cannot remember all of this.  Lives with  who has vision impairment but can offer some support. Son/daughter can also assist.  Patient readiness for education today: attentive and active participation  Following today's visit: patient  is able to demonstrate;         1. How to empty their pouch? Demo provided  and Needs additional practice         2. How to change their pouch? Po provided  and Needs additional practice         3. How to read and record intake and output correctly? No  Preparation for discharge completed: Discussed how to order supplies after discharge  and Discussed how and when to make an outpatient [facility] nurse appointment after discharge  Preparation for discharge still needed: Placed prescription recommendations in discharge navigator for MD to sign, Ensured patient has extra supplies for discharge, Ordered samples from  after gaining consent from patient/caregiver, and  "Discuss signs/symptoms of when to seek medical attention  Pt support system on discharge:   WOC recommend home care? No  Face to face time: 60 minutes        Treatment Plan:     LUQ Colostomy pouching plan:   Pouching system: ostomy supplies pouches: [place] 57 FECAL (451485) ostomy supplies barrier: Rusty 57mm SOFT CONVEX (646984)  Accessories used: [facility] ostomy accessories: 2\" Cera Barrier Ring (113736) and Cavilon no sting barrier film (005977)   Frequency of pouch changes: Twice weekly and PRN leakage  WOC follow up plan: Daily Monday-Friday (as able)  Bedside RN interventions: Change pouch PRN if leaking using the supplies above, Empty pouch when 1/3 to 1/2 full, ensure to clean pouch outlet after emptying to prevent odor, Notify WOC for ongoing pouch leakage, Document stoma appearance and output volume, color, and consistency every shift, Encourage patient to empty pouch with assist, and Assist patient to measure and record output       Orders: Written    RECOMMEND PRIMARY TEAM ORDER: None, at this time  Education provided: plan of care and Hygiene  Discussed plan of care with: Patient  Notify WOC if wound(s) deteriorate.  Nursing to notify the Provider(s) and re-consult the WOC Nurse if new skin concern.    DATA:     Current support surface: Standard  Standard gel mattress (Isoflex)  Containment of urine/stool: Suprapubic catheter and Colostomy pouch  BMI: Body mass index is 37.41 kg/m .   Active diet order: Orders Placed This Encounter      Low Fiber Diet     Output: I/O last 3 completed shifts:  In: 1320 [P.O.:420; I.V.:900]  Out: 1200 [Urine:1125; Stool:65; Blood:10]     Labs:   Recent Labs   Lab 06/26/25  0544   HGB 12.0   WBC 8.6     Pressure injury risk assessment:   Sensory Perception: 4-->no impairment  Moisture: 3-->occasionally moist  Activity: 3-->walks occasionally  Mobility: 3-->slightly limited  Nutrition: 3-->adequate  Friction and Shear: 2-->potential problem  Manas Score: " 18    Pager no longer is use, please contact through [employer] group: [facility] Nurse [place]  Dept. Office Number: [phone]    Patria Osei RN CWOCN

## 2025-06-26 NOTE — PLAN OF CARE
Goal Outcome Evaluation:      Plan of Care Reviewed With: patient    Overall Patient Progress: improvingOverall Patient Progress: improving     Shift Hours: 0700 - 1500    Assessment:  Body systems that were not at patient's baseline Gastrointestinal, Genitourinary, and Musculoskeletal. Focused body system assessments documented in flowsheets.        Activity     Fall Risk Score: 75   Bed alarm on? Yes     Activity Assistance Provided: assistance, 1 person           Pain: little pain in RLQ abdomen    Labs/RN Managed Protocols: None    Lines/Drains: R and L PIV SL, suprapubic and colostomy    Nutrition: Low fiber    Goal Outcome Evaluation  Plan of Care Reviewed With: patient  Overall Patient Progress: improving   Alert and oriented and VSS. C/O pain in RLQ abdomen but managed with scheduled medications. Suprapubic and colostomy voiding. WOC changed colostomy today. Team paged about urinary incontinence, detrol started daily. Worked with PT today    Barriers to Discharge:   Continue plan of care.

## 2025-06-26 NOTE — CONSULTS
"Care Management Initial Consult    General Information  Assessment completed with: Patient, Spouse or significant other, Faheem Coleman  Type of CM/SW Visit: Initial Assessment    Primary Care Provider verified and updated as needed: Yes   Readmission within the last 30 days: no previous admission in last 30 days      Reason for Consult: discharge planning  Advance Care Planning: Advance Care Planning Reviewed: present on chart          Communication Assessment  Patient's communication style: spoken language (English or Bilingual)    Hearing Difficulty or Deaf: no   Wear Glasses or Blind: yes    Cognitive  Cognitive/Neuro/Behavioral: WDL                      Living Environment:   People in home: spouse  Faheem  Current living Arrangements: house      Able to return to prior arrangements: yes  Living Arrangement Comments: one cat in the home    Family/Social Support:  Care provided by: self, spouse/significant other  Provides care for: no one  Marital Status:   Support system: , Children  Faheem       Description of Support System: Supportive, Involved    Support Assessment: Adequate family and caregiver support    Current Resources:   Patient receiving home care services: No        Community Resources:    Equipment currently used at home: grab bar, tub/shower, grab bar, toilet, shower chair, walker, rolling, other (see comments) (bed rail, \"grabbers and reachers\")  Supplies currently used at home: Oxygen Tubing/Supplies    Employment/Financial:  Employment Status: retired        Financial Concerns:             Does the patient's insurance plan have a 3 day qualifying hospital stay waiver?  No    Lifestyle & Psychosocial Needs:  Social Drivers of Health     Food Insecurity: Low Risk  (6/25/2025)    Food Insecurity     Within the past 12 months, did you worry that your food would run out before you got money to buy more?: No     Within the past 12 months, did the food you bought just not last and you didn t " have money to get more?: No   Depression: Not at risk (2/3/2025)    PHQ-2     PHQ-2 Score: 0   Housing Stability: Low Risk  (6/25/2025)    Housing Stability     Do you have housing? : Yes     Are you worried about losing your housing?: No   Tobacco Use: Medium Risk (6/25/2025)    Patient History     Smoking Tobacco Use: Former     Smokeless Tobacco Use: Never     Passive Exposure: Past   Financial Resource Strain: Low Risk  (6/25/2025)    Financial Resource Strain     Within the past 12 months, have you or your family members you live with been unable to get utilities (heat, electricity) when it was really needed?: No   Alcohol Use: Not on file   Transportation Needs: High Risk (6/25/2025)    Transportation Needs     Within the past 12 months, has lack of transportation kept you from medical appointments, getting your medicines, non-medical meetings or appointments, work, or from getting things that you need?: Yes   Physical Activity: Not on File (11/13/2023)    Received from [employer] [facility]    Physical Activity     Physical Activity: 0   Interpersonal Safety: Low Risk  (6/25/2025)    Interpersonal Safety     Do you feel physically and emotionally safe where you currently live?: Yes     Within the past 12 months, have you been hit, slapped, kicked or otherwise physically hurt by someone?: No     Within the past 12 months, have you been humiliated or emotionally abused in other ways by your partner or ex-partner?: No   Stress: Not on File (11/13/2023)    Received from [employer] [facility]    Stress     Stress: 0   Social Connections: Not on File (10/8/2024)    Received from [employer]    Social Connections     Connectedness: 0   Health Literacy: Adequate Health Literacy (4/14/2023)    [facility]    OASIS : Health Literacy     Frequency of needing help to read materials from doctor or pharmacy: Rarely       Functional Status:  Prior to admission patient needed assistance:   Dependent ADLs::  "Ambulation-walker  Dependent IADLs:: Transportation  Assesssment of Functional Status: At functional baseline    Mental Health Status:  Mental Health Status: No Current Concerns       Chemical Dependency Status:  Chemical Dependency Status: No Current Concerns             Values/Beliefs:  Spiritual, Cultural Beliefs, Methodist Practices, Values that affect care: yes  Description of Beliefs that Will Affect Care: Roman Catholic       Values/Beliefs Comment: Declines spiritual care consult at this time    Discussed  Partnership in Safe Discharge Planning  document with patient/family: No    Additional Information:  RNCC spoke with patient and spouse, Faheem, at bedside and introduced care management role. Both agreeable to start the conversation regarding patient's trajectory of care and potential discharge needs.    The following has been updated in the patient's chart: PCP, address, contact and insurance. ACP documents are on file.     Previous Arrangements: Patient came from home with spouse and a cat, performing all ADLs independently (with use of a walker) and most iADLs. They do not currently use community resources or home care agencies to assist with needs.  Previous DME: grab bars, shower chair, rolling walker, \"grabbers and reachers,\" oxygen through [employer]  Transportation: Spouse is willing and able to provide transportation at time of discharge  Finances: No noted concerns  Psychosocial: Patient/family identify as Roman Catholic. There are no mental health concerns noted on this assessment. A consult to  services has not been placed at this time, although it was offered. Patient was a bit tearful when discussing the ostomy and suprapubic catheter, but family brought optimism stating that these changes are \"for the best.\"    RNCC noted that the patient could benefit from a home care nurse to assist with the new colostomy and suprapubic catheter. Historically, the patient has used [facility] and would " be open to their services again, but does not have a strong preference. A home care referral was sent via [employer].    Pending patient's assessment/teaching with WOC RN tomorrow, she may be able to discharge with home care services.      Next Steps:   []Follow up on home care referral  []Update family when there is an accepting home care agency  []IMM to be given at discharge  [x]Discharge ride home: spouse; portable home O2 already available at the bedside   []Send hand-off at discharge (external - [facility])        RESOURCES  [employer] - oxygen PTA (3LPM)  Phone: [phone]  Fax: [phone]        Yen Thompson RN Care Coordinator  Reachable on [employer] or Teams  [phone] (updated daily)

## 2025-06-26 NOTE — PLAN OF CARE
OT order received and chart reviewed.  Per discussion with evaluating PT, no skilled OT needs identified.  Patient is SBA for transfers/mobility, is familiar with precautions/recovery from previous surgeries and has assist at home.  Patient is appropriate for 1 discipline to follow, and PT will continue to follow for activity progression. Will complete OT orders.

## 2025-06-26 NOTE — DISCHARGE SUMMARY
[facility]  Discharge Summary  Colon and Rectal Surgery     Jenna Jhaveri MRN# [id number]   YOB: 1950 Age: 75 year old     Date of Admission:  6/25/2025  Date of Discharge::  {:060862}  Admitting Physician:  Og Lion MD  Discharge Physician:  Og Lion MD  Primary Care Physician:        Arsalan Blas          Admission Diagnoses:   Incontinence of feces, unspecified fecal incontinence type [R15.9]  Bowel and bladder incontinence [R32, R15.9]  Abnormal coagulation profile [R79.1]  Abnormal results of liver function studies [R94.5]  Constipation due to neurogenic bowel [K59.00, K59.2]    1. Neurogenic constipation  2. Dyslipidemia  3. Flatback syndrome  4. Obesity  5. VICKI (obstructive sleep apnea)  6. Polymyositis (H)  7. Prediabetes  8. Pulmonary embolism (H)  9. Restless legs syndrome (RLS)  10. Restrictive lung disease  11. Scoliosis          Discharge Diagnosis:     Incontinence of feces, unspecified fecal incontinence type [R15.9]  Bowel and bladder incontinence [R32, R15.9]  Abnormal coagulation profile [R79.1]  Abnormal results of liver function studies [R94.5]  Constipation due to neurogenic bowel [K59.00, K59.2]    1. Neurogenic constipation  2. Dyslipidemia  3. Flatback syndrome  4. Obesity  5. VICKI (obstructive sleep apnea)  6. Polymyositis (H)  7. Prediabetes  8. Pulmonary embolism (H)  9. Restless legs syndrome (RLS)  10. Restrictive lung disease  11. Scoliosis         Procedures:   6/25/2025:   PROCEDURE:  1. Laparoscopic end descending colostomy     ANESTHESIA: General endotracheal anesthesia plus local.         Consultations:   PHYSICAL THERAPY ADULT IP CONSULT  CARE MANAGEMENT / SOCIAL WORK IP CONSULT  OCCUPATIONAL THERAPY ADULT IP CONSULT  WOUND OSTOMY CONTINENCE NURSE  IP CONSULT         Imaging Studies:     Results for orders placed or performed during the hospital encounter of 05/29/25   CT Chest Hi-Resolution  "wo Contrast    Narrative    EXAM: CT CHEST HI-RESOLUTION WO CONTRAST  LOCATION: [facility]  DATE: 5/29/2025    INDICATION: HR CT  (hi  resolution CT) \"dynamic CT inspiratory  and  expiratory\"  COMPARISON: 3/26/2025. 6/20/2023. 6/30/2022.  TECHNIQUE: High resolution images were obtained through the chest during inspiration with select expiratory views. Prone imaging was performed. Multiplanar reformats were obtained. Dose reduction techniques were used.  CONTRAST: None.    FINDINGS:   LUNGS AND PLEURA: There is mild scarring in the right lower lobe along the major fissure. There is mild architectural distortion in the periphery of the lungs. No significant change from the prior study. There is excessive dynamic airway collapse.   Emphysema is present. There is a 5 mm x 3 mm right lower lobe nodule (series 5 image 197).    MEDIASTINUM/AXILLAE: Normal.    CORONARY ARTERY CALCIFICATION: Moderate.    UPPER ABDOMEN: Cholecystectomy.    MUSCULOSKELETAL: Lateral curvature of the spine with spinal surgical hardware.      Impression    IMPRESSION:  1.  Excessive dynamic airway collapse.  2.  Emphysema and mild nonspecific fibrosis. No significant change.  3.  5 mm x 3 mm right lower lobe nodule. Twelve-month follow-up is optional.    REFERENCE:  Guidelines for Management of Incidental Pulmonary Nodules Detected on CT Images: From the Fleischner Society 2017.     SINGLE NODULE  Nodule size <6 mm  Low-risk patients: No follow-up needed.  High-risk patients: Optional follow-up at 12 months.              Medications Prior to Admission:     Medications Prior to Admission   Medication Sig Dispense Refill Last Dose/Taking    albuterol (PROAIR HFA/PROVENTIL HFA/VENTOLIN HFA) 108 (90 Base) MCG/ACT inhaler Inhale 2 puffs into the lungs every 6 hours as needed for shortness of breath / dyspnea or wheezing   Unknown    alendronate (FOSAMAX) 70 MG tablet Take 70 mg by mouth once a week. Sunday Last dose " 6/8/25   Past Week    amLODIPine (NORVASC) 10 MG tablet Take 10 mg by mouth every morning.   6/24/2025    aspirin (ASA) 81 MG chewable tablet Take 81 mg by mouth every evening   Past Month    Calcium-Vitamin D-Vitamin K 500-200-40 MG-UNT-MCG CHEW Take 1 chew tab by mouth every morning. 650 Ca - 500 D - 40 K   Past Week    cholecalciferol 50 MCG (2000 UT) tablet Take 2,000 Units by mouth every morning.   Past Week    DULoxetine (CYMBALTA) 30 MG capsule Take 30 mg by mouth every morning.   6/24/2025    econazole nitrate 1 % external cream APPLY CREAM TOPICALLY TO AFFECTED AREA ONCE DAILY   Taking    fluocinonide (LIDEX) 0.05 % external solution Apply topically daily as needed (scalp)   Past Month    fluticasone (FLONASE) 50 MCG/ACT nasal spray Spray 1 spray in nostril daily as needed for rhinitis   Past Week    fluticasone-salmeterol (ADVAIR) 250-50 MCG/DOSE inhaler Inhale 1 puff into the lungs 2 times daily   6/24/2025    furosemide (LASIX) 20 MG tablet Take 20 mg by mouth daily as needed (ankle swelling)   Unknown    icosapent ethyl (VASCEPA) 1 g CAPS capsule Take 2 g by mouth 2 times daily (with meals).   More than a month    inclisiran (LEQVIO) 284 MG/1.5ML SOSY SQ injection Inject 284 mg subcutaneously every 6 months. Last dose March-April 2025   More than a month    ipratropium (ATROVENT) 0.06 % nasal spray Spray 2 sprays into both nostrils 3 times daily as needed for other (nasal drip).   Taking As Needed    ipratropium - albuterol 0.5 mg/2.5 mg/3 mL (DUONEB) 0.5-2.5 (3) MG/3ML neb solution Inhale 3 mLs into the lungs every 4 hours as needed for shortness of breath   Unknown    loratadine (CLARITIN) 10 MG tablet Take 10 mg by mouth every morning.   Past Week    metroNIDAZOLE (FLAGYL) 500 MG tablet Take 1 tablet (500 mg) by mouth every 6 hours. At 8:00 am, 2:00 pm, 8:00 pm the day prior to your surgery with neomycin and zofran. 3 tablet 0 Past Week    mometasone (ELOCON) 0.1 % external solution Apply few drops  to areas of itching on scalp nightly as needed for up to 2 weeks and then sparingly thereafter.   Past Month    multivitamin w/minerals (MULTI-VITAMIN) tablet Take 1 tablet by mouth daily Uses here and there   Past Month    mupirocin (BACTROBAN) 2 % external ointment Apply once daily to skin around nail, tip of nail and under nail x 4 weeks.   Past Month    neomycin (MYCIFRADIN) 500 MG tablet Take 2 tablets (1,000 mg) by mouth every 6 hours. At 8:00 am, 2:00 pm, 8:00 pm the day prior to your surgery with flagyl and zofran. 6 tablet 0 2025    [] nitroFURantoin macrocrystal-monohydrate (MACROBID) 100 MG capsule Take 1 capsule (100 mg) by mouth 2 times daily for 3 days. 6 capsule 0 2025    nitrous oxide/oxygen 50/50 blend 1 application. by inhalation route as directed. 3 lpm continuous   2025    ondansetron (ZOFRAN) 4 MG tablet Take 1 tablet (4 mg) by mouth every 6 hours. At 8:00 am, 2:00 pm, 8:00 pm the day prior to your surgery with neomycin and flagyl. 3 tablet 0 2025    polyethylene glycol (MIRALAX) 17 GM/Dose powder Please take 238 grams mixed with 64 oz of Gatorade at 4pm the night before surgery 238 g 0 2025    primidone (MYSOLINE) 50 MG tablet Take 50 mg by mouth 2 times daily.   2025    propranolol ER (INDERAL LA) 80 MG 24 hr capsule Take 80 mg by mouth 2 times daily.   2025    zinc sulfate (ZINCATE) 220 (50 Zn) MG capsule Take 220 mg by mouth daily   Past Month            Discharge Medications:     Current Discharge Medication List        CONTINUE these medications which have NOT CHANGED    Details   albuterol (PROAIR HFA/PROVENTIL HFA/VENTOLIN HFA) 108 (90 Base) MCG/ACT inhaler Inhale 2 puffs into the lungs every 6 hours as needed for shortness of breath / dyspnea or wheezing      alendronate (FOSAMAX) 70 MG tablet Take 70 mg by mouth once a week.  Last dose 25      amLODIPine (NORVASC) 10 MG tablet Take 10 mg by mouth every morning.      aspirin (ASA) 81  MG chewable tablet Take 81 mg by mouth every evening      Calcium-Vitamin D-Vitamin K 500-200-40 MG-UNT-MCG CHEW Take 1 chew tab by mouth every morning. 650 Ca - 500 D - 40 K      cholecalciferol 50 MCG (2000 UT) tablet Take 2,000 Units by mouth every morning.      DULoxetine (CYMBALTA) 30 MG capsule Take 30 mg by mouth every morning.      econazole nitrate 1 % external cream APPLY CREAM TOPICALLY TO AFFECTED AREA ONCE DAILY      fluocinonide (LIDEX) 0.05 % external solution Apply topically daily as needed (scalp)      fluticasone (FLONASE) 50 MCG/ACT nasal spray Spray 1 spray in nostril daily as needed for rhinitis      fluticasone-salmeterol (ADVAIR) 250-50 MCG/DOSE inhaler Inhale 1 puff into the lungs 2 times daily      furosemide (LASIX) 20 MG tablet Take 20 mg by mouth daily as needed (ankle swelling)      icosapent ethyl (VASCEPA) 1 g CAPS capsule Take 2 g by mouth 2 times daily (with meals).      inclisiran (LEQVIO) 284 MG/1.5ML SOSY SQ injection Inject 284 mg subcutaneously every 6 months. Last dose March-April 2025      ipratropium (ATROVENT) 0.06 % nasal spray Spray 2 sprays into both nostrils 3 times daily as needed for other (nasal drip).      ipratropium - albuterol 0.5 mg/2.5 mg/3 mL (DUONEB) 0.5-2.5 (3) MG/3ML neb solution Inhale 3 mLs into the lungs every 4 hours as needed for shortness of breath      loratadine (CLARITIN) 10 MG tablet Take 10 mg by mouth every morning.      metroNIDAZOLE (FLAGYL) 500 MG tablet Take 1 tablet (500 mg) by mouth every 6 hours. At 8:00 am, 2:00 pm, 8:00 pm the day prior to your surgery with neomycin and zofran.  Qty: 3 tablet, Refills: 0    Associated Diagnoses: Incontinence of feces, unspecified fecal incontinence type; Bowel and bladder incontinence      mometasone (ELOCON) 0.1 % external solution Apply few drops to areas of itching on scalp nightly as needed for up to 2 weeks and then sparingly thereafter.      multivitamin w/minerals (MULTI-VITAMIN) tablet Take 1  tablet by mouth daily Uses here and there      mupirocin (BACTROBAN) 2 % external ointment Apply once daily to skin around nail, tip of nail and under nail x 4 weeks.      neomycin (MYCIFRADIN) 500 MG tablet Take 2 tablets (1,000 mg) by mouth every 6 hours. At 8:00 am, 2:00 pm, 8:00 pm the day prior to your surgery with flagyl and zofran.  Qty: 6 tablet, Refills: 0    Associated Diagnoses: Incontinence of feces, unspecified fecal incontinence type; Bowel and bladder incontinence      nitrous oxide/oxygen 50/50 blend 1 application. by inhalation route as directed. 3 lpm continuous      ondansetron (ZOFRAN) 4 MG tablet Take 1 tablet (4 mg) by mouth every 6 hours. At 8:00 am, 2:00 pm, 8:00 pm the day prior to your surgery with neomycin and flagyl.  Qty: 3 tablet, Refills: 0    Associated Diagnoses: Incontinence of feces, unspecified fecal incontinence type; Bowel and bladder incontinence      polyethylene glycol (MIRALAX) 17 GM/Dose powder Please take 238 grams mixed with 64 oz of Gatorade at 4pm the night before surgery  Qty: 238 g, Refills: 0    Associated Diagnoses: Incontinence of feces, unspecified fecal incontinence type; Bowel and bladder incontinence      primidone (MYSOLINE) 50 MG tablet Take 50 mg by mouth 2 times daily.      propranolol ER (INDERAL LA) 80 MG 24 hr capsule Take 80 mg by mouth 2 times daily.      zinc sulfate (ZINCATE) 220 (50 Zn) MG capsule Take 220 mg by mouth daily           STOP taking these medications       nitroFURantoin macrocrystal-monohydrate (MACROBID) 100 MG capsule Comments:   Reason for Stopping:         omeprazole (PRILOSEC) 40 MG DR capsule Comments:   Reason for Stopping:                      Brief History of Illness:   75 year old female with PMH VICKI, polymyositis, history of PE, restrictive lung disease, scoliosis, prediabetes, who has neurogenic bowel and neurogenic bladder.  Now s/p Laparoscopic end descending colostomy (colorectal team) and cystoscopy with suprapubic tube  "placement (urology team) on 6/25/2025.           Hospital Course:   Post-operatively pt was gently fluid resuscitated.  Suprapubic tube remained in place.  Pt had eventual return of bowel function via new colostomy and was able to tolerate small amounts of a regular diet.  Pt did experience some urinary leakage from urethra, particularly with activity.  Per Urology, Tolterodine ER 2mg daily was initiated.      Pt was seen by WOCN and taught how to manage her new ostomy. Pt was seen by PT/OT and deemed appropriate for discharge home.  Post-operative pain was controlled with scheduled tylenol, ibuprofen, gabapentin, robaxin and prn oxycodone.     Patient is to follow up in the [facility] Clinic in 2-3 week with Ana Lilia Vazquez NP or Magalie Foster PA-C and then with Dr. Lion in 3-4 weeks after. Pt has follow up with Dr. Duarte of Urology on 8/4 to exchange the suprapubic catheter.          Day of Discharge Physical Exam:   Blood pressure 97/60, pulse 85, temperature 98.3  F (36.8  C), temperature source Axillary, resp. rate 16, height 1.6 m (5' 3\"), weight 95.8 kg (211 lb 3.2 oz), SpO2 95%, not currently breastfeeding.    Gen: AAOx3, NAD  Pulm: Non-labored breathing  Abd: Soft, appropriately tender, no guarding/rebound   Incision C/D/I with ***   Stoma pink and viable with \stool and gas in bag   Suprapubic tube in place with yellow urine ***  Ext:  Warm and well-perfused         Final Pathology Result:   {:[id number]}             Discharge Instructions and Follow-Up:     Discharge Procedure Orders   Discharge Instructions   Order Comments: You are going home with a suprapubic tube catheter. This catheter will not generally restrict your activities, but you should be careful if you are driving such that it does not become a distraction.    Your nurse or  will provide written catheter care instructions for you to take home.  Protect the catheter and treat it like an extension of your " body - keep it secured to your leg and do not let it get caught, snagged, or tugged. The catheter tubing should remain tension-free and without kinks. In order to drain best, the tubing and bag should always be lower than your body.  You may notice a little blood, small amount of white discharge, or caking at the catheter insertion site. This is normal but it can irritate the skin so it is best to wash this off in the daily shower. You are encouraged to wash the catheter tubing to keep it clean, and the urine drainage bag also can be gotten wet.   Some people prefer to have a dressing around your catheter to protect clothing from the small amounts of mucous or urine drainage that can be normally seen from that area. If you do use a dressing, change dressing as needed to maintain cleanliness.  If you prefer not to use a dressing, take care to assure that the catheter remains securely fastened to the skin with tape or a securing device.   Your suprapubic tube catheter will be changed on 8/4/25 with Dr Duarte.            Home Health Care:     {:[id number]}           Discharge Disposition:     Discharged to home      Condition at discharge: Stable      Norma Knutson PA-C ..................6/26/2025   1:21 PM  Colon and Rectal Surgery     Pt was seen and discussed with Dr. Lozano on ***    The above plan of care was performed and communicated to me by CRS Staff: Dr. Og Lion     Medical Decision Making   { TIP   MDM Calculator    MDM grid (w/ times)    Coding Support Chat  Billing is now based on time OR medical decision making complexity. Medical decision making included in your A&P does NOT need to be re-documented here.    :14366}    35 MINUTES SPENT BY ME on the date of service doing chart review, history, exam, documentation & further activities per the note.      20446 post op hospital visit

## 2025-06-27 ENCOUNTER — APPOINTMENT (OUTPATIENT)
Dept: PHYSICAL THERAPY | Facility: CLINIC | Age: 75
DRG: 329 | End: 2025-06-27
Attending: UROLOGY
Payer: COMMERCIAL

## 2025-06-27 PROBLEM — Z93.3 STATUS POST COLOSTOMY (H): Status: ACTIVE | Noted: 2025-06-27

## 2025-06-27 PROBLEM — Z93.59 SUPRAPUBIC CATHETER (H): Status: ACTIVE | Noted: 2025-06-27

## 2025-06-27 NOTE — PROGRESS NOTES
"   06/26/25 0850   Appointment Info   Signing Clinician's Name / Credentials (PT) Joycelyn Franklin, PT   Rehab Comments (PT) Abd precautions   Living Environment   People in Home spouse   Current Living Arrangements condominium   Home Accessibility no concerns   Transportation Anticipated family or friend will provide   Living Environment Comments Pt lives with her spouse in an accessible condo; no stairs   Self-Care   Usual Activity Tolerance moderate   Current Activity Tolerance fair   Regular Exercise No   Equipment Currently Used at Home grab bar, tub/shower;shower chair;walker, rolling   Fall history within last six months no   Activity/Exercise/Self-Care Comment Pt states that she was mod independent with her mobility using 4WW; independent with ADLs; spouse assists with home managment   General Information   Onset of Illness/Injury or Date of Surgery 06/25/25   Referring Physician Og Lion MD   Patient/Family Therapy Goals Statement (PT) Return home   Pertinent History of Current Problem (include personal factors and/or comorbidities that impact the POC) Per chart review, \"75 year old female with hx of cauda equina syndrome c/b fecal/urinary incontinence who underwent a lap end colostomy creation with Dr. Lion and suprapubic catheter placement with urology on 6/25.\" PMHx: scoliosis with prior spine surgeries; VICKI; obesity; polymyositis; prediabetes; PE; RLS; restrictive lung disease with use of supplemental O2 at home (3 LPM via nasal cannula)   Existing Precautions/Restrictions abdominal;oxygen therapy device and L/min   General Observations Activity: amb with assist   Cognition   Affect/Mental Status (Cognition) WNL   Orientation Status (Cognition) oriented x 4   Follows Commands (Cognition) WNL   Pain Assessment   Patient Currently in Pain Yes, see Vital Sign flowsheet   Integumentary/Edema   Integumentary/Edema Comments right L/E edema; new ostomy & suprapubic catheter   Posture    Posture " Forward head position;Protracted shoulders;Scoliosis   Range of Motion (ROM)   ROM Comment bilateral L/Es WFL for mobility needs   Strength (Manual Muscle Testing)   Strength Comments bilateral L/Es WFL for mobility needs   Bed Mobility   Comment, (Bed Mobility) min assist for supine to/from right sidelying to/from supine   Transfers   Comment, (Transfers) SBA for sit to/from stand from EOB & armchair with use of 4WW   Gait/Stairs (Locomotion)   Comment, (Gait/Stairs) SBA to amb on level 180 ft with 4WW; exhibits forward flexed posture, decreased gait gilda   Balance   Balance Comments sitting - good at EOB without support; standing - good/fair, able to stand without U/E support but prefers 4WW for dynamic standing activities   Sensory Examination   Sensory Perception Comments pt reports chronic sensory changes in bilateral L/Es   Coordination   Coordination no deficits were identified   Muscle Tone   Muscle Tone no deficits were identified   Clinical Impression   Criteria for Skilled Therapeutic Intervention Yes, treatment indicated   PT Diagnosis (PT) impaired functional mobility   Influenced by the following impairments pain, post-op precautions, deconditioning   Functional limitations due to impairments decreased independence with bed mobility, transfers & gait   Clinical Presentation (PT Evaluation Complexity) stable   Clinical Presentation Rationale based upon subjective information provided, objective exam findings, medical history & clinical judgement   Planned Therapy Interventions (PT) bed mobility training;gait training;patient/family education;transfer training   Risk & Benefits of therapy have been explained evaluation/treatment results reviewed;care plan/treatment goals reviewed;participants voiced agreement with care plan;participants included;patient   PT Total Evaluation Time   PT Eval, Low Complexity Minutes (28326) 10   Physical Therapy Goals   PT Frequency 6x/week   PT Predicted Duration/Target  Date for Goal Attainment 07/03/25   PT Goals Bed Mobility;Transfers;Gait   PT: Bed Mobility Independent;Supine to/from sit;Rolling;Bridging;Within precautions   PT: Transfers Modified independent;Sit to/from stand;Bed to/from chair;Assistive device;Within precautions   PT: Gait Modified independent;Rolling walker;Greater than 200 feet   PT Discharge Planning   PT Plan PT: flat bed mobility without rail; transfer training with 4WW; gait training with 4WW   PT Discharge Recommendation (DC Rec) home with assist   PT Rationale for DC Rec Pt mobilizing well with PT assist; pain is limiting factor for activity tolerance at this time; anticipate that pt will progress to mod independent mobility for return home with assist from spouse   PT Brief overview of current status min assist supine to/from sidelying to/from sitting; SBA for transfers with 4WW; SBA to amb with 4WW   PT Total Distance Amb During Session (feet) 180   Physical Therapy Time and Intention   Total Session Time (sum of timed and untimed services) 10

## 2025-06-27 NOTE — PLAN OF CARE
Goal Outcome Evaluation:    Plan of Care Reviewed With: patient    Overall Patient Progress: improving  Overall Patient Progress: improving    Outcome Evaluation: Assumed care from [id number].  A&Ox4, vitally stable on CPAP (with 5L O2 connected) overnight. Continue with plan of care.     Clau Wright RN

## 2025-06-28 LAB
CREAT SERPL-MCNC: 0.55 MG/DL (ref 0.51–0.95)
EGFRCR SERPLBLD CKD-EPI 2021: >90 ML/MIN/1.73M2
MCV RBC AUTO: 92 FL (ref 78–100)
PLATELET # BLD AUTO: 189 10E3/UL (ref 150–450)

## 2025-06-30 ENCOUNTER — PATIENT OUTREACH (OUTPATIENT)
Dept: SURGERY | Facility: CLINIC | Age: 75
End: 2025-06-30
Payer: COMMERCIAL

## 2025-06-30 NOTE — PROGRESS NOTES
"Post Op Note     Called to check on patient postoperatively after hospital discharge.       Patient is s/p lap end descending colostomy (CRS) and suprapubic tube placement (Uro) with Dr. Og Lion.   Admitted 6/25 and discharged on 6/28.    Pain is well controlled with tylenol, gabapentin, robaxin, and oxycodone   Patient is eating and drinking normally. Patient is on a regular diet.  Encouraged patient to drink 8-10 glasses of water a day.   Patient's colostomy has had \"small amounts of slimy red output\". No stool like output since discharge. She states this is the same as it was during her admission. She is passing lots of gas through her bag. Did inform Dr. Lion who said she could try miralax if she wanted, otherwise nothing further at this time.   Denies taking medication for bowel function.   Patient denies pouching difficulties, she does state she noted some skin irritation on her peristomal skin that she saw when she changed her back today. She used skin barrier before she reapplied. Does see Keila JO RN on 7/15 who can eval further if continued skin irriation.   Patient does require supplies. Does not need assistance ordering supplies.  Patient is voiding via suprapubic catheter. Is taking Detrol until SP tube exchange on 8/4.  Patient is set up with home care. Patient reports being contacted by the home care service. Patient will be seen by home care tomorrow.   Patient Denies nausea and vomiting.  Patient denies any fevers or chills.  Patient's incision is cdi. Patient reminded NOT to remove any dressings over their incisions.   Patient is on a activity restriction. Lifting 10 pounds for 6 weeks.   Patient Denies needing any forms completed.   Follow up is set up with Magalie Foster PA-C on 7/15 and with Dr. Og Lion on 8/7.   Encouraged the patient to contact the clinic in the meantime with any fevers, chills, nausea, vomiting, increased colostomy output, no colostomy output, dizziness, " lightheadedness, uncontrolled pain, changes to the incisions, or with any questions or concerns.  Special Concerns:   - ok to resume all home meds including aspirin   - f/u with Dr. Duarte in August for supra-pubic tube exchange on 8/4 - given Detrol until appt with Gerald.     Patient's questions were answered to their stated satisfaction and they are in agreement with this plan.    LEONARDA Santos [phone]  Colon & Rectal Surgery Clinic  [facility]

## 2025-07-15 ENCOUNTER — OFFICE VISIT (OUTPATIENT)
Dept: WOUND CARE | Facility: CLINIC | Age: 75
End: 2025-07-15
Payer: COMMERCIAL

## 2025-07-15 ENCOUNTER — OFFICE VISIT (OUTPATIENT)
Dept: SURGERY | Facility: CLINIC | Age: 75
End: 2025-07-15
Payer: COMMERCIAL

## 2025-07-15 DIAGNOSIS — R15.9 BOWEL AND BLADDER INCONTINENCE: ICD-10-CM

## 2025-07-15 DIAGNOSIS — R32 BOWEL AND BLADDER INCONTINENCE: ICD-10-CM

## 2025-07-15 DIAGNOSIS — R15.9 INCONTINENCE OF FECES, UNSPECIFIED FECAL INCONTINENCE TYPE: Primary | ICD-10-CM

## 2025-07-15 DIAGNOSIS — R15.9 FULL INCONTINENCE OF FECES: ICD-10-CM

## 2025-07-15 DIAGNOSIS — Z09 FOLLOW-UP EXAMINATION AFTER COLORECTAL SURGERY: Primary | ICD-10-CM

## 2025-07-15 DIAGNOSIS — Z93.3 COLOSTOMY IN PLACE (H): ICD-10-CM

## 2025-07-15 NOTE — PROGRESS NOTES
"Post op Ostomy Consult    Referral from Dr. Lion/Rafy  Consult attended by patient and daughter  Diagnosis:    Incontinence of feces, unspecified fecal incontinence type  Bowel and bladder incontinence  Full incontinence of feces Date of Surgery: 06/25/2025  Type of Surgery: LAPAROSCOPIC, POSSIBLE OPEN, CREATION COLOSTOMY, cystoscopy and suprapubic tube placement     Subjective:She is here with  with her  daughter  and ostomy care is provided by self and home care  Patient's reason for visit: \"post op\"     The quantity of ostomy supplies needed by a beneficiary is determined primarily by the type of ostomy, its location, its construction, and the condition of the skin surface surrounding the stoma.    Objective:  Type: Colostomy since 06/25/2025  Stoma: 25 mm  end Viable, Red, Retracted, and Flat os below level of the skin  Location: right lower quadrant     Complications: none   Mucocutaneous junction:   at 4-7 o'clock  Output: soft    Peristomal skin: intact, fungal rash, and itchy  and barrier is showing some signs of leakage  3 o'clock  Frequency of pouch change: three times weekly. This is scheduled.   Received home care WOC assessment after discharge:Yes  Ordering supplies from:  home care is ordering supplies    Current pouch system/supplies: Coloplast  two piece, cut to fit, flat, and barrier ring and barrier extenders    Assessment: Patient doing well after surgery.  She had 1 blowout last week but she believes that was from eating a root beer float.  Possibly caused by carbonation and dairy intolerance. She has tried Am[employer] as well and prefers that.  Ames has less order and the 2 piece clicks on easier.  She shows a photo of her rash she bumpy spotty skin last week behind the barrier which was very itchy.  Likely a fungal rash.  It looks better today though    Intervention/Plan: Discussed the need to take Lactaid tablets with dairy since he it produces a lot of gas likely due to " lactose intolerant.  Her children are lactose intolerant as well.  Her stoma is emptying below her level of her skin and she will likely need convex pouches in the future however since the surgery was recent and the separation of the mucocutaneous junction has not healed quite yet we will hold off for about 3 weeks.  She will return to clinic in 3 weeks. She prefers Tyler products and because of a worry about rashes we'll aim for barriers without tape.  To prevent fungal rash Nystop given to patient  Pouching system assessment , Refitting of appliance , Adjustment of pouching plan, Ostomy accessory product use , Peristomal skin care,  Odor/flatus management , Infection prevention/hygiene , Hernia prevention, Lifestyle adjustments , and Discharge instructions   Patient  has consented to receive samples from the industry    Return to clinic 4 week(s)  . Treated and follow-up appointment made     Marbella COCHRAN  saw pt today and  was available for supervision of care if needed or if questions should arise and regarding plan of care. Keila Joiner RN  RN CWON

## 2025-07-15 NOTE — LETTER
"7/15/2025       RE: Jenna Jhaveri  [address] [address]     Dear Colleague,    Thank you for referring your patient, Jenna Jhaveri, to the [facility] [place] [facility]. Please see a copy of my visit note below.    Colon and Rectal Surgery Postoperative Clinic Note    Patient: Jenna Jhaveri  YOB: 1950  Date of Visit: 7/15/2025    Jenna Jhaveri is a very pleasant 75 year old female with severe neurogenic bowel and bladder following spinal surgery (05/2022) now status post laparoscopic end descending colostomy (Dr. Lion) and suprapubic cystostomy tube placement (Dr. Duarte) on 6/25/2025.    Interval history: Seen with ARTIS Friedman. Doing well. No abdominal pain- actually does not have sensation here at baseline. Still has \"phantom\" anal pain and rectal pressure occasionally. There is some itchiness around the stoma and just found out that there is a peristomal fungal rash. Does not have stool output daily but there is flatus every day. Even before the stoma, she did not have a BM daily so this is not abnormal for her. Very happy that she can wear normal underwear now.      Physical Examination:   There were no vitals taken for this visit.  General: alert, oriented, in no acute distress, sitting comfortably  Respiratory: non-labored breathing on home O2 machine  Abdomen: Obese, soft, non-tender. 2 laparoscopic incisions well approximated    Assessment/Plan:  75 year old female with severe neurogenic bowel and bladder following spinal surgery (05/2022) now status post laparoscopic end descending colostomy (Dr. Lion) and suprapubic cystostomy tube placement (Dr. Duarte) on 6/25/2025. Doing well after surgery and recovering appropriately. Okay for regular diet as tolerated. Reviewed lifting restrictions. Already scheduled to see Dr. Og Lion on 8/7/25. " Contact us with any questions or concerns in the meantime. Patient's questions were answered to their stated satisfaction and they are in agreement with this plan.      Past Medical History:   Diagnosis Date     Dyslipidemia      Flatback syndrome      HTN (hypertension)      Obesity      VICKI (obstructive sleep apnea)      Polymyositis (H)      Prediabetes      Pulmonary embolism (H)      Restless legs syndrome (RLS)      Restrictive lung disease      Scoliosis      Past Surgical History:   Procedure Laterality Date     CYSTOSCOPY FLEXIBLE, CYSTOSTOMY, INSERT TUBE SUPRAPUBIC, COMBINED N/A 6/25/2025    Procedure: cystoscopy and suprapubic tube placement;  Surgeon: Bigg Duarte MD;  Location: [facility] OR     CYSTOSCOPY, INJECT COLLAGEN, COMBINED N/A 10/9/2023    Procedure: CYSTOSCOPY, WITH PERIURETHRAL BULKING AGENT INJECTION;  Surgeon: Ivonne Shaihk MD;  Location: [facility] OR     LAPAROSCOPIC COLOSTOMY N/A 6/25/2025    Procedure: LAPAROSCOPIC CREATION COLOSTOMY;  Surgeon: Og Lion MD;  Location: [facility] OR     ORTHOPEDIC SURGERY      multiple back procedures, knee surgery, carpal tunnel     Current Outpatient Medications   Medication Sig Dispense Refill     albuterol (PROAIR HFA/PROVENTIL HFA/VENTOLIN HFA) 108 (90 Base) MCG/ACT inhaler Inhale 2 puffs into the lungs every 6 hours as needed for shortness of breath / dyspnea or wheezing       alendronate (FOSAMAX) 70 MG tablet Take 70 mg by mouth once a week. Sunday Last dose 6/8/25       amLODIPine (NORVASC) 10 MG tablet Take 10 mg by mouth every morning.       aspirin (ASA) 81 MG chewable tablet Take 81 mg by mouth every evening       Calcium-Vitamin D-Vitamin K 500-200-40 MG-UNT-MCG CHEW Take 1 chew tab by mouth every morning. 650 Ca - 500 D - 40 K       cholecalciferol 50 MCG (2000 UT) tablet Take 2,000 Units by mouth every morning.       DULoxetine (CYMBALTA) 30 MG capsule Take 30 mg by mouth every morning.       econazole nitrate 1 % external cream  APPLY CREAM TOPICALLY TO AFFECTED AREA ONCE DAILY       fluocinonide (LIDEX) 0.05 % external solution Apply topically daily as needed (scalp)       fluticasone (FLONASE) 50 MCG/ACT nasal spray Spray 1 spray in nostril daily as needed for rhinitis       fluticasone-salmeterol (ADVAIR) 250-50 MCG/DOSE inhaler Inhale 1 puff into the lungs 2 times daily       furosemide (LASIX) 20 MG tablet Take 20 mg by mouth daily as needed (ankle swelling)       gabapentin (NEURONTIN) 100 MG capsule Take 1 capsule (100 mg) by mouth at bedtime. 10 capsule 0     ibuprofen (ADVIL/MOTRIN) 600 MG tablet Take 1 tablet (600 mg) by mouth 3 times daily. 20 tablet 0     icosapent ethyl (VASCEPA) 1 g CAPS capsule Take 2 g by mouth 2 times daily (with meals).       inclisiran (LEQVIO) 284 MG/1.5ML SOSY SQ injection Inject 284 mg subcutaneously every 6 months. Last dose March-April 2025       ipratropium (ATROVENT) 0.06 % nasal spray Spray 2 sprays into both nostrils 3 times daily as needed for other (nasal drip).       ipratropium - albuterol 0.5 mg/2.5 mg/3 mL (DUONEB) 0.5-2.5 (3) MG/3ML neb solution Inhale 3 mLs into the lungs every 4 hours as needed for shortness of breath       loratadine (CLARITIN) 10 MG tablet Take 10 mg by mouth every morning.       methocarbamol (ROBAXIN) 500 MG tablet Take 1 tablet (500 mg) by mouth 4 times daily. 20 tablet 0     mometasone (ELOCON) 0.1 % external solution Apply few drops to areas of itching on scalp nightly as needed for up to 2 weeks and then sparingly thereafter.       multivitamin w/minerals (MULTI-VITAMIN) tablet Take 1 tablet by mouth daily Uses here and there       mupirocin (BACTROBAN) 2 % external ointment Apply once daily to skin around nail, tip of nail and under nail x 4 weeks.       oxyCODONE (ROXICODONE) 5 MG tablet Take 1 tablet (5 mg) by mouth every 8 hours as needed for severe pain. 5 tablet 0     primidone (MYSOLINE) 50 MG tablet Take 50 mg by mouth 2 times daily.       propranolol ER  (INDERAL LA) 80 MG 24 hr capsule Take 80 mg by mouth 2 times daily.       tolterodine ER (DETROL LA) 2 MG 24 hr capsule Take 1 capsule (2 mg) by mouth daily. 70 capsule 0     zinc sulfate (ZINCATE) 220 (50 Zn) MG capsule Take 220 mg by mouth daily       Allergies   Allergen Reactions     Latex Hives     Potassium Shortness Of Breath, Palpitations, Angioedema and Anaphylaxis     Penicillin G Swelling     Statins Muscle Pain (Myalgia)     Statin induced myopathy       Family History   Problem Relation Age of Onset     Anesthesia Reaction No family hx of      Deep Vein Thrombosis (DVT) No family hx of      Social History     Tobacco Use     Smoking status: Former     Current packs/day: 0.00     Types: Cigarettes     Quit date:      Years since quittin.5     Passive exposure: Past     Smokeless tobacco: Never   Substance Use Topics     Alcohol use: Never     Marital status: .      Magalie Foster PA-C  Colon and Rectal Surgery  [facility]      I spent a total of 15 minutes on the day of the visit.  Time spent on date of encounter doing chart review, history and exam, documentation, and further activities in this note. This is a postoperative visit.    Again, thank you for allowing me to participate in the care of your patient.      Sincerely,    Magalie Foster PA-C

## 2025-07-15 NOTE — PROGRESS NOTES
"Colon and Rectal Surgery Postoperative Clinic Note    Patient: Jenna Jhaveri  YOB: 1950  Date of Visit: 7/15/2025    Jenna Jhaveri is a very pleasant 75 year old female with severe neurogenic bowel and bladder following spinal surgery (05/2022) now status post laparoscopic end descending colostomy (Dr. Lion) and suprapubic cystostomy tube placement (Dr. Duarte) on 6/25/2025.    Interval history: Seen with ARTIS Friedman. Doing well. No abdominal pain- actually does not have sensation here at baseline. Still has \"phantom\" anal pain and rectal pressure occasionally. There is some itchiness around the stoma and just found out that there is a peristomal fungal rash. Does not have stool output daily but there is flatus every day. Even before the stoma, she did not have a BM daily so this is not abnormal for her. Very happy that she can wear normal underwear now.      Physical Examination:   There were no vitals taken for this visit.  General: alert, oriented, in no acute distress, sitting comfortably  Respiratory: non-labored breathing on home O2 machine  Abdomen: Obese, soft, non-tender. 2 laparoscopic incisions well approximated    Assessment/Plan:  75 year old female with severe neurogenic bowel and bladder following spinal surgery (05/2022) now status post laparoscopic end descending colostomy (Dr. Lion) and suprapubic cystostomy tube placement (Dr. Duarte) on 6/25/2025. Doing well after surgery and recovering appropriately. Okay for regular diet as tolerated. Reviewed lifting restrictions. Already scheduled to see Dr. Og Lion on 8/7/25. Contact us with any questions or concerns in the meantime. Patient's questions were answered to their stated satisfaction and they are in agreement with this plan.      Past Medical History:   Diagnosis Date    Dyslipidemia     Flatback syndrome     HTN (hypertension)     Obesity     VICKI (obstructive sleep apnea)     Polymyositis (H) "     Prediabetes     Pulmonary embolism (H)     Restless legs syndrome (RLS)     Restrictive lung disease     Scoliosis      Past Surgical History:   Procedure Laterality Date    CYSTOSCOPY FLEXIBLE, CYSTOSTOMY, INSERT TUBE SUPRAPUBIC, COMBINED N/A 6/25/2025    Procedure: cystoscopy and suprapubic tube placement;  Surgeon: Bigg Duarte MD;  Location: [facility] OR    CYSTOSCOPY, INJECT COLLAGEN, COMBINED N/A 10/9/2023    Procedure: CYSTOSCOPY, WITH PERIURETHRAL BULKING AGENT INJECTION;  Surgeon: Ivonne Shaikh MD;  Location: [facility] OR    LAPAROSCOPIC COLOSTOMY N/A 6/25/2025    Procedure: LAPAROSCOPIC CREATION COLOSTOMY;  Surgeon: Og Lion MD;  Location: [facility] OR    ORTHOPEDIC SURGERY      multiple back procedures, knee surgery, carpal tunnel     Current Outpatient Medications   Medication Sig Dispense Refill    albuterol (PROAIR HFA/PROVENTIL HFA/VENTOLIN HFA) 108 (90 Base) MCG/ACT inhaler Inhale 2 puffs into the lungs every 6 hours as needed for shortness of breath / dyspnea or wheezing      alendronate (FOSAMAX) 70 MG tablet Take 70 mg by mouth once a week. Sunday Last dose 6/8/25      amLODIPine (NORVASC) 10 MG tablet Take 10 mg by mouth every morning.      aspirin (ASA) 81 MG chewable tablet Take 81 mg by mouth every evening      Calcium-Vitamin D-Vitamin K 500-200-40 MG-UNT-MCG CHEW Take 1 chew tab by mouth every morning. 650 Ca - 500 D - 40 K      cholecalciferol 50 MCG (2000 UT) tablet Take 2,000 Units by mouth every morning.      DULoxetine (CYMBALTA) 30 MG capsule Take 30 mg by mouth every morning.      econazole nitrate 1 % external cream APPLY CREAM TOPICALLY TO AFFECTED AREA ONCE DAILY      fluocinonide (LIDEX) 0.05 % external solution Apply topically daily as needed (scalp)      fluticasone (FLONASE) 50 MCG/ACT nasal spray Spray 1 spray in nostril daily as needed for rhinitis      fluticasone-salmeterol (ADVAIR) 250-50 MCG/DOSE inhaler Inhale 1 puff into the lungs 2 times daily       furosemide (LASIX) 20 MG tablet Take 20 mg by mouth daily as needed (ankle swelling)      gabapentin (NEURONTIN) 100 MG capsule Take 1 capsule (100 mg) by mouth at bedtime. 10 capsule 0    ibuprofen (ADVIL/MOTRIN) 600 MG tablet Take 1 tablet (600 mg) by mouth 3 times daily. 20 tablet 0    icosapent ethyl (VASCEPA) 1 g CAPS capsule Take 2 g by mouth 2 times daily (with meals).      inclisiran (LEQVIO) 284 MG/1.5ML SOSY SQ injection Inject 284 mg subcutaneously every 6 months. Last dose March-April 2025      ipratropium (ATROVENT) 0.06 % nasal spray Spray 2 sprays into both nostrils 3 times daily as needed for other (nasal drip).      ipratropium - albuterol 0.5 mg/2.5 mg/3 mL (DUONEB) 0.5-2.5 (3) MG/3ML neb solution Inhale 3 mLs into the lungs every 4 hours as needed for shortness of breath      loratadine (CLARITIN) 10 MG tablet Take 10 mg by mouth every morning.      methocarbamol (ROBAXIN) 500 MG tablet Take 1 tablet (500 mg) by mouth 4 times daily. 20 tablet 0    mometasone (ELOCON) 0.1 % external solution Apply few drops to areas of itching on scalp nightly as needed for up to 2 weeks and then sparingly thereafter.      multivitamin w/minerals (MULTI-VITAMIN) tablet Take 1 tablet by mouth daily Uses here and there      mupirocin (BACTROBAN) 2 % external ointment Apply once daily to skin around nail, tip of nail and under nail x 4 weeks.      oxyCODONE (ROXICODONE) 5 MG tablet Take 1 tablet (5 mg) by mouth every 8 hours as needed for severe pain. 5 tablet 0    primidone (MYSOLINE) 50 MG tablet Take 50 mg by mouth 2 times daily.      propranolol ER (INDERAL LA) 80 MG 24 hr capsule Take 80 mg by mouth 2 times daily.      tolterodine ER (DETROL LA) 2 MG 24 hr capsule Take 1 capsule (2 mg) by mouth daily. 70 capsule 0    zinc sulfate (ZINCATE) 220 (50 Zn) MG capsule Take 220 mg by mouth daily       Allergies   Allergen Reactions    Latex Hives    Potassium Shortness Of Breath, Palpitations, Angioedema and Anaphylaxis     Penicillin G Swelling    Statins Muscle Pain (Myalgia)     Statin induced myopathy       Family History   Problem Relation Age of Onset    Anesthesia Reaction No family hx of     Deep Vein Thrombosis (DVT) No family hx of      Social History     Tobacco Use    Smoking status: Former     Current packs/day: 0.00     Types: Cigarettes     Quit date:      Years since quittin.5     Passive exposure: Past    Smokeless tobacco: Never   Substance Use Topics    Alcohol use: Never     Marital status: .      Magalie Foster PA-C  Colon and Rectal Surgery  [facility]      I spent a total of 15 minutes on the day of the visit.  Time spent on date of encounter doing chart review, history and exam, documentation, and further activities in this note. This is a postoperative visit.

## 2025-08-05 ENCOUNTER — PRE VISIT (OUTPATIENT)
Dept: UROLOGY | Facility: CLINIC | Age: 75
End: 2025-08-05
Payer: COMMERCIAL

## 2025-08-07 ENCOUNTER — TELEPHONE (OUTPATIENT)
Dept: SURGERY | Facility: CLINIC | Age: 75
End: 2025-08-07

## 2025-08-07 ENCOUNTER — OFFICE VISIT (OUTPATIENT)
Dept: SURGERY | Facility: CLINIC | Age: 75
End: 2025-08-07
Payer: COMMERCIAL

## 2025-08-07 VITALS
SYSTOLIC BLOOD PRESSURE: 107 MMHG | DIASTOLIC BLOOD PRESSURE: 72 MMHG | HEART RATE: 90 BPM | OXYGEN SATURATION: 90 % | HEIGHT: 63 IN | WEIGHT: 212.7 LBS | BODY MASS INDEX: 37.69 KG/M2

## 2025-08-07 DIAGNOSIS — Z93.3 COLOSTOMY IN PLACE (H): Primary | ICD-10-CM

## 2025-08-07 DIAGNOSIS — Z09 FOLLOW-UP EXAMINATION AFTER COLORECTAL SURGERY: ICD-10-CM

## 2025-08-07 ASSESSMENT — PAIN SCALES - GENERAL: PAINLEVEL_OUTOF10: NO PAIN (0)

## 2025-08-12 DIAGNOSIS — R33.9 URINARY RETENTION: Primary | ICD-10-CM

## 2025-08-12 DIAGNOSIS — N31.9 NEUROGENIC BLADDER: ICD-10-CM

## (undated) DEVICE — PACK GOWN 3/PK DISP XL SBA32GPFCB

## (undated) DEVICE — PREP CHLORAPREP 26ML TINTED HI-LITE ORANGE 930815

## (undated) DEVICE — LINEN GOWN XLG 5407

## (undated) DEVICE — STPL POWERED ECHELON 60MM PSEE60A

## (undated) DEVICE — ANTIFOG SOLUTION SEE SHARP 150M TROCAR SWABS 30978 (COI)

## (undated) DEVICE — PAD CHUX UNDERPAD 23X36" 676105

## (undated) DEVICE — PITCHER STERILE 1000ML  SSK9004A

## (undated) DEVICE — Device

## (undated) DEVICE — NDL WOLF ASPIRATING INJECT 22GA 31.25CM 8652.7775

## (undated) DEVICE — SUCTION MANIFOLD NEPTUNE 2 SYS 4 PORT 0702-020-000

## (undated) DEVICE — CATH FOLYSIL 16FR 15ML AA6116

## (undated) DEVICE — GLOVE BIOGEL PI MICRO INDICATOR UNDERGLOVE SZ 7.5 48975

## (undated) DEVICE — SYR 10ML FINGER CONTROL W/O NDL 309695

## (undated) DEVICE — SOL WATER IRRIG 1000ML BOTTLE 2F7114

## (undated) DEVICE — ESU PENCIL SMOKE EVAC W/ROCKER SWITCH 0703-047-000

## (undated) DEVICE — TRAY PREP DRY SKIN SCRUB 067

## (undated) DEVICE — DRAPE IOBAN INCISE 13X13" 6640EZ

## (undated) DEVICE — CATH INTRODUCER SUPRAPUBIC 16FR SF-S16-851

## (undated) DEVICE — GLOVE BIOGEL PI MICRO SZ 6.5 48565

## (undated) DEVICE — ESU LIGASURE MARYLAND VESSEL LAP 44CM XLONG LF1944

## (undated) DEVICE — STPL RELOAD REG TISSUE ECHELON 60 X 3.6MM BLUE GST60B

## (undated) DEVICE — LEGGINGS CLEAR TELESCOPIC XLONG 55X30.75IN 6IN 8430

## (undated) DEVICE — NDL BLADDER INJ BONEE 70CM NBI070

## (undated) DEVICE — LINEN TOWEL PACK X30 5481

## (undated) DEVICE — BAG URINARY DRAIN 4000ML LF 153509

## (undated) DEVICE — SOL WATER IRRIG 3000ML BAG 2B7117

## (undated) DEVICE — ENDO TROCAR SLEEVE KII Z-THREADED 05X100MM CTS02

## (undated) DEVICE — ENDO TROCAR FIRST ENTRY KII FIOS Z-THRD 12X100MM CTF73

## (undated) DEVICE — ESU GROUND PAD ADULT W/CORD E7507

## (undated) DEVICE — LINEN TOWEL PACK X6 WHITE 5487

## (undated) DEVICE — GLOVE GAMMEX NEOPRENE ULTRA SZ 7 LF 8514

## (undated) DEVICE — LINEN TOWEL PACK X5 5464

## (undated) DEVICE — TUBING SMOKE EVAC PNEUMOCLEAR HIGH FLOW 0620050250

## (undated) DEVICE — PAD CHUX UNDERPAD 23X24" 7136

## (undated) DEVICE — SUCTION IRR STRYKERFLOW II W/TIP 250-070-520

## (undated) DEVICE — TUBING IRRIG CYSTO/BLADDER SET 81" LF 2C4040

## (undated) DEVICE — KIT POSITIONER TRENDELENBURG NUBLUE TP3000-NB

## (undated) DEVICE — SU VICRYL 4-0 PS-2 18" UND J496H

## (undated) DEVICE — WIPES FOLEY CARE SURESTEP PROVON DFC100

## (undated) DEVICE — PREP POVIDONE-IODINE 10% SOLUTION 4OZ BOTTLE MDS093944

## (undated) DEVICE — VIAL DECANTER STERILE WHITE DYNJDEC06

## (undated) DEVICE — SU VICRYL+ 0 54 UNDYED VCP608H

## (undated) DEVICE — DRAPE U SPLIT 74X120" 29440

## (undated) DEVICE — DRAPE IOBAN INCISE 23X17" 6650EZ

## (undated) DEVICE — SU VICRYL 3-0 SH 8X18" UND J864D

## (undated) DEVICE — PACK CYSTO UMMC CUSTOM

## (undated) DEVICE — DEVICE SUTURE PASSER 14GA WECK EFX EFXSP2

## (undated) DEVICE — SU DERMABOND ADVANCED .7ML DNX12

## (undated) DEVICE — PACK THORACOTOMY UMMC LF SCV32THF13

## (undated) DEVICE — NDL INSUFFLATION 13GA 120MM C2201

## (undated) DEVICE — SU MONOCRYL 4-0 PS-2 27" UND Y426H

## (undated) DEVICE — KIT NEW IMAGE COLOSTOMY/ILEOSTOMY 2 3/4" LF 19354

## (undated) DEVICE — TUBING SMOKE EVACUATOR 7/8"X10' W/WAND 0700-026-000

## (undated) DEVICE — DRAPE SHEET REV FOLD 3/4 9349

## (undated) RX ORDER — FENTANYL CITRATE-0.9 % NACL/PF 10 MCG/ML
PLASTIC BAG, INJECTION (ML) INTRAVENOUS
Status: DISPENSED
Start: 2023-10-09

## (undated) RX ORDER — METRONIDAZOLE 500 MG/100ML
INJECTION, SOLUTION INTRAVENOUS
Status: DISPENSED
Start: 2025-06-25

## (undated) RX ORDER — LIDOCAINE HYDROCHLORIDE AND EPINEPHRINE 10; 10 MG/ML; UG/ML
INJECTION, SOLUTION INFILTRATION; PERINEURAL
Status: DISPENSED
Start: 2025-06-25

## (undated) RX ORDER — DEXAMETHASONE SODIUM PHOSPHATE 4 MG/ML
INJECTION, SOLUTION INTRA-ARTICULAR; INTRALESIONAL; INTRAMUSCULAR; INTRAVENOUS; SOFT TISSUE
Status: DISPENSED
Start: 2023-10-09

## (undated) RX ORDER — FENTANYL CITRATE 50 UG/ML
INJECTION, SOLUTION INTRAMUSCULAR; INTRAVENOUS
Status: DISPENSED
Start: 2025-06-25

## (undated) RX ORDER — GABAPENTIN 300 MG/1
CAPSULE ORAL
Status: DISPENSED
Start: 2025-06-25

## (undated) RX ORDER — FENTANYL CITRATE-0.9 % NACL/PF 10 MCG/ML
PLASTIC BAG, INJECTION (ML) INTRAVENOUS
Status: DISPENSED
Start: 2025-06-25

## (undated) RX ORDER — CEFAZOLIN SODIUM/WATER 2 G/20 ML
SYRINGE (ML) INTRAVENOUS
Status: DISPENSED
Start: 2025-06-25

## (undated) RX ORDER — ACETAMINOPHEN 325 MG/1
TABLET ORAL
Status: DISPENSED
Start: 2025-06-25

## (undated) RX ORDER — ONDANSETRON 2 MG/ML
INJECTION INTRAMUSCULAR; INTRAVENOUS
Status: DISPENSED
Start: 2025-06-25

## (undated) RX ORDER — PROPOFOL 10 MG/ML
INJECTION, EMULSION INTRAVENOUS
Status: DISPENSED
Start: 2025-06-25

## (undated) RX ORDER — GLYCOPYRROLATE 0.2 MG/ML
INJECTION, SOLUTION INTRAMUSCULAR; INTRAVENOUS
Status: DISPENSED
Start: 2023-10-09

## (undated) RX ORDER — FENTANYL CITRATE 50 UG/ML
INJECTION, SOLUTION INTRAMUSCULAR; INTRAVENOUS
Status: DISPENSED
Start: 2023-10-09

## (undated) RX ORDER — PROPOFOL 10 MG/ML
INJECTION, EMULSION INTRAVENOUS
Status: DISPENSED
Start: 2023-10-09

## (undated) RX ORDER — SODIUM CHLORIDE 9 MG/ML
INJECTION, SOLUTION INTRAVENOUS
Status: DISPENSED
Start: 2025-06-25

## (undated) RX ORDER — BUPIVACAINE HYDROCHLORIDE 2.5 MG/ML
INJECTION, SOLUTION EPIDURAL; INFILTRATION; INTRACAUDAL; PERINEURAL
Status: DISPENSED
Start: 2025-06-25

## (undated) RX ORDER — NYSTATIN 100000 [USP'U]/G
POWDER TOPICAL
Status: DISPENSED
Start: 2025-07-15

## (undated) RX ORDER — ENOXAPARIN SODIUM 100 MG/ML
INJECTION SUBCUTANEOUS
Status: DISPENSED
Start: 2025-06-25

## (undated) RX ORDER — ONDANSETRON 2 MG/ML
INJECTION INTRAMUSCULAR; INTRAVENOUS
Status: DISPENSED
Start: 2023-10-09